# Patient Record
Sex: MALE | Race: WHITE | Employment: OTHER | RURAL
[De-identification: names, ages, dates, MRNs, and addresses within clinical notes are randomized per-mention and may not be internally consistent; named-entity substitution may affect disease eponyms.]

---

## 2018-07-17 ENCOUNTER — OFFICE VISIT (OUTPATIENT)
Dept: FAMILY MEDICINE CLINIC | Age: 73
End: 2018-07-17

## 2018-07-17 VITALS
OXYGEN SATURATION: 98 % | RESPIRATION RATE: 16 BRPM | BODY MASS INDEX: 30.48 KG/M2 | DIASTOLIC BLOOD PRESSURE: 78 MMHG | SYSTOLIC BLOOD PRESSURE: 168 MMHG | HEART RATE: 51 BPM | HEIGHT: 72 IN | TEMPERATURE: 96.4 F | WEIGHT: 225 LBS

## 2018-07-17 DIAGNOSIS — Z13.31 SCREENING FOR DEPRESSION: ICD-10-CM

## 2018-07-17 DIAGNOSIS — M54.50 ACUTE LOW BACK PAIN WITHOUT SCIATICA, UNSPECIFIED BACK PAIN LATERALITY: ICD-10-CM

## 2018-07-17 DIAGNOSIS — Z12.11 COLON CANCER SCREENING: ICD-10-CM

## 2018-07-17 DIAGNOSIS — E11.9 TYPE 2 DIABETES MELLITUS WITHOUT COMPLICATION, WITHOUT LONG-TERM CURRENT USE OF INSULIN (HCC): Primary | ICD-10-CM

## 2018-07-17 DIAGNOSIS — I10 ESSENTIAL HYPERTENSION: ICD-10-CM

## 2018-07-17 DIAGNOSIS — E66.9 OBESITY (BMI 30.0-34.9): ICD-10-CM

## 2018-07-17 DIAGNOSIS — I25.10 CORONARY ARTERY DISEASE INVOLVING NATIVE HEART WITHOUT ANGINA PECTORIS, UNSPECIFIED VESSEL OR LESION TYPE: ICD-10-CM

## 2018-07-17 DIAGNOSIS — Z11.59 NEED FOR HEPATITIS C SCREENING TEST: ICD-10-CM

## 2018-07-17 LAB
GLUCOSE POC: 248 MG/DL
HBA1C MFR BLD HPLC: 10 %

## 2018-07-17 RX ORDER — LOSARTAN POTASSIUM 100 MG/1
100 TABLET ORAL DAILY
Qty: 90 TAB | Refills: 0 | Status: SHIPPED | OUTPATIENT
Start: 2018-07-17 | End: 2018-08-30 | Stop reason: SDUPTHER

## 2018-07-17 RX ORDER — NITROGLYCERIN 0.4 MG/1
0.4 TABLET SUBLINGUAL
COMMUNITY
Start: 2012-02-24 | End: 2018-07-17 | Stop reason: SDUPTHER

## 2018-07-17 RX ORDER — CYCLOBENZAPRINE HCL 10 MG
10 TABLET ORAL
COMMUNITY
Start: 2017-07-19 | End: 2018-07-17

## 2018-07-17 RX ORDER — SIMVASTATIN 40 MG/1
40 TABLET, FILM COATED ORAL
COMMUNITY
Start: 2012-11-24 | End: 2018-07-17

## 2018-07-17 RX ORDER — AMLODIPINE BESYLATE 5 MG/1
5 TABLET ORAL DAILY
Qty: 30 TAB | Refills: 0 | Status: SHIPPED | OUTPATIENT
Start: 2018-07-17 | End: 2018-08-30 | Stop reason: SDUPTHER

## 2018-07-17 RX ORDER — METOPROLOL SUCCINATE 25 MG/1
1 TABLET, EXTENDED RELEASE ORAL
COMMUNITY
Start: 2012-07-30 | End: 2018-07-17 | Stop reason: SDUPTHER

## 2018-07-17 RX ORDER — INSULIN PUMP SYRINGE, 3 ML
EACH MISCELLANEOUS
Qty: 1 KIT | Refills: 0 | Status: SHIPPED | OUTPATIENT
Start: 2018-07-17

## 2018-07-17 RX ORDER — NITROGLYCERIN 0.4 MG/1
0.4 TABLET SUBLINGUAL
Qty: 1 BOTTLE | Refills: 3 | Status: SHIPPED | OUTPATIENT
Start: 2018-07-17

## 2018-07-17 RX ORDER — CLOPIDOGREL BISULFATE 75 MG/1
75 TABLET ORAL DAILY
Qty: 90 TAB | Refills: 3 | Status: SHIPPED | OUTPATIENT
Start: 2018-07-17 | End: 2019-05-23 | Stop reason: SDUPTHER

## 2018-07-17 RX ORDER — LANCETS
EACH MISCELLANEOUS
Qty: 100 EACH | Refills: 3 | Status: SHIPPED | OUTPATIENT
Start: 2018-07-17 | End: 2019-05-23 | Stop reason: SDUPTHER

## 2018-07-17 RX ORDER — LOSARTAN POTASSIUM 100 MG/1
100 TABLET ORAL DAILY
COMMUNITY
End: 2018-07-17 | Stop reason: SDUPTHER

## 2018-07-17 RX ORDER — CYCLOBENZAPRINE HCL 5 MG
5 TABLET ORAL
Qty: 30 TAB | Refills: 0 | Status: SHIPPED | OUTPATIENT
Start: 2018-07-17 | End: 2018-08-30 | Stop reason: SDUPTHER

## 2018-07-17 RX ORDER — IRBESARTAN 150 MG/1
1 TABLET ORAL
COMMUNITY
Start: 2011-05-09 | End: 2018-07-17

## 2018-07-17 RX ORDER — GLIPIZIDE 5 MG/1
1 TABLET ORAL
COMMUNITY
Start: 2011-05-09 | End: 2018-07-17

## 2018-07-17 RX ORDER — CLOPIDOGREL BISULFATE 75 MG/1
TABLET ORAL
COMMUNITY
End: 2018-07-17 | Stop reason: SDUPTHER

## 2018-07-17 RX ORDER — METFORMIN HYDROCHLORIDE 500 MG/1
1000 TABLET ORAL 2 TIMES DAILY WITH MEALS
Qty: 180 TAB | Refills: 0 | Status: SHIPPED | OUTPATIENT
Start: 2018-07-17 | End: 2018-08-23 | Stop reason: SDUPTHER

## 2018-07-17 RX ORDER — METFORMIN HYDROCHLORIDE 500 MG/1
1000 TABLET ORAL 2 TIMES DAILY WITH MEALS
COMMUNITY
Start: 2011-05-09 | End: 2018-07-17 | Stop reason: SDUPTHER

## 2018-07-17 RX ORDER — ASPIRIN 81 MG/1
81 TABLET ORAL
COMMUNITY
Start: 2011-05-09

## 2018-07-17 RX ORDER — METOPROLOL SUCCINATE 25 MG/1
25 TABLET, EXTENDED RELEASE ORAL DAILY
Qty: 90 TAB | Refills: 1 | Status: SHIPPED | OUTPATIENT
Start: 2018-07-17 | End: 2019-05-23 | Stop reason: SDUPTHER

## 2018-07-17 NOTE — MR AVS SNAPSHOT
[facility] 6 
[phone] Patient: Milena Christian MRN: [id number] TYX:[id number] Visit Information Date & Time Provider Department Dept. Phone Encounter #  
 7/17/2018  4:40 PM Yvonne Acosta MD [address] [id number] Follow-up Instructions Return in about 3 months (around 10/17/2018). Your Appointments 7/31/2018  2:20 PM  
Any with Yvonne Acosta MD  
[facility] ([facility]) Appt Note: 2 wk f/u $10 CP mbm  
 [phone]  
[phone]  
  
   
 [address] [address] Upcoming Health Maintenance Date Due Hepatitis C Screening 1945 DTaP/Tdap/Td series (1 - Tdap) 2/11/1968 FOBT Q 1 YEAR AGE 50-75 2/11/1997 ZOSTER VACCINE AGE 60> 12/11/2006 GLAUCOMA SCREENING Q2Y 2/11/2012 Pneumococcal 65+ Low/Medium Risk (1 of 2 - PCV13) 2/11/2012 Influenza Age 5 to Adult 8/1/2018 Allergies as of 7/17/2018  Review Complete On: 7/17/2018 By: Marie Umana LPN Severity Noted Reaction Type Reactions Other Plant, Animal, Environmental High 07/19/2017    Anaphylaxis Bee sting, swelling, throat swells, can't breathe Zocor [Simvastatin]  07/17/2018    Myalgia Current Immunizations  Never Reviewed No immunizations on file. Not reviewed this visit You Were Diagnosed With   
  
 Codes Comments Type 2 diabetes mellitus without complication, without long-term current use of insulin (HCC)    -  Primary ICD-10-CM: E11.9 ICD-9-CM: 250.00 Essential hypertension     ICD-10-CM: I10 
ICD-9-CM: 401.9 Screening for depression     ICD-10-CM: Z13.89 ICD-9-CM: V79.0 Acute low back pain without sciatica, unspecified back pain laterality     ICD-10-CM: M54.5 ICD-9-CM: 724.2 Colon cancer screening     ICD-10-CM: Z12.11 ICD-9-CM: V76.51 Need for hepatitis C screening test     ICD-10-CM: Z11.59 
ICD-9-CM: V73.89 Obesity (BMI 30.0-34.9)     ICD-10-CM: B08.3 ICD-9-CM: 278.00 Coronary artery disease involving native heart without angina pectoris, unspecified vessel or lesion type     ICD-10-CM: I25.10 ICD-9-CM: 414.01 Vitals BP Pulse Temp Resp Height(growth percentile) Weight(growth percentile) 168/78 (BP 1 Location: Left arm, BP Patient Position: Sitting) (!) 51 96.4 °F (35.8 °C) (Oral) 16 6' (1.829 m) 225 lb (102.1 kg) SpO2 BMI Smoking Status 98% 30.52 kg/m2 Former Smoker BMI and BSA Data Body Mass Index Body Surface Area 30.52 kg/m 2 2.28 m 2 Preferred Pharmacy Pharmacy Name Phone [facility] [phone] Your Updated Medication List  
  
   
This list is accurate as of 7/17/18  5:33 PM.  Always use your most recent med list. amLODIPine 5 mg tablet Commonly known as:  Christiane Colon Take 1 Tab by mouth daily. aspirin delayed-release 81 mg tablet 81 mg. Blood-Glucose Meter monitoring kit Check Glucose once a day in am fasting and occ 2 h after a meal for DM E11.9  
  
 clopidogrel 75 mg Tab Commonly known as:  PLAVIX Take 1 Tab by mouth daily. cyclobenzaprine 5 mg tablet Commonly known as:  FLEXERIL Take 1 Tab by mouth three (3) times daily as needed for Muscle Spasm(s). Lancets Misc Check Glucose once a day for DM E11.9  
  
 losartan 100 mg tablet Commonly known as:  COZAAR Take 1 Tab by mouth daily. metFORMIN 500 mg tablet Commonly known as:  GLUCOPHAGE Take 2 Tabs by mouth two (2) times daily (with meals). metoprolol succinate 25 mg XL tablet Commonly known as:  TOPROL-XL Take 1 Tab by mouth daily. nitroglycerin 0.4 mg SL tablet Commonly known as:  NITROSTAT 1 Tab by SubLINGual route every five (5) minutes as needed for Chest Pain. Indications: pt doesn't have any Prescriptions Sent to Pharmacy Refills  
 cyclobenzaprine (FLEXERIL) 5 mg tablet 0 Sig: Take 1 Tab by mouth three (3) times daily as needed for Muscle Spasm(s). Class: Normal  
 Pharmacy: [facility] DR JENNIFER CRISTOBALCONRADO [facility], [facility] Neil [place]oon Ph #: [phone] Route: Oral  
 metFORMIN (GLUCOPHAGE) 500 mg tablet 0 Sig: Take 2 Tabs by mouth two (2) times daily (with meals). Class: Normal  
 Pharmacy: [facility] DR JENNIFER ADAMS [facility], [facility] Neil Juanjomolly Ph #: [phone] Route: Oral  
 metoprolol succinate (TOPROL-XL) 25 mg XL tablet 1 Sig: Take 1 Tab by mouth daily. Class: Normal  
 Pharmacy: [facility] DR JENNIFER ADAMS [facility], [facility] Neil Ayah Ph #: [phone] Route: Oral  
 clopidogrel (PLAVIX) 75 mg tab 3 Sig: Take 1 Tab by mouth daily. Class: Normal  
 Pharmacy: [facility] DR JENNIFER ADAMS [facility], [facility] Neil Geigermolly Ph #: [phone] Route: Oral  
 losartan (COZAAR) 100 mg tablet 0 Sig: Take 1 Tab by mouth daily. Class: Normal  
 [facility] Neil Ayah Ph #: [phone] Route: Oral  
 nitroglycerin (NITROSTAT) 0.4 mg SL tablet 3 Si Tab by SubLINGual route every five (5) minutes as needed for Chest Pain. Indications: pt doesn't have any Class: Normal  
 Pharmacy: [facility] DR JENNIFER CRISTOBALCONRADO [facility], [facility] Neil molly Ph #: [phone] Route: SubLINGual  
 Blood-Glucose Meter monitoring kit 0 Sig: Check Glucose once a day in am fasting and occ 2 h after a meal for DM E11.9 Class: Normal  
 Pharmacy: [facility] [address]ENN[address] [place]oon Ph #: [phone] Lancets misc 3 Sig: Check Glucose once a day for DM E11.9 Class: Normal  
 Pharmacy: [facility] [address] [place] Ph #: [phone] amLODIPine (NORVASC) 5 mg tablet 0 Sig: Take 1 Tab by mouth daily. Class: Normal  
 Pharmacy: [address] [place] Ph #: [phone] Route: Oral  
  
We Performed the Following AMB POC GLUCOSE BLOOD, BY GLUCOSE MONITORING DEVICE [18840 CPT(R)] AMB POC HEMOGLOBIN A1C [01778 CPT(R)] CBC WITH AUTOMATED DIFF [12042 CPT(R)] COLLECTION VENOUS BLOOD,VENIPUNCTURE Z167346 CPT(R)] HEP C AB BY RIBA [91602 CPT(R)]  DIABETES FOOT EXAM [HM7 Custom] LIPID PANEL WITH LDL/HDL RATIO [48047 CPT(R)] METABOLIC PANEL, COMPREHENSIVE [48895 CPT(R)] MICROALBUMIN, UR, RAND W/ MICROALB/CREAT RATIO [id number] CPT(R)] TN HANDLG&/OR CONVEY OF SPEC FOR TR OFFICE TO LAB [46287 CPT(R)] Follow-up Instructions Return in about 3 months (around 10/17/2018). To-Do List   
 08/16/2018 Lab:  OCCULT BLOOD, IMMUNOASSAY (FIT) Introducing [facility]! Jessie Combs introduces [employer] patient portal. Now you can access parts of your medical record, email your doctor's office, and request medication refills online. 1. In your internet browser, go to [email or web address] 2. Click on the First Time User? Click Here link in the Sign In box. You will see the New Member Sign Up page. 3. Enter your [employer] Access Code exactly as it appears below. You will not need to use this code after youve completed the sign-up process. If you do not sign up before the expiration date, you must request a new code. · [employer] Access Code: [email or web address] Expires: 10/15/2018  5:31 PM 
 
4. Enter the last four digits of your Social Security Number (xxxx) and Date of Birth (mm/dd/yyyy) as indicated and click Submit. You will be taken to the next sign-up page. 5. Create a [employer] ID. This will be your [employer] login ID and cannot be changed, so think of one that is secure and easy to remember. 6. Create a [employer] password. You can change your password at any time. 7. Enter your Password Reset Question and Answer. This can be used at a later time if you forget your password. 8. Enter your e-mail address. You will receive e-mail notification when new information is available in [address] 9. Click Sign Up. You can now view and download portions of your medical record. 10. Click the Download Summary menu link to download a portable copy of your medical information. If you have questions, please visit the Frequently Asked Questions section of the [employer] website. Remember, [employer] is NOT to be used for urgent needs. For medical emergencies, dial 911. Now available from your iPhone and Android! Please provide this summary of care documentation to your next provider. If you have any questions after today's visit, please call [phone].

## 2018-07-17 NOTE — PROGRESS NOTES
HISTORY OF PRESENT ILLNESS  Sarah Barr is a 72 y.o. male. Chief Complaint   Patient presents with    Establish Care     est. PCP       HPI  DM  No BS checks  Taking Metformin 1000 bid  Ran out a few days ago  Last HbA1C checked 6 mo ago and borderline per pt    HTN  BP usually 140/65-70  On Metoprolol and Losartan    Has back pain started about a mo ago  From moving   Coming and going  No radiation  Using heat pad  Has disc problems and aggravated it  Taking Aleve 2 a day    Obesity   Has lost a lot of weight  Used to walk 5 miles a day, but less since back pain and moving    Prev was on Zocor and had SE    Review of Systems   Constitutional: Negative for weight loss. Respiratory: Negative for cough and shortness of breath. Cardiovascular: Negative for chest pain. Gastrointestinal: Negative. Genitourinary: Negative. Musculoskeletal: Positive for back pain. Negative for falls. Neurological: Positive for focal weakness (right leg). Negative for tingling and sensory change. Past Medical History:   Diagnosis Date    Diabetes ([address]     Hernia of abdominal wall 2012    Hypercholesterolemia     Hypertension     MI, old 2011       Past Surgical History:   Procedure Laterality Date    HX CORONARY STENT PLACEMENT  2011    HX HERNIA REPAIR  2012       Current Outpatient Prescriptions   Medication Sig Dispense Refill    aspirin delayed-release 81 mg tablet 81 mg.      cyclobenzaprine (FLEXERIL) 5 mg tablet Take 1 Tab by mouth three (3) times daily as needed for Muscle Spasm(s). 30 Tab 0    metFORMIN (GLUCOPHAGE) 500 mg tablet Take 2 Tabs by mouth two (2) times daily (with meals). 180 Tab 0    metoprolol succinate (TOPROL-XL) 25 mg XL tablet Take 1 Tab by mouth daily. 90 Tab 1    clopidogrel (PLAVIX) 75 mg tab Take 1 Tab by mouth daily. 90 Tab 3    losartan (COZAAR) 100 mg tablet Take 1 Tab by mouth daily.  90 Tab 0    nitroglycerin (NITROSTAT) 0.4 mg SL tablet 1 Tab by SubLINGual route every five (5) minutes as needed for Chest Pain. Indications: pt doesn't have any 1 Bottle 3    Blood-Glucose Meter monitoring kit Check Glucose once a day in am fasting and occ 2 h after a meal for DM E11.9 1 Kit 0    Lancets misc Check Glucose once a day for DM E11.9 100 Each 3    amLODIPine (NORVASC) 5 mg tablet Take 1 Tab by mouth daily. 30 Tab 0       Allergies   Allergen Reactions    Other Plant, Animal, Environmental Anaphylaxis     Bee sting, swelling, throat swells, can't breathe    Zocor [Simvastatin] Myalgia       Visit Vitals    /78 (BP 1 Location: Left arm, BP Patient Position: Sitting)    Pulse (!) 51    Temp 96.4 °F (35.8 °C) (Oral)    Resp 16    Ht 6' (1.829 m)    Wt 225 lb (102.1 kg)    SpO2 98%    BMI 30.52 kg/m2     Physical Exam   Constitutional: He is oriented to person, place, and time. He appears well-developed and well-nourished. No distress. HENT:   Head: Normocephalic and atraumatic. Right Ear: External ear normal.   Left Ear: External ear normal.   Mouth/Throat: Oropharynx is clear and moist. No oropharyngeal exudate. Eyes: Conjunctivae and EOM are normal. Pupils are equal, round, and reactive to light. Cardiovascular: Normal rate, regular rhythm, normal heart sounds and intact distal pulses. Pulmonary/Chest: Effort normal and breath sounds normal.   Abdominal: Soft. He exhibits no distension. There is no tenderness. Musculoskeletal: He exhibits no edema. Lymphadenopathy:     He has no cervical adenopathy. Neurological: He is alert and oriented to person, place, and time. Skin: Skin is warm and dry. Psychiatric: He has a normal mood and affect. Nursing note and vitals reviewed. Recent Results (from the past 12 hour(s))   AMB POC HEMOGLOBIN A1C    Collection Time: 07/17/18  5:23 PM   Result Value Ref Range    Hemoglobin A1c (POC) 10.0 %       ASSESSMENT and PLAN    ICD-10-CM ICD-9-CM    1.  Type 2 diabetes mellitus without complication, without long-term current use of insulin (HCC) E11.9 250.00 CBC WITH AUTOMATED DIFF      AMB POC HEMOGLOBIN A1C      HM DIABETES FOOT EXAM      MICROALBUMIN, UR, RAND W/ MICROALB/CREAT RATIO      MT HANDLG&/OR CONVEY OF SPEC FOR TR OFFICE TO LAB      COLLECTION VENOUS BLOOD,VENIPUNCTURE      metFORMIN (GLUCOPHAGE) 500 mg tablet      AMB POC GLUCOSE BLOOD, BY GLUCOSE MONITORING DEVICE      Blood-Glucose Meter monitoring kit      Lancets misc   2. Essential hypertension I10 401.9 LIPID PANEL WITH LDL/HDL RATIO      METABOLIC PANEL, COMPREHENSIVE      metoprolol succinate (TOPROL-XL) 25 mg XL tablet      losartan (COZAAR) 100 mg tablet      amLODIPine (NORVASC) 5 mg tablet   3. Screening for depression Z13.89 V79.0    4. Acute low back pain without sciatica, unspecified back pain laterality M54.5 724.2 cyclobenzaprine (FLEXERIL) 5 mg tablet   5. Colon cancer screening Z12.11 V76.51 OCCULT BLOOD, IMMUNOASSAY (FIT)      CANCELED: OCCULT BLOOD, IMMUNOASSAY (FIT)   6. Need for hepatitis C screening test Z11.59 V73.89 HEP C AB BY RIBA   7. Coronary artery disease involving native heart without angina pectoris, unspecified vessel or lesion type I25.10 414.01 clopidogrel (PLAVIX) 75 mg tab      nitroglycerin (NITROSTAT) 0.4 mg SL tablet   8. Obesity (BMI 30.0-34. 9) E66.9 278.00    Diet, exercise and weight loss discussed.   Recheck in 2-3 w with diary for further adjustment of DM meds  And recheck of BP

## 2018-07-17 NOTE — PROGRESS NOTES
Chief Complaint   Patient presents with   [address]     est. PCP     Health Maintenance Due   Topic Date Due    Hepatitis C Screening  1945    DTaP/Tdap/Td series (1 - Tdap) 02/11/1968    FOBT Q 1 YEAR AGE 50-75  02/11/1997    ZOSTER VACCINE AGE 60>  12/11/2006    GLAUCOMA SCREENING Q2Y  02/11/2012    Pneumococcal 65+ Low/Medium Risk (1 of 2 - PCV13) 02/11/2012     Visit Vitals    /78 (BP 1 Location: Left arm, BP Patient Position: Sitting)    Pulse (!) 51    Temp 96.4 °F (35.8 °C) (Oral)    Resp 16    Ht 6' (1.829 m)    Wt 225 lb (102.1 kg)    SpO2 98%    BMI 30.52 kg/m2     1. Have you been to the ER, urgent care clinic since your last visit? Hospitalized since your last visit? No    2. Have you seen or consulted any other health care providers outside of the [address] [place] [address] since your last visit? Include any pap smears or colon screening.  No    Jamir Galvan LPN

## 2018-07-18 PROBLEM — I48.91 ATRIAL FIBRILLATION (HCC): Status: ACTIVE | Noted: 2018-07-18

## 2018-07-20 LAB
ALBUMIN SERPL-MCNC: 4.3 G/DL (ref 3.5–4.8)
ALBUMIN/CREAT UR: 7.4 MG/G CREAT (ref 0–30)
ALBUMIN/GLOB SERPL: 1.7 {RATIO} (ref 1.2–2.2)
ALP SERPL-CCNC: 84 IU/L (ref 39–117)
ALT SERPL-CCNC: 19 IU/L (ref 0–44)
AST SERPL-CCNC: 17 IU/L (ref 0–40)
BASOPHILS # BLD AUTO: 0.1 X10E3/UL (ref 0–0.2)
BASOPHILS NFR BLD AUTO: 2 %
BILIRUB SERPL-MCNC: 0.8 MG/DL (ref 0–1.2)
BUN SERPL-MCNC: 21 MG/DL (ref 8–27)
BUN/CREAT SERPL: 13 (ref 10–24)
CALCIUM SERPL-MCNC: 10.1 MG/DL (ref 8.6–10.2)
CHLORIDE SERPL-SCNC: 100 MMOL/L (ref 96–106)
CHOLEST SERPL-MCNC: 241 MG/DL (ref 100–199)
CO2 SERPL-SCNC: 22 MMOL/L (ref 20–29)
COMMENT, 144067: NORMAL
CREAT SERPL-MCNC: 1.59 MG/DL (ref 0.76–1.27)
CREAT UR-MCNC: 117.5 MG/DL
EOSINOPHIL # BLD AUTO: 0.4 X10E3/UL (ref 0–0.4)
EOSINOPHIL NFR BLD AUTO: 7 %
ERYTHROCYTE [DISTWIDTH] IN BLOOD BY AUTOMATED COUNT: 14.5 % (ref 12.3–15.4)
GLOBULIN SER CALC-MCNC: 2.5 G/DL (ref 1.5–4.5)
GLUCOSE SERPL-MCNC: 244 MG/DL (ref 65–99)
HCT VFR BLD AUTO: 47.8 % (ref 37.5–51)
HCV AB S/CO SERPL IA: <0.1 S/CO RATIO (ref 0–0.9)
HDLC SERPL-MCNC: 38 MG/DL
HGB BLD-MCNC: 16.3 G/DL (ref 13–17.7)
IMM GRANULOCYTES # BLD: 0 X10E3/UL (ref 0–0.1)
IMM GRANULOCYTES NFR BLD: 0 %
INTERPRETATION, 910389: NORMAL
INTERPRETATION: NORMAL
LDLC SERPL CALC-MCNC: 133 MG/DL (ref 0–99)
LDLC/HDLC SERPL: 3.5 RATIO (ref 0–3.6)
LYMPHOCYTES # BLD AUTO: 2.9 X10E3/UL (ref 0.7–3.1)
LYMPHOCYTES NFR BLD AUTO: 45 %
MCH RBC QN AUTO: 29.8 PG (ref 26.6–33)
MCHC RBC AUTO-ENTMCNC: 34.1 G/DL (ref 31.5–35.7)
MCV RBC AUTO: 87 FL (ref 79–97)
MICROALBUMIN UR-MCNC: 8.7 UG/ML
MONOCYTES # BLD AUTO: 0.8 X10E3/UL (ref 0.1–0.9)
MONOCYTES NFR BLD AUTO: 13 %
NEUTROPHILS # BLD AUTO: 2.1 X10E3/UL (ref 1.4–7)
NEUTROPHILS NFR BLD AUTO: 33 %
PDF IMAGE, 910387: NORMAL
PLATELET # BLD AUTO: 205 X10E3/UL (ref 150–379)
POTASSIUM SERPL-SCNC: 4.8 MMOL/L (ref 3.5–5.2)
PROT SERPL-MCNC: 6.8 G/DL (ref 6–8.5)
RBC # BLD AUTO: 5.47 X10E6/UL (ref 4.14–5.8)
SODIUM SERPL-SCNC: 140 MMOL/L (ref 134–144)
TRIGL SERPL-MCNC: 351 MG/DL (ref 0–149)
VLDLC SERPL CALC-MCNC: 70 MG/DL (ref 5–40)
WBC # BLD AUTO: 6.4 X10E3/UL (ref 3.4–10.8)

## 2018-07-20 NOTE — PROGRESS NOTES
Call pt, the CBC is normal  The urine test is normal  The Chol is high, especially the Triglycerides are over 350, RTC in 2 w to discuss treatment  The kidney tests are a little higher  The liver tests and electrolytes are normal  The Hep C is negative

## 2018-07-31 ENCOUNTER — OFFICE VISIT (OUTPATIENT)
Dept: FAMILY MEDICINE CLINIC | Age: 73
End: 2018-07-31

## 2018-07-31 VITALS
WEIGHT: 218.4 LBS | HEART RATE: 71 BPM | RESPIRATION RATE: 14 BRPM | OXYGEN SATURATION: 98 % | TEMPERATURE: 96.7 F | SYSTOLIC BLOOD PRESSURE: 137 MMHG | BODY MASS INDEX: 29.58 KG/M2 | HEIGHT: 72 IN | DIASTOLIC BLOOD PRESSURE: 69 MMHG

## 2018-07-31 DIAGNOSIS — E66.9 OBESITY (BMI 30.0-34.9): ICD-10-CM

## 2018-07-31 DIAGNOSIS — E78.2 MIXED HYPERLIPIDEMIA: ICD-10-CM

## 2018-07-31 DIAGNOSIS — L57.0 AK (ACTINIC KERATOSIS): ICD-10-CM

## 2018-07-31 DIAGNOSIS — E11.21 TYPE 2 DIABETES WITH NEPHROPATHY (HCC): Primary | ICD-10-CM

## 2018-07-31 DIAGNOSIS — I10 ESSENTIAL HYPERTENSION: ICD-10-CM

## 2018-07-31 LAB — GLUCOSE POC: 171 MG/DL

## 2018-07-31 NOTE — PROGRESS NOTES
HISTORY OF PRESENT ILLNESS Tyree Davis is a 72 y.o. male. Chief Complaint Patient presents with  Cholesterol Problem 2 wk f/u HPI 
DM No BS checks Has not gotten the meter Taking Metformin 500 mg bid only not 1000 bid HTN 
BP usually 138/70 On 3 BP meds Hyperlipidemia Not on any meds Had SE with Lipitor Obesity Has been watching diet 1500 Ernesto a day Has lost 7 lbs since last visit Was 280 at some point Going up and down stairs now No other formal exercises Back feeling better Still avoiding heavy lifting Had 2 places on face frozen previously One on left face still present and raw feeling And another skin lesion at nose ROS Past Medical History:  
Diagnosis Date  Diabetes ([address]  Hernia of abdominal wall 2012  Hypercholesterolemia  Hypertension  MI, old 2011 Past Surgical History:  
Procedure Laterality Date  HX CORONARY STENT PLACEMENT  2011  HX HERNIA REPAIR  2012 Current Outpatient Prescriptions Medication Sig Dispense Refill  aspirin delayed-release 81 mg tablet 81 mg.    
 cyclobenzaprine (FLEXERIL) 5 mg tablet Take 1 Tab by mouth three (3) times daily as needed for Muscle Spasm(s). 30 Tab 0  
 metFORMIN (GLUCOPHAGE) 500 mg tablet Take 2 Tabs by mouth two (2) times daily (with meals). 180 Tab 0  
 metoprolol succinate (TOPROL-XL) 25 mg XL tablet Take 1 Tab by mouth daily. 90 Tab 1  clopidogrel (PLAVIX) 75 mg tab Take 1 Tab by mouth daily. 90 Tab 3  
 losartan (COZAAR) 100 mg tablet Take 1 Tab by mouth daily. 90 Tab 0  
 nitroglycerin (NITROSTAT) 0.4 mg SL tablet 1 Tab by SubLINGual route every five (5) minutes as needed for Chest Pain. Indications: pt doesn't have any 1 Bottle 3  Blood-Glucose Meter monitoring kit Check Glucose once a day in am fasting and occ 2 h after a meal for DM E11.9 1 Kit 0  
 Lancets misc Check Glucose once a day for DM E11.9 100 Each 3  
 amLODIPine (NORVASC) 5 mg tablet Take 1 Tab by mouth daily. 30 Tab 0 Allergies Allergen Reactions  Other Plant, Animal, Environmental Anaphylaxis Bee sting, swelling, throat swells, can't breathe  Atorvastatin Calcium Myalgia  Crestor [Rosuvastatin] Myalgia  Zocor [Simvastatin] Myalgia Visit Vitals  /69 (BP 1 Location: Right arm, BP Patient Position: Sitting)  Pulse 71  Temp 96.7 °F (35.9 °C) (Oral)  Resp 14  
 Ht 6' (1.829 m)  Wt 218 lb 6.4 oz (99.1 kg)  SpO2 98%  BMI 29.62 kg/m2 Physical Exam  
Constitutional: He is oriented to person, place, and time. He appears well-developed and well-nourished. No distress. HENT:  
Head: Normocephalic and atraumatic. Eyes: Conjunctivae and EOM are normal.  
Pulmonary/Chest: Effort normal.  
Neurological: He is alert and oriented to person, place, and time. Skin:  
Left preauricular area with 8 mm red rough skin lesion sim to AK, at nose 2 mm raised skin lesion Psychiatric: He has a normal mood and affect. Nursing note and vitals reviewed. Recent Results (from the past 12 hour(s)) AMB POC GLUCOSE BLOOD, BY GLUCOSE MONITORING DEVICE Collection Time: 07/31/18  3:06 PM  
Result Value Ref Range Glucose  mg/dL Liquid Nitrogen treatment of 2 skin lesions x 3 ea, pt tolerating it well ASSESSMENT and PLAN 
  ICD-10-CM ICD-9-CM 1. Type 2 diabetes with nephropathy (HCC) E11.21 250.40 AMB POC GLUCOSE BLOOD, BY GLUCOSE MONITORING DEVICE Increase Metformin to 2 tablets at night, cont one in am and monitor BS  
  583.81   
2. Essential hypertension I10 401.9 3. Mixed hyperlipidemia E78.2 272.2 Trial of Niacin  mg a day And recheck in 3 mo 4. Obesity (BMI 30.0-34. 9) E66.9 278.00 Cont diet and exercise and weight loss 5. AK (actinic keratosis) L57.0 702.0 Destruction of 2 skin lesions with liquid Nitrogen Recheck in 1 mo

## 2018-07-31 NOTE — PROGRESS NOTES
Chief Complaint Patient presents with  Cholesterol Problem 2 wk f/u Health Maintenance Due Topic Date Due  
 EYE EXAM RETINAL OR DILATED Q1  02/11/1955  
 DTaP/Tdap/Td series (1 - Tdap) 02/11/1968  FOBT Q 1 YEAR AGE 50-75  02/11/1997  ZOSTER VACCINE AGE 60>  12/11/2006  GLAUCOMA SCREENING Q2Y  02/11/2012  Pneumococcal 65+ Low/Medium Risk (1 of 2 - PCV13) 02/11/2012 Visit Vitals  /73 (BP 1 Location: Left arm, BP Patient Position: Sitting)  Pulse 72  Temp 96.7 °F (35.9 °C) (Oral)  Resp 14  
 Ht 6' (1.829 m)  Wt 218 lb 6.4 oz (99.1 kg)  SpO2 98%  BMI 29.62 kg/m2 1. Have you been to the ER, urgent care clinic since your last visit? Hospitalized since your last visit? No 
 
2. Have you seen or consulted any other health care providers outside of the [address] [place] [address] since your last visit? Include any pap smears or colon screening.  No 
 
Kaylyn Kellogg LPN

## 2018-07-31 NOTE — MR AVS SNAPSHOT
[place] [address] 
[phone] Patient: Steffany Harris MRN: [id number]:[id number] Visit Information Date & Time Provider Department Dept. Phone Encounter #  
 7/31/2018  2:20 PM Jessica Franco  [facility] [id number] Upcoming Health Maintenance Date Due  
 EYE EXAM RETINAL OR DILATED Q1 2/11/1957 DTaP/Tdap/Td series (1 - Tdap) 2/11/1968 FOBT Q 1 YEAR AGE 50-75 2/11/1997 ZOSTER VACCINE AGE 60> 12/11/2006 GLAUCOMA SCREENING Q2Y 2/11/2012 Pneumococcal 65+ Low/Medium Risk (1 of 2 - PCV13) 2/11/2012 Influenza Age 5 to Adult 8/1/2018 HEMOGLOBIN A1C Q6M 1/17/2019 FOOT EXAM Q1 7/17/2019 MICROALBUMIN Q1 7/17/2019 LIPID PANEL Q1 7/17/2019 Allergies as of 7/31/2018  Review Complete On: 7/31/2018 By: Arelis Cuadra LPN Severity Noted Reaction Type Reactions Other Plant, Animal, Environmental High 07/19/2017    Anaphylaxis Bee sting, swelling, throat swells, can't breathe Atorvastatin Calcium  01/01/1900    Myalgia Crestor [Rosuvastatin]  07/18/2018    Myalgia Zocor [Simvastatin]  07/17/2018    Myalgia Current Immunizations  Never Reviewed No immunizations on file. Not reviewed this visit You Were Diagnosed With   
  
 Codes Comments Type 2 diabetes with nephropathy (HCC)    -  Primary ICD-10-CM: E11.21 
ICD-9-CM: 250.40, 583.81 Essential hypertension     ICD-10-CM: I10 
ICD-9-CM: 401.9 Mixed hyperlipidemia     ICD-10-CM: E78.2 ICD-9-CM: 272.2 Obesity (BMI 30.0-34.9)     ICD-10-CM: J65.3 ICD-9-CM: 278.00 AK (actinic keratosis)     ICD-10-CM: L57.0 ICD-9-CM: 702.0 Vitals BP Pulse Temp Resp Height(growth percentile) Weight(growth percentile)  
 137/69 (BP 1 Location: Right arm, BP Patient Position: Sitting) 71 96.7 °F (35.9 °C) (Oral) 14 6' (1.829 m) 218 lb 6.4 oz (99.1 kg) SpO2 BMI Smoking Status 98% 29.62 kg/m2 Former Smoker Vitals History BMI and BSA Data Body Mass Index Body Surface Area  
 29.62 kg/m 2 2.24 m 2 Preferred Pharmacy Pharmacy Name Phone [facility] Lauren Bee [phone] Your Updated Medication List  
  
   
This list is accurate as of 7/31/18  3:31 PM.  Always use your most recent med list. amLODIPine 5 mg tablet Commonly known as:  Lemus Cater Take 1 Tab by mouth daily. aspirin delayed-release 81 mg tablet 81 mg. Blood-Glucose Meter monitoring kit Check Glucose once a day in am fasting and occ 2 h after a meal for DM E11.9  
  
 clopidogrel 75 mg Tab Commonly known as:  PLAVIX Take 1 Tab by mouth daily. cyclobenzaprine 5 mg tablet Commonly known as:  FLEXERIL Take 1 Tab by mouth three (3) times daily as needed for Muscle Spasm(s). Lancets Misc Check Glucose once a day for DM E11.9  
  
 losartan 100 mg tablet Commonly known as:  COZAAR Take 1 Tab by mouth daily. metFORMIN 500 mg tablet Commonly known as:  GLUCOPHAGE Take 2 Tabs by mouth two (2) times daily (with meals). metoprolol succinate 25 mg XL tablet Commonly known as:  TOPROL-XL Take 1 Tab by mouth daily. nitroglycerin 0.4 mg SL tablet Commonly known as:  NITROSTAT  
1 Tab by SubLINGual route every five (5) minutes as needed for Chest Pain. Indications: pt doesn't have any We Performed the Following AMB POC GLUCOSE BLOOD, BY GLUCOSE MONITORING DEVICE [77776 CPT(R)] Introducing [facility]! [facility] introduces [employer] patient portal. Now you can access parts of your medical record, email your doctor's office, and request medication refills online. 1. In your internet browser, go to [email or web address] [email or web address] 2. Click on the First Time User? Click Here link in the Sign In box.  You will see the New Member Sign Up page. 3. Enter your [employer] Access Code exactly as it appears below. You will not need to use this code after youve completed the sign-up process. If you do not sign up before the expiration date, you must request a new code. · [employer] Access Code: [email or web address] Expires: 10/15/2018  5:31 PM 
 
4. Enter the last four digits of your Social Security Number (xxxx) and Date of Birth (mm/dd/yyyy) as indicated and click Submit. You will be taken to the next sign-up page. 5. Create a [employer] ID. This will be your [employer] login ID and cannot be changed, so think of one that is secure and easy to remember. 6. Create a [employer] password. You can change your password at any time. 7. Enter your Password Reset Question and Answer. This can be used at a later time if you forget your password. 8. Enter your e-mail address. You will receive e-mail notification when new information is available in [address]. 9. Click Sign Up. You can now view and download portions of your medical record. 10. Click the Download Summary menu link to download a portable copy of your medical information. If you have questions, please visit the Frequently Asked Questions section of the [employer] website. Remember, [employer] is NOT to be used for urgent needs. For medical emergencies, dial 911. Now available from your iPhone and Android! Please provide this summary of care documentation to your next provider. If you have any questions after today's visit, please call [phone].

## 2018-08-02 LAB — HEMOCCULT STL QL IA: NEGATIVE

## 2018-08-23 DIAGNOSIS — E11.9 TYPE 2 DIABETES MELLITUS WITHOUT COMPLICATION, WITHOUT LONG-TERM CURRENT USE OF INSULIN (HCC): ICD-10-CM

## 2018-08-23 RX ORDER — METFORMIN HYDROCHLORIDE 500 MG/1
1000 TABLET ORAL 2 TIMES DAILY WITH MEALS
Qty: 360 TAB | Refills: 0 | Status: SHIPPED | OUTPATIENT
Start: 2018-08-23 | End: 2019-02-06 | Stop reason: SDUPTHER

## 2018-08-23 NOTE — TELEPHONE ENCOUNTER
Pharmacy sends fax stating \"pt would like a 90 day supply\"   Requested Prescriptions     Pending Prescriptions Disp Refills    metFORMIN (GLUCOPHAGE) 500 mg tablet 180 Tab 0     Sig: Take 2 Tabs by mouth two (2) times daily (with meals).

## 2018-08-30 ENCOUNTER — OFFICE VISIT (OUTPATIENT)
Dept: FAMILY MEDICINE CLINIC | Age: 73
End: 2018-08-30

## 2018-08-30 VITALS
OXYGEN SATURATION: 96 % | RESPIRATION RATE: 18 BRPM | DIASTOLIC BLOOD PRESSURE: 68 MMHG | SYSTOLIC BLOOD PRESSURE: 125 MMHG | HEIGHT: 72 IN | WEIGHT: 215 LBS | BODY MASS INDEX: 29.12 KG/M2 | HEART RATE: 67 BPM | TEMPERATURE: 97.1 F

## 2018-08-30 DIAGNOSIS — I10 ESSENTIAL HYPERTENSION: ICD-10-CM

## 2018-08-30 DIAGNOSIS — M54.50 ACUTE LOW BACK PAIN WITHOUT SCIATICA, UNSPECIFIED BACK PAIN LATERALITY: ICD-10-CM

## 2018-08-30 DIAGNOSIS — E11.21 TYPE 2 DIABETES WITH NEPHROPATHY (HCC): Primary | ICD-10-CM

## 2018-08-30 LAB — GLUCOSE POC: 140 MG/DL

## 2018-08-30 RX ORDER — CYCLOBENZAPRINE HCL 5 MG
5 TABLET ORAL
Qty: 90 TAB | Refills: 1 | Status: SHIPPED | OUTPATIENT
Start: 2018-08-30 | End: 2020-08-06 | Stop reason: SDUPTHER

## 2018-08-30 RX ORDER — AMLODIPINE BESYLATE 5 MG/1
5 TABLET ORAL DAILY
Qty: 90 TAB | Refills: 1 | Status: SHIPPED | OUTPATIENT
Start: 2018-08-30 | End: 2019-05-23 | Stop reason: SDUPTHER

## 2018-08-30 RX ORDER — LOSARTAN POTASSIUM 100 MG/1
100 TABLET ORAL DAILY
Qty: 90 TAB | Refills: 1 | Status: SHIPPED | OUTPATIENT
Start: 2018-08-30 | End: 2019-05-23 | Stop reason: SDUPTHER

## 2018-08-30 NOTE — PROGRESS NOTES
HISTORY OF PRESENT ILLNESS  Sina Hirsch is a 72 y.o. male. Chief Complaint   Patient presents with    Follow-up     glucose re-check, needs refills amlodipine and flexeril (leaving soon for 6 mos)       HPI  HTN  Was on Amlodipine 5 for 30 d  No SE with it  No BP checks  Ran out 2 d ago    DM  No BS checks  On Metformin  Taking Metformin 1 in am and 2 at night  Gets upset stomach when taking 2 in am  Last HbA1C 10.0 7/17/18  Had been out of meds for a while  Has not picked up Glucometer    Taking only Flexeril for back pain at night now  No other pain meds      Review of Systems   Eyes: Negative for blurred vision. Respiratory: Negative for cough and shortness of breath. Cardiovascular: Negative for chest pain and leg swelling. Genitourinary: Negative for frequency. Neurological: Negative for dizziness and headaches. Endo/Heme/Allergies: Negative for polydipsia. Past Medical History:   Diagnosis Date    Diabetes ([address]     Hernia of abdominal wall 2012    Hypercholesterolemia     Hypertension     MI, old 2011       Past Surgical History:   Procedure Laterality Date    HX CORONARY STENT PLACEMENT  2011    HX HERNIA REPAIR  2012       Current Outpatient Prescriptions   Medication Sig Dispense Refill    losartan (COZAAR) 100 mg tablet Take 1 Tab by mouth daily. 90 Tab 1    amLODIPine (NORVASC) 5 mg tablet Take 1 Tab by mouth daily. 90 Tab 1    cyclobenzaprine (FLEXERIL) 5 mg tablet Take 1 Tab by mouth nightly. 90 Tab 1    metFORMIN (GLUCOPHAGE) 500 mg tablet Take 2 Tabs by mouth two (2) times daily (with meals). 360 Tab 0    aspirin delayed-release 81 mg tablet 81 mg.      metoprolol succinate (TOPROL-XL) 25 mg XL tablet Take 1 Tab by mouth daily. 90 Tab 1    clopidogrel (PLAVIX) 75 mg tab Take 1 Tab by mouth daily. 90 Tab 3    nitroglycerin (NITROSTAT) 0.4 mg SL tablet 1 Tab by SubLINGual route every five (5) minutes as needed for Chest Pain.  Indications: pt doesn't have any 1 Bottle 3    Blood-Glucose Meter monitoring kit Check Glucose once a day in am fasting and occ 2 h after a meal for DM E11.9 1 Kit 0    Lancets misc Check Glucose once a day for DM E11.9 100 Each 3       Allergies   Allergen Reactions    Other Plant, Animal, Environmental Anaphylaxis     Bee sting, swelling, throat swells, can't breathe    Atorvastatin Calcium Myalgia    Crestor [Rosuvastatin] Myalgia    Zocor [Simvastatin] Myalgia       Visit Vitals    /68    Pulse 67    Temp 97.1 °F (36.2 °C) (Temporal)    Resp 18    Ht 6' (1.829 m)    Wt 215 lb (97.5 kg)    SpO2 96%    BMI 29.16 kg/m2     Physical Exam   Constitutional: He is oriented to person, place, and time. He appears well-developed and well-nourished. No distress. HENT:   Head: Normocephalic and atraumatic. Eyes: Conjunctivae and EOM are normal.   Cardiovascular: Normal rate and regular rhythm. Pulmonary/Chest: Effort normal and breath sounds normal.   Musculoskeletal: He exhibits no edema. Neurological: He is alert and oriented to person, place, and time. Skin: Skin is warm and dry. Psychiatric: He has a normal mood and affect. Nursing note and vitals reviewed. Recent Results (from the past 12 hour(s))   AMB POC GLUCOSE BLOOD, BY GLUCOSE MONITORING DEVICE    Collection Time: 08/30/18  2:26 PM   Result Value Ref Range    Glucose  mg/dL         ASSESSMENT and PLAN    ICD-10-CM ICD-9-CM    1. Type 2 diabetes with nephropathy (HCC) E11.21 250.40 AMB POC GLUCOSE BLOOD, BY GLUCOSE MONITORING DEVICE     583.81 COLLECTION CAPILLARY BLOOD SPECIMEN   2. Essential hypertension I10 401.9 losartan (COZAAR) 100 mg tablet      amLODIPine (NORVASC) 5 mg tablet   3.  Acute low back pain without sciatica, unspecified back pain laterality M54.5 724.2 cyclobenzaprine (FLEXERIL) 5 mg tablet

## 2019-02-04 DIAGNOSIS — E11.9 TYPE 2 DIABETES MELLITUS WITHOUT COMPLICATION, WITHOUT LONG-TERM CURRENT USE OF INSULIN (HCC): ICD-10-CM

## 2019-02-04 RX ORDER — METFORMIN HYDROCHLORIDE 500 MG/1
1000 TABLET ORAL 2 TIMES DAILY WITH MEALS
Qty: 360 TAB | Refills: 0 | OUTPATIENT
Start: 2019-02-04

## 2019-02-04 NOTE — TELEPHONE ENCOUNTER
Pharmacy called to state patient told them he has been out of his metformin for a week now. They are giving him an emergency supply to cover him until they get a response from Dr. More Prado regarding refill.    Their phone number is [phone]

## 2019-02-04 NOTE — TELEPHONE ENCOUNTER
Patient is in [place] for 6 months. Please send refill. Requested Prescriptions     Pending Prescriptions Disp Refills    metFORMIN (GLUCOPHAGE) 500 mg tablet 360 Tab 0     Sig: Take 2 Tabs by mouth two (2) times daily (with meals).

## 2019-02-05 NOTE — TELEPHONE ENCOUNTER
I have called and left a message for this patient to return my call. An apt is required prior to refilling his DM medications.

## 2019-02-05 NOTE — TELEPHONE ENCOUNTER
The patient returns my call. He reports that he is in [place] until the end of April and has been without his medications for a week. He would like to have medications called to his pharmacy in [place] and he will follow up when he gets back.

## 2021-07-01 ENCOUNTER — OFFICE VISIT (OUTPATIENT)
Dept: FAMILY MEDICINE CLINIC | Age: 76
End: 2021-07-01
Payer: MEDICARE

## 2021-07-01 VITALS
DIASTOLIC BLOOD PRESSURE: 67 MMHG | SYSTOLIC BLOOD PRESSURE: 128 MMHG | OXYGEN SATURATION: 97 % | WEIGHT: 211 LBS | HEART RATE: 83 BPM | RESPIRATION RATE: 16 BRPM | TEMPERATURE: 99 F | HEIGHT: 72 IN | BODY MASS INDEX: 28.58 KG/M2

## 2021-07-01 DIAGNOSIS — Z87.891 HISTORY OF TOBACCO USE: ICD-10-CM

## 2021-07-01 DIAGNOSIS — M54.41 CHRONIC RIGHT-SIDED LOW BACK PAIN WITH RIGHT-SIDED SCIATICA: ICD-10-CM

## 2021-07-01 DIAGNOSIS — I25.10 CORONARY ARTERY DISEASE INVOLVING NATIVE HEART WITHOUT ANGINA PECTORIS, UNSPECIFIED VESSEL OR LESION TYPE: ICD-10-CM

## 2021-07-01 DIAGNOSIS — Z00.00 MEDICARE ANNUAL WELLNESS VISIT, SUBSEQUENT: Primary | ICD-10-CM

## 2021-07-01 DIAGNOSIS — E11.21 TYPE 2 DIABETES WITH NEPHROPATHY (HCC): ICD-10-CM

## 2021-07-01 DIAGNOSIS — Z12.11 COLON CANCER SCREENING: ICD-10-CM

## 2021-07-01 DIAGNOSIS — I48.91 ATRIAL FIBRILLATION, UNSPECIFIED TYPE (HCC): ICD-10-CM

## 2021-07-01 DIAGNOSIS — I10 ESSENTIAL HYPERTENSION: ICD-10-CM

## 2021-07-01 DIAGNOSIS — G89.29 CHRONIC RIGHT-SIDED LOW BACK PAIN WITH RIGHT-SIDED SCIATICA: ICD-10-CM

## 2021-07-01 DIAGNOSIS — E78.2 MIXED HYPERLIPIDEMIA: ICD-10-CM

## 2021-07-01 PROCEDURE — 99214 OFFICE O/P EST MOD 30 MIN: CPT | Performed by: FAMILY MEDICINE

## 2021-07-01 PROCEDURE — 1101F PT FALLS ASSESS-DOCD LE1/YR: CPT | Performed by: FAMILY MEDICINE

## 2021-07-01 PROCEDURE — G8536 NO DOC ELDER MAL SCRN: HCPCS | Performed by: FAMILY MEDICINE

## 2021-07-01 PROCEDURE — 36415 COLL VENOUS BLD VENIPUNCTURE: CPT | Performed by: FAMILY MEDICINE

## 2021-07-01 PROCEDURE — G8752 SYS BP LESS 140: HCPCS | Performed by: FAMILY MEDICINE

## 2021-07-01 PROCEDURE — G8510 SCR DEP NEG, NO PLAN REQD: HCPCS | Performed by: FAMILY MEDICINE

## 2021-07-01 PROCEDURE — G8419 CALC BMI OUT NRM PARAM NOF/U: HCPCS | Performed by: FAMILY MEDICINE

## 2021-07-01 PROCEDURE — G0439 PPPS, SUBSEQ VISIT: HCPCS | Performed by: FAMILY MEDICINE

## 2021-07-01 PROCEDURE — 3046F HEMOGLOBIN A1C LEVEL >9.0%: CPT | Performed by: FAMILY MEDICINE

## 2021-07-01 PROCEDURE — 2022F DILAT RTA XM EVC RTNOPTHY: CPT | Performed by: FAMILY MEDICINE

## 2021-07-01 PROCEDURE — G8427 DOCREV CUR MEDS BY ELIG CLIN: HCPCS | Performed by: FAMILY MEDICINE

## 2021-07-01 PROCEDURE — G8754 DIAS BP LESS 90: HCPCS | Performed by: FAMILY MEDICINE

## 2021-07-01 PROCEDURE — 3017F COLORECTAL CA SCREEN DOC REV: CPT | Performed by: FAMILY MEDICINE

## 2021-07-01 RX ORDER — METFORMIN HYDROCHLORIDE 500 MG/1
1000 TABLET ORAL 2 TIMES DAILY WITH MEALS
Qty: 360 TABLET | Refills: 1 | Status: SHIPPED | OUTPATIENT
Start: 2021-07-01 | End: 2022-09-26 | Stop reason: SDUPTHER

## 2021-07-01 RX ORDER — LOSARTAN POTASSIUM 100 MG/1
100 TABLET ORAL DAILY
Qty: 90 TABLET | Refills: 1 | Status: SHIPPED | OUTPATIENT
Start: 2021-07-01 | End: 2022-09-26 | Stop reason: SDUPTHER

## 2021-07-01 RX ORDER — CYCLOBENZAPRINE HCL 5 MG
5 TABLET ORAL
Qty: 90 TABLET | Refills: 1 | Status: SHIPPED | OUTPATIENT
Start: 2021-07-01 | End: 2022-09-26 | Stop reason: SDUPTHER

## 2021-07-01 RX ORDER — CLOPIDOGREL BISULFATE 75 MG/1
75 TABLET ORAL DAILY
Qty: 90 TABLET | Refills: 1 | Status: SHIPPED | OUTPATIENT
Start: 2021-07-01 | End: 2022-09-26 | Stop reason: SDUPTHER

## 2021-07-01 RX ORDER — METOPROLOL SUCCINATE 25 MG/1
25 TABLET, EXTENDED RELEASE ORAL DAILY
Qty: 90 TABLET | Refills: 1 | Status: SHIPPED | OUTPATIENT
Start: 2021-07-01 | End: 2022-09-26 | Stop reason: SDUPTHER

## 2021-07-01 RX ORDER — LOVASTATIN 20 MG/1
20 TABLET ORAL
Qty: 90 TABLET | Refills: 1 | Status: SHIPPED | OUTPATIENT
Start: 2021-07-01 | End: 2022-09-26 | Stop reason: SDUPTHER

## 2021-07-01 RX ORDER — AMLODIPINE BESYLATE 5 MG/1
5 TABLET ORAL DAILY
Qty: 90 TABLET | Refills: 1 | Status: SHIPPED | OUTPATIENT
Start: 2021-07-01 | End: 2022-09-26 | Stop reason: SDUPTHER

## 2021-07-01 NOTE — PROGRESS NOTES
1. Have you been to the ER, urgent care clinic since your last visit? Hospitalized since your last visit? Yes Where: [facility] for a fall    2. Have you seen or consulted any other health care providers outside of the [address] [place] [address] since your last visit? Include any pap smears or colon screening. No     This is the Subsequent Medicare Annual Wellness Exam, performed 12 months or more after the Initial AWV or the last Subsequent AWV    I have reviewed the patient's medical history in detail and updated the computerized patient record. Assessment/Plan   Education and counseling provided:  Are appropriate based on today's review and evaluation    1. Medicare annual wellness visit, subsequent       Depression Risk Factor Screening     3 most recent PHQ Screens 7/1/2021   Little interest or pleasure in doing things Not at all   Feeling down, depressed, irritable, or hopeless Not at all   Total Score PHQ 2 0       Alcohol Risk Screen    Do you average more than 1 drink per night or more than 7 drinks a week: No    In the past three months have you have had more than 4 drinks containing alcohol on one occasion: No        Functional Ability and Level of Safety    Hearing: Hearing is good. Activities of Daily Living: The home contains: no safety equipment. Patient does total self care      Ambulation: with no difficulty     Fall Risk:  Fall Risk Assessment, last 12 mths 7/1/2021   Able to walk? Yes   Fall in past 12 months? 1   Do you feel unsteady? 0   Are you worried about falling 0   Is the gait abnormal? 0   Number of falls in past 12 months 1   Fall with injury?  1      Abuse Screen:  Patient is not abused       Cognitive Screening    Has your family/caregiver stated any concerns about your memory: no     Cognitive Screening: None done today    Health Maintenance Due     Health Maintenance Due   Topic Date Due    DTaP/Tdap/Td series (1 - Tdap) Never done    Shingrix Vaccine Age 50> (1 of 2) Never done    AAA Screening 73-67 YO Male Smoking Patients  Never done    Foot Exam Q1  09/26/2020    Colorectal Cancer Screening Combo  11/19/2020       Patient Care Team   Patient Care Team:  Alison Mcclelland MD as PCP - General (Family Medicine)  Alison Mcclelland MD as PCP - [facility]    History     Patient Active Problem List   Diagnosis Code    Essential hypertension I10    Obesity (BMI 30.0-34. 9) E66.9    Atrial fibrillation (HCC) I48.91    Type 2 diabetes with nephropathy (HCC) E11.21     Past Medical History:   Diagnosis Date    Diabetes ([address]     Hernia of abdominal wall 2012    Hypercholesterolemia     Hypertension     MI, old 2011      Past Surgical History:   Procedure Laterality Date    HX CORONARY STENT PLACEMENT  2011    HX HERNIA REPAIR  2012     Current Outpatient Medications   Medication Sig Dispense Refill    lovastatin (MEVACOR) 20 mg tablet Take 1 Tab by mouth nightly. 30 Tab 3    losartan (COZAAR) 100 mg tablet Take 1 Tab by mouth daily. 90 Tab 1    metFORMIN (GLUCOPHAGE) 500 mg tablet Take 2 Tabs by mouth two (2) times daily (with meals). 360 Tab 1    metoprolol succinate (TOPROL-XL) 25 mg XL tablet Take 1 Tab by mouth daily. 90 Tab 1    clopidogreL (PLAVIX) 75 mg tab Take 1 Tab by mouth daily. 90 Tab 1    diclofenac EC (VOLTAREN) 75 mg EC tablet Take 1 Tab by mouth two (2) times a day. As needed 60 Tab 1    glucose blood VI test strips (Accu-Chek Ana Plus test strp) strip Check Glucose once a day for DM E11.9 100 Strip 3    lancets misc Check Glucose once a day for DM E11.9 100 Each 3    cyclobenzaprine (FLEXERIL) 5 mg tablet Take 1 Tab by mouth nightly. As needed for back spasm 90 Tab 1    amLODIPine (NORVASC) 5 mg tablet Take 1 Tab by mouth daily. 90 Tab 1    aspirin delayed-release 81 mg tablet 81 mg.      nitroglycerin (NITROSTAT) 0.4 mg SL tablet 1 Tab by SubLINGual route every five (5) minutes as needed for Chest Pain.  Indications: pt doesn't have any 1 Bottle 3    Blood-Glucose Meter monitoring kit Check Glucose once a day in am fasting and occ 2 h after a meal for DM E11.9 1 Kit 0     Allergies   Allergen Reactions    Other Plant, Animal, Environmental Anaphylaxis     Bee sting, swelling, throat swells, can't breathe    Atorvastatin Calcium Myalgia    Crestor [Rosuvastatin] Myalgia    Zocor [Simvastatin] Myalgia       Family History   Problem Relation Age of Onset    No Known Problems Mother     Heart Disease Father     Cancer Brother      Social History     Tobacco Use    Smoking status: Former Smoker     Packs/day: 3.00     Years: 20.00     Pack years: 60.00     Quit date: 1985     Years since quittin.9    Smokeless tobacco: Never Used   Substance Use Topics    Alcohol use: No         Functional Ability:   Does the patient exhibit a steady gait? yes   How long did it take the patient to get up and walk from a sitting position? 2 seconds   Is the patient self reliant?  (ie can do own laundry, meals, household chores)  yes     Does the patient handle his/her own medications? yes     Does the patient handle his/her own money? yes     Is the patients home safe (ie good lighting, handrails on stairs and bath, etc.)? yes     Did you notice or did patient express any hearing difficulties? no     Did you notice or did patient express any vision difficulties?   no     Were distance and reading eye charts used? no       Advance Care Planning:   Patient was offered the opportunity to discuss advance care planning:  yes     Does patient have an Advance Directive:  no   If no, did you provide information on Caring Connections?   yes     ADL Assessment 2021   Feeding yourself No Help Needed   Getting from bed to chair No Help Needed   Getting dressed No Help Needed   Bathing or showering No Help Needed   Walk across the room (includes cane/walker) No Help Needed   Using the telphone No Help Needed   Taking your medications No Help Needed   Preparing meals No Help Needed   Managing money (expenses/bills) No Help Needed   Moderately strenuous housework (laundry) No Help Needed   Shopping for personal items (toiletries/medicines) No Help Needed   Shopping for groceries No Help Needed   Driving No Help Needed   Climbing a flight of stairs No Help Needed   Getting to places beyond walking distances No Help Needed       Abuse Screening Questionnaire 7/1/2021   Do you ever feel afraid of your partner? N   Are you in a relationship with someone who physically or mentally threatens you? N   Is it safe for you to go home?  Merlene Alejandro RN

## 2021-07-01 NOTE — ACP (ADVANCE CARE PLANNING)
Non-Provider Advance Care Planning (ACP) Note    Date of ACP Conversation: 7/1/2021  Persons included in Conversation: patient  Length of ACP Conversation in minutes: <16 minutes (Non-Billable)    Conversation requested by:   Provider    Authorized Decision Maker (if patient is incapable of making informed decisions):    This person is:  Next of Kin by law (only applies in absence of a Healthcare Power of  or Legal Guardian)        General ACP for ALL Patients with Decision Making Capacity:    Advance Directive Conversation with Patients who have not yet planned:  Importance of advance care planning, including choosing a healthcare agent to communicate patient's healthcare decisions if patient lost the ability to make decisions, such as after a sudden illness or accident    Review of Existing Advance Directive: (Select questions covered)  N/A    Interventions Provided:  Provided ACP educational materials:  Conversation Starter Kit  Advance Directive Form

## 2021-07-01 NOTE — PATIENT INSTRUCTIONS
Medicare Wellness Visit, Male    The best way to live healthy is to have a lifestyle where you eat a well-balanced diet, exercise regularly, limit alcohol use, and quit all forms of tobacco/nicotine, if applicable. Regular preventive services are another way to keep healthy. Preventive services (vaccines, screening tests, monitoring & exams) can help personalize your care plan, which helps you manage your own care. Screening tests can find health problems at the earliest stages, when they are easiest to treat. [facility] follows the current, evidence-based guidelines published by the [facility] ([facility]) when recommending preventive services for our patients. Because we follow these guidelines, sometimes recommendations change over time as research supports it. (For example, a prostate screening blood test is no longer routinely recommended for men with no symptoms). Of course, you and your doctor may decide to screen more often for some diseases, based on your risk and co-morbidities (chronic disease you are already diagnosed with). Preventive services for you include:  - Medicare offers their members a free annual wellness visit, which is time for you and your primary care provider to discuss and plan for your preventive service needs. Take advantage of this benefit every year!  -All adults over age 72 should receive the recommended pneumonia vaccines. Current USPSTF guidelines recommend a series of two vaccines for the best pneumonia protection.   -All adults should have a flu vaccine yearly and tetanus vaccine every 10 years.  -All adults age 48 and older should receive the shingles vaccines (series of two vaccines).        -All adults age 38-68 who are overweight should have a diabetes screening test once every three years.   -Other screening tests & preventive services for persons with diabetes include: an eye exam to screen for diabetic retinopathy, a kidney function test, a foot exam, and stricter control over your cholesterol.   -Cardiovascular screening for adults with routine risk involves an electrocardiogram (ECG) at intervals determined by the provider.   -Colorectal cancer screening should be done for adults age 54-65 with no increased risk factors for colorectal cancer. There are a number of acceptable methods of screening for this type of cancer. Each test has its own benefits and drawbacks. Discuss with your provider what is most appropriate for you during your annual wellness visit. The different tests include: colonoscopy (considered the best screening method), a fecal occult blood test, a fecal DNA test, and sigmoidoscopy.  -All adults born between [facility] should be screened once for Hepatitis C.  -An Abdominal Aortic Aneurysm (AAA) Screening is recommended for men age 73-68 who has ever smoked in their lifetime.      Here is a list of your current Health Maintenance items (your personalized list of preventive services) with a due date:  Health Maintenance Due   Topic Date Due    DTaP/Tdap/Td  (1 - Tdap) Never done    Shingles Vaccine (1 of 2) Never done    AAA Screening  Never done    Diabetic Foot Care  09/26/2020    Colorectal Screening  11/19/2020

## 2021-07-01 NOTE — PROGRESS NOTES
Tangela Pleitez is a 78 y.o. male who presents to the office today with the following:  Chief Complaint   Patient presents with    Medication Refill    Annual Wellness Visit            HPI   Here for check up  HM reviewed    Back is giving him a fit  On Diclofenac and Flexeril   Needs refills  Sees specialist    HTN  No SE with meds    DM  BS little high 135-162 in am  In pm no BS checks    Hyperlipidemia  No food for 6 h  On Lovastatin and no SE    CAD  On Plavix     No bleeding    Hx of Tob use      ROS  See HPI. Past Medical History:   Diagnosis Date    Diabetes ([address]     Hernia of abdominal wall 2012    Hypercholesterolemia     Hypertension     MI, old 2011       Past Surgical History:   Procedure Laterality Date    HX CORONARY STENT PLACEMENT  2011    HX HERNIA REPAIR  2012       Allergies   Allergen Reactions    Other Plant, Animal, Environmental Anaphylaxis     Bee sting, swelling, throat swells, can't breathe    Atorvastatin Calcium Myalgia    Crestor [Rosuvastatin] Myalgia    Zocor [Simvastatin] Myalgia       Current Outpatient Medications   Medication Sig    lovastatin (MEVACOR) 20 mg tablet Take 1 Tablet by mouth nightly.  losartan (COZAAR) 100 mg tablet Take 1 Tablet by mouth daily.  metFORMIN (GLUCOPHAGE) 500 mg tablet Take 2 Tablets by mouth two (2) times daily (with meals).  metoprolol succinate (TOPROL-XL) 25 mg XL tablet Take 1 Tablet by mouth daily.  clopidogreL (PLAVIX) 75 mg tab Take 1 Tablet by mouth daily.  amLODIPine (NORVASC) 5 mg tablet Take 1 Tablet by mouth daily.  cyclobenzaprine (FLEXERIL) 5 mg tablet Take 1 Tablet by mouth nightly. As needed for back spasm    diclofenac EC (VOLTAREN) 75 mg EC tablet Take 1 Tab by mouth two (2) times a day.  As needed    glucose blood VI test strips (Accu-Chek Ana Plus test strp) strip Check Glucose once a day for DM E11.9    lancets misc Check Glucose once a day for DM E11.9    aspirin delayed-release 81 mg tablet 81 mg.    nitroglycerin (NITROSTAT) 0.4 mg SL tablet 1 Tab by SubLINGual route every five (5) minutes as needed for Chest Pain. Indications: pt doesn't have any    Blood-Glucose Meter monitoring kit Check Glucose once a day in am fasting and occ 2 h after a meal for DM E11.9     No current facility-administered medications for this visit. Social History     Socioeconomic History    Marital status:      Spouse name: Not on file    Number of children: Not on file    Years of education: Not on file    Highest education level: Not on file   Tobacco Use    Smoking status: Former Smoker     Packs/day: 3.00     Years: 20.00     Pack years: 60.00     Quit date: 1985     Years since quittin.9    Smokeless tobacco: Never Used   Substance and Sexual Activity    Alcohol use: No    Drug use: No    Sexual activity: Yes     Partners: Female     Social Determinants of Health     Financial Resource Strain:     Difficulty of Paying Living Expenses:    Food Insecurity:     Worried About Running Out of Food in the Last Year:     [address] in the Last Year:    Transportation Needs:     Lack of Transportation (Medical):      Lack of Transportation (Non-Medical):    Physical Activity:     Days of Exercise per Week:     Minutes of Exercise per Session:    Stress:     Feeling of Stress :    Social Connections:     Frequency of Communication with Friends and Family:     Frequency of Social Gatherings with Friends and Family:     Attends [employer] Services:     Active Member of Clubs or Organizations:     Attends Club or Organization Meetings:     Marital Status:        Family History   Problem Relation Age of Onset    No Known Problems Mother     Heart Disease Father     Cancer Brother          Physical Exam:  Visit Vitals  /67 (BP 1 Location: Left upper arm, BP Patient Position: Sitting, BP Cuff Size: Large adult)   Pulse 83   Temp 99 °F (37.2 °C) (Oral)   Resp 16   Ht 6' (1.829 m)   Wt 211 lb (95.7 kg)   SpO2 97%   BMI 28.62 kg/m²     Physical Exam  Vitals and nursing note reviewed. HENT:      Head: Normocephalic and atraumatic. Right Ear: Tympanic membrane, ear canal and external ear normal.      Left Ear: Tympanic membrane, ear canal and external ear normal.   Eyes:      Extraocular Movements: Extraocular movements intact. Conjunctiva/sclera: Conjunctivae normal.   Cardiovascular:      Rate and Rhythm: Normal rate and regular rhythm. Pulses: Normal pulses. Heart sounds: Normal heart sounds. Pulmonary:      Effort: Pulmonary effort is normal.      Breath sounds: Normal breath sounds. Musculoskeletal:      Right lower leg: No edema. Left lower leg: No edema. Lymphadenopathy:      Cervical: No cervical adenopathy. Neurological:      Mental Status: He is alert and oriented to person, place, and time. Psychiatric:         Mood and Affect: Mood normal.         Behavior: Behavior normal.         Assessment/Plan:    ICD-10-CM ICD-9-CM    1. Medicare annual wellness visit, subsequent  Z00.00 V70.0    2. Type 2 diabetes with nephropathy (HCC)  E11.21 250.40 metFORMIN (GLUCOPHAGE) 500 mg tablet     583.81  DIABETES FOOT EXAM      HEMOGLOBIN A1C WITH EAG   3. Atrial fibrillation, unspecified type ([facility] [address]  I48.91 427.31    4. Mixed hyperlipidemia  E78.2 272.2 lovastatin (MEVACOR) 20 mg tablet      METABOLIC PANEL, COMPREHENSIVE      LIPID PANEL   5. Essential hypertension  I10 401.9 losartan (COZAAR) 100 mg tablet      metoprolol succinate (TOPROL-XL) 25 mg XL tablet      amLODIPine (NORVASC) 5 mg tablet      METABOLIC PANEL, COMPREHENSIVE   6. Coronary artery disease involving native heart without angina pectoris, unspecified vessel or lesion type  I25.10 414.01 clopidogreL (PLAVIX) 75 mg tab      US EXAM SCREENING AAA   7.  Chronic right-sided low back pain with right-sided sciatica  M54.41 724.2 cyclobenzaprine (FLEXERIL) 5 mg tablet    G89.29 724.3 338.29    8. Colon cancer screening  Z12.11 V76.51 OCCULT BLOOD IMMUNOASSAY,DIAGNOSTIC      OCCULT BLOOD IMMUNOASSAY,DIAGNOSTIC   9.  History of tobacco use  Z87.891 V15.82 US EXAM SCREENING AAA           Fritz Toscano MD

## 2021-07-02 LAB
ALBUMIN SERPL-MCNC: 4.2 G/DL (ref 3.5–5)
ALBUMIN/GLOB SERPL: 1.4 {RATIO} (ref 1.1–2.2)
ALP SERPL-CCNC: 89 U/L (ref 45–117)
ALT SERPL-CCNC: 15 U/L (ref 12–78)
ANION GAP SERPL CALC-SCNC: 9 MMOL/L (ref 5–15)
AST SERPL-CCNC: 11 U/L (ref 15–37)
BILIRUB SERPL-MCNC: 0.9 MG/DL (ref 0.2–1)
BUN SERPL-MCNC: 30 MG/DL (ref 6–20)
BUN/CREAT SERPL: 15 (ref 12–20)
CALCIUM SERPL-MCNC: 10.3 MG/DL (ref 8.5–10.1)
CHLORIDE SERPL-SCNC: 106 MMOL/L (ref 97–108)
CHOLEST SERPL-MCNC: 241 MG/DL
CO2 SERPL-SCNC: 23 MMOL/L (ref 21–32)
CREAT SERPL-MCNC: 2.01 MG/DL (ref 0.7–1.3)
EST. AVERAGE GLUCOSE BLD GHB EST-MCNC: 214 MG/DL
GLOBULIN SER CALC-MCNC: 2.9 G/DL (ref 2–4)
GLUCOSE SERPL-MCNC: 136 MG/DL (ref 65–100)
HBA1C MFR BLD: 9.1 % (ref 4–5.6)
HDLC SERPL-MCNC: 41 MG/DL
HDLC SERPL: 5.9 {RATIO} (ref 0–5)
LDLC SERPL CALC-MCNC: 156.4 MG/DL (ref 0–100)
POTASSIUM SERPL-SCNC: 5.6 MMOL/L (ref 3.5–5.1)
PROT SERPL-MCNC: 7.1 G/DL (ref 6.4–8.2)
SODIUM SERPL-SCNC: 138 MMOL/L (ref 136–145)
TRIGL SERPL-MCNC: 218 MG/DL (ref ?–150)
VLDLC SERPL CALC-MCNC: 43.6 MG/DL

## 2021-07-04 NOTE — PROGRESS NOTES
Call pt, the HbA1C is 9.1, which is way too high  The Chol is high as well  The Potassium is high  The kidney tests are elevated  The liver tests are normal  RTC to discuss treatment !

## 2021-07-08 ENCOUNTER — TELEPHONE (OUTPATIENT)
Dept: FAMILY MEDICINE CLINIC | Age: 76
End: 2021-07-08

## 2021-07-09 NOTE — PROGRESS NOTES
Phone number on file is no longer in service. Mailed lab results and recommendations to address on file.

## 2021-08-05 LAB — HEMOCCULT STL QL IA: NEGATIVE

## 2021-09-14 ENCOUNTER — OFFICE VISIT (OUTPATIENT)
Dept: FAMILY MEDICINE CLINIC | Age: 76
End: 2021-09-14
Payer: MEDICARE

## 2021-09-14 VITALS
RESPIRATION RATE: 16 BRPM | OXYGEN SATURATION: 98 % | SYSTOLIC BLOOD PRESSURE: 120 MMHG | HEART RATE: 86 BPM | TEMPERATURE: 97.7 F | DIASTOLIC BLOOD PRESSURE: 78 MMHG

## 2021-09-14 DIAGNOSIS — R07.9 CHEST PAIN WITH HIGH RISK OF ACUTE CORONARY SYNDROME: Primary | ICD-10-CM

## 2021-09-14 LAB — HEMOGLOBIN A1C: 8.2 %

## 2021-09-14 PROCEDURE — G8752 SYS BP LESS 140: HCPCS | Performed by: FAMILY MEDICINE

## 2021-09-14 PROCEDURE — G8427 DOCREV CUR MEDS BY ELIG CLIN: HCPCS | Performed by: FAMILY MEDICINE

## 2021-09-14 PROCEDURE — G8536 NO DOC ELDER MAL SCRN: HCPCS | Performed by: FAMILY MEDICINE

## 2021-09-14 PROCEDURE — 3017F COLORECTAL CA SCREEN DOC REV: CPT | Performed by: FAMILY MEDICINE

## 2021-09-14 PROCEDURE — 93010 ELECTROCARDIOGRAM REPORT: CPT | Performed by: FAMILY MEDICINE

## 2021-09-14 PROCEDURE — G8432 DEP SCR NOT DOC, RNG: HCPCS | Performed by: FAMILY MEDICINE

## 2021-09-14 PROCEDURE — 99214 OFFICE O/P EST MOD 30 MIN: CPT | Performed by: FAMILY MEDICINE

## 2021-09-14 PROCEDURE — G8754 DIAS BP LESS 90: HCPCS | Performed by: FAMILY MEDICINE

## 2021-09-14 PROCEDURE — G8419 CALC BMI OUT NRM PARAM NOF/U: HCPCS | Performed by: FAMILY MEDICINE

## 2021-09-14 PROCEDURE — 1101F PT FALLS ASSESS-DOCD LE1/YR: CPT | Performed by: FAMILY MEDICINE

## 2021-09-14 NOTE — PROGRESS NOTES
Pt HX of heart attack and 2 stents in 2011. Patient given Aspirin 325 MG at 1:30 pm.    Patient given Nitro at 1:40 D/T pain increase from 6/10 to 8/10. /80 before nitro. /78 5 minutes after Nitro. 9/14/2021      Chief Complaint   Patient presents with    Chest Pain     Woke up with discomfort in his left arm, left upper chest, and pressure in his throat         History of Present Illness:        Naida Bishop is a 78 y.o. male woke with CP radiating to L arm and neck this am, had recurrence of sxs just before coming to office. Similar to his MI treated with stents in 2011, relieved with NTG. Has not seen cardiology in several years. No SOB, N/V. Allergies   Allergen Reactions    Other Plant, Animal, Environmental Anaphylaxis     Bee sting, swelling, throat swells, can't breathe    Atorvastatin Calcium Myalgia    Crestor [Rosuvastatin] Myalgia    Zocor [Simvastatin] Myalgia       Current Outpatient Medications   Medication Sig    lovastatin (MEVACOR) 20 mg tablet Take 1 Tablet by mouth nightly.  losartan (COZAAR) 100 mg tablet Take 1 Tablet by mouth daily.  metFORMIN (GLUCOPHAGE) 500 mg tablet Take 2 Tablets by mouth two (2) times daily (with meals).  metoprolol succinate (TOPROL-XL) 25 mg XL tablet Take 1 Tablet by mouth daily.  clopidogreL (PLAVIX) 75 mg tab Take 1 Tablet by mouth daily.  amLODIPine (NORVASC) 5 mg tablet Take 1 Tablet by mouth daily.  cyclobenzaprine (FLEXERIL) 5 mg tablet Take 1 Tablet by mouth nightly. As needed for back spasm    diclofenac EC (VOLTAREN) 75 mg EC tablet Take 1 Tab by mouth two (2) times a day. As needed    glucose blood VI test strips (Accu-Chek Ana Plus test strp) strip Check Glucose once a day for DM E11.9    lancets misc Check Glucose once a day for DM E11.9    aspirin delayed-release 81 mg tablet 81 mg.    nitroglycerin (NITROSTAT) 0.4 mg SL tablet 1 Tab by SubLINGual route every five (5) minutes as needed for Chest Pain. Indications: pt doesn't have any    Blood-Glucose Meter monitoring kit Check Glucose once a day in am fasting and occ 2 h after a meal for DM E11.9     No current facility-administered medications for this visit. Physical Examination:    Visit Vitals  /78 (BP 1 Location: Right arm, BP Patient Position: Sitting, BP Cuff Size: Large adult)   Pulse 86   Temp 97.7 °F (36.5 °C) (Temporal)   Resp 16   SpO2 98%      General:  Alert, cooperative, no distress. HEENT:  Normocephalic, without obvious abnormality, atraumatic. Conjunctivae/corneas clear. Pupils equal, round, reactive to light. Extraocular movements intact. TMs and external canals normal bilaterally. Nasal mucosa and oropharynx clear. Lungs: Clear to auscultation bilaterally. Chest wall:  No tenderness or deformity. Heart:  Regular rate and rhythm, S1, S2 normal, no murmur, click, rub, or gallop. Abdomen:   Soft, non-tender. Bowel sounds normal. No masses. No organomegaly. Extremities: Extremities normal, atraumatic, no cyanosis or edema. Pulses: 2+ and symmetric all extremities. Skin: Skin color, texture, turgor normal. No rashes or lesions. Lymph nodes: Cervical, supraclavicular, and axillary nodes normal.   Neurologic: CNII-XII intact. Normal strength, sensation, and reflexes throughout.     ecg- NSST change with depression in inferior leads. No comparison available. ASSESSMENT AND PLAN    1. Chest pain with high risk of acute coronary syndrome  Given full strength ASA. Discussed with Dr. Jada Robert.  For EMS transport directly to ED.  - AMB POC EKG ROUTINE W/ 12 LEADS, INTER & REP            Orders Placed This Encounter    AMB POC EKG ROUTINE W/ 12 LEADS, INTER & REP     Order Specific Question:   Reason for Exam:     Answer:   chest pain           Niya Jacinto MD

## 2022-03-18 PROBLEM — E66.811 OBESITY (BMI 30.0-34.9): Status: ACTIVE | Noted: 2018-07-17

## 2022-03-18 PROBLEM — E66.9 OBESITY (BMI 30.0-34.9): Status: ACTIVE | Noted: 2018-07-17

## 2022-03-18 PROBLEM — I10 ESSENTIAL HYPERTENSION: Status: ACTIVE | Noted: 2018-07-17

## 2022-03-18 PROBLEM — I48.91 ATRIAL FIBRILLATION (HCC): Status: ACTIVE | Noted: 2018-07-18

## 2022-03-19 PROBLEM — E11.21 TYPE 2 DIABETES WITH NEPHROPATHY (HCC): Status: ACTIVE | Noted: 2018-07-31

## 2022-07-20 NOTE — PROGRESS NOTES
Notify pt a1c still a bit elevated but has improved from last year. Should try going to two of this 500 mg Metformin BID, f/up with PCP with BS log in 2 weeks, continue to work on LMs.

## 2022-07-21 ENCOUNTER — TELEPHONE (OUTPATIENT)
Dept: FAMILY MEDICINE CLINIC | Age: 77
End: 2022-07-21

## 2022-07-21 NOTE — TELEPHONE ENCOUNTER
----- Message from Shania Tubbs PA-C sent at 7/20/2022  4:50 PM EDT -----  Notify pt a1c still a bit elevated but has improved from last year. Should try going to two of this 500 mg Metformin BID, f/up with PCP with BS log in 2 weeks, continue to work on LMs.

## 2022-07-26 NOTE — PROGRESS NOTES
This was a result from 9/14/21 done in the hospital, it was abstracted by the [facility] team does not need to be addressed

## 2022-09-26 ENCOUNTER — OFFICE VISIT (OUTPATIENT)
Dept: FAMILY MEDICINE CLINIC | Age: 77
End: 2022-09-26
Payer: MEDICARE

## 2022-09-26 VITALS
DIASTOLIC BLOOD PRESSURE: 82 MMHG | WEIGHT: 200 LBS | BODY MASS INDEX: 28 KG/M2 | HEART RATE: 87 BPM | RESPIRATION RATE: 12 BRPM | SYSTOLIC BLOOD PRESSURE: 152 MMHG | HEIGHT: 71 IN | TEMPERATURE: 97.7 F | OXYGEN SATURATION: 98 %

## 2022-09-26 DIAGNOSIS — I25.10 CORONARY ARTERY DISEASE INVOLVING NATIVE HEART WITHOUT ANGINA PECTORIS, UNSPECIFIED VESSEL OR LESION TYPE: ICD-10-CM

## 2022-09-26 DIAGNOSIS — M54.41 CHRONIC RIGHT-SIDED LOW BACK PAIN WITH RIGHT-SIDED SCIATICA: ICD-10-CM

## 2022-09-26 DIAGNOSIS — Z12.11 COLON CANCER SCREENING: ICD-10-CM

## 2022-09-26 DIAGNOSIS — E78.2 MIXED HYPERLIPIDEMIA: ICD-10-CM

## 2022-09-26 DIAGNOSIS — E11.21 TYPE 2 DIABETES WITH NEPHROPATHY (HCC): ICD-10-CM

## 2022-09-26 DIAGNOSIS — G89.29 CHRONIC RIGHT-SIDED LOW BACK PAIN WITH RIGHT-SIDED SCIATICA: ICD-10-CM

## 2022-09-26 DIAGNOSIS — Z00.00 MEDICARE ANNUAL WELLNESS VISIT, SUBSEQUENT: Primary | ICD-10-CM

## 2022-09-26 DIAGNOSIS — I10 ESSENTIAL HYPERTENSION: ICD-10-CM

## 2022-09-26 PROCEDURE — 99214 OFFICE O/P EST MOD 30 MIN: CPT | Performed by: FAMILY MEDICINE

## 2022-09-26 PROCEDURE — G0439 PPPS, SUBSEQ VISIT: HCPCS | Performed by: FAMILY MEDICINE

## 2022-09-26 RX ORDER — LOVASTATIN 20 MG/1
20 TABLET ORAL
Qty: 90 TABLET | Refills: 1 | Status: SHIPPED | OUTPATIENT
Start: 2022-09-26 | End: 2022-09-28 | Stop reason: DRUGHIGH

## 2022-09-26 RX ORDER — AMLODIPINE BESYLATE 5 MG/1
5 TABLET ORAL DAILY
Qty: 90 TABLET | Refills: 1 | Status: SHIPPED | OUTPATIENT
Start: 2022-09-26 | End: 2022-09-26 | Stop reason: DRUGHIGH

## 2022-09-26 RX ORDER — METFORMIN HYDROCHLORIDE 500 MG/1
1000 TABLET ORAL 2 TIMES DAILY WITH MEALS
Qty: 360 TABLET | Refills: 1 | Status: SHIPPED | OUTPATIENT
Start: 2022-09-26 | End: 2022-09-28

## 2022-09-26 RX ORDER — CYCLOBENZAPRINE HCL 5 MG
5 TABLET ORAL
Qty: 90 TABLET | Refills: 1 | Status: SHIPPED | OUTPATIENT
Start: 2022-09-26

## 2022-09-26 RX ORDER — CLOPIDOGREL BISULFATE 75 MG/1
75 TABLET ORAL DAILY
Qty: 90 TABLET | Refills: 1 | Status: SHIPPED | OUTPATIENT
Start: 2022-09-26

## 2022-09-26 RX ORDER — METOPROLOL SUCCINATE 25 MG/1
25 TABLET, EXTENDED RELEASE ORAL DAILY
Qty: 90 TABLET | Refills: 1 | Status: SHIPPED | OUTPATIENT
Start: 2022-09-26 | End: 2022-10-20 | Stop reason: DRUGHIGH

## 2022-09-26 RX ORDER — ISOSORBIDE MONONITRATE 30 MG/1
30 TABLET, EXTENDED RELEASE ORAL DAILY
COMMUNITY

## 2022-09-26 RX ORDER — AMLODIPINE BESYLATE 10 MG/1
10 TABLET ORAL DAILY
Qty: 90 TABLET | Refills: 0 | Status: SHIPPED | OUTPATIENT
Start: 2022-09-26

## 2022-09-26 RX ORDER — LOSARTAN POTASSIUM 100 MG/1
100 TABLET ORAL DAILY
Qty: 90 TABLET | Refills: 1 | Status: SHIPPED | OUTPATIENT
Start: 2022-09-26

## 2022-09-26 NOTE — PATIENT INSTRUCTIONS
Medicare Wellness Visit, Male    The best way to live healthy is to have a lifestyle where you eat a well-balanced diet, exercise regularly, limit alcohol use, and quit all forms of tobacco/nicotine, if applicable. Regular preventive services are another way to keep healthy. Preventive services (vaccines, screening tests, monitoring & exams) can help personalize your care plan, which helps you manage your own care. Screening tests can find health problems at the earliest stages, when they are easiest to treat. [facility] follows the current, evidence-based guidelines published by the [facility] ([facility] [[email or web address]]STF) when recommending preventive services for our patients. Because we follow these guidelines, sometimes recommendations change over time as research supports it. (For example, a prostate screening blood test is no longer routinely recommended for men with no symptoms). Of course, you and your doctor may decide to screen more often for some diseases, based on your risk and co-morbidities (chronic disease you are already diagnosed with). Preventive services for you include:  - Medicare offers their members a free annual wellness visit, which is time for you and your primary care provider to discuss and plan for your preventive service needs. Take advantage of this benefit every year!  -All adults over age 72 should receive the recommended pneumonia vaccines. Current USPSTF guidelines recommend a series of two vaccines for the best pneumonia protection.   -All adults should have a flu vaccine yearly and tetanus vaccine every 10 years.  -All adults age 48 and older should receive the shingles vaccines (series of two vaccines).        -All adults age 38-68 who are overweight should have a diabetes screening test once every three years.   -Other screening tests & preventive services for persons with diabetes include: an eye exam to screen for diabetic retinopathy, a kidney function test, a foot exam, and stricter control over your cholesterol.   -Cardiovascular screening for adults with routine risk involves an electrocardiogram (ECG) at intervals determined by the provider.   -Colorectal cancer screening should be done for adults age 54-65 with no increased risk factors for colorectal cancer. There are a number of acceptable methods of screening for this type of cancer. Each test has its own benefits and drawbacks. Discuss with your provider what is most appropriate for you during your annual wellness visit. The different tests include: colonoscopy (considered the best screening method), a fecal occult blood test, a fecal DNA test, and sigmoidoscopy.  -All adults born between [facility] should be screened once for Hepatitis C.  -An Abdominal Aortic Aneurysm (AAA) Screening is recommended for men age 73-68 who has ever smoked in their lifetime.      Here is a list of your current Health Maintenance items (your personalized list of preventive services) with a due date:  Health Maintenance Due   Topic Date Due    Eye Exam  Never done    DTaP/Tdap/Td  (1 - Tdap) Never done    Shingles Vaccine (1 of 2) Never done    Albumin Urine Test  08/06/2021    COVID-19 Vaccine (3 - Booster for Moderna series) 10/05/2021    Depresssion Screening  07/01/2022    Diabetic Foot Care  07/01/2022    Yearly Flu Vaccine (1) 08/01/2022    Colorectal Screening  08/03/2022    Hemoglobin A1C    09/14/2022    Cholesterol Test   09/16/2022

## 2022-09-26 NOTE — PROGRESS NOTES
1. \"Have you been to the ER, urgent care clinic since your last visit? Hospitalized since your last visit? \" No    2. \"Have you seen or consulted any other health care providers outside of the [address] [place] [address] since your last visit? \" No     3. For patients aged 39-70: Has the patient had a colonoscopy / FIT/ Cologuard? NA - based on age      If the patient is female:    4. For patients aged 41-77: Has the patient had a mammogram within the past 2 years? NA - based on age or sex      11. For patients aged 21-65: Has the patient had a pap smear? NA - based on age or sex  This is the Subsequent Medicare Annual Wellness Exam, performed 12 months or more after the Initial AWV or the last Subsequent AWV    I have reviewed the patient's medical history in detail and updated the computerized patient record. Assessment/Plan   Education and counseling provided:  Are appropriate based on today's review and evaluation    1. Medicare annual wellness visit, subsequent     Functional Ability:   Does the patient exhibit a steady gait? yes   How long did it take the patient to get up and walk from a sitting position? 5   Is the patient self reliant?  (ie can do own laundry, meals, household chores)  yes     Does the patient handle his/her own medications? yes     Does the patient handle his/her own money? yes     Is the patients home safe (ie good lighting, handrails on stairs and bath, etc.)? yes     Did you notice or did patient express any hearing difficulties? no     Did you notice or did patient express any vision difficulties?   no     Were distance and reading eye charts used? no       Advance Care Planning:   Patient was offered the opportunity to discuss advance care planning:  yes     Does patient have an Advance Directive:  no   If no, did you provide information on Caring Connections?   yes     ADL Assessment 7/1/2021   Feeding yourself No Help Needed   Getting from bed to chair No Help Needed Getting dressed No Help Needed   Bathing or showering No Help Needed   Walk across the room (includes cane/walker) No Help Needed   Using the telphone No Help Needed   Taking your medications No Help Needed   Preparing meals No Help Needed   Managing money (expenses/bills) No Help Needed   Moderately strenuous housework (laundry) No Help Needed   Shopping for personal items (toiletries/medicines) No Help Needed   Shopping for groceries No Help Needed   Driving No Help Needed   Climbing a flight of stairs No Help Needed   Getting to places beyond walking distances No Help Needed       Abuse Screening Questionnaire 7/1/2021   Do you ever feel afraid of your partner? N   Are you in a relationship with someone who physically or mentally threatens you? N   Is it safe for you to go home? Y       Depression Risk Factor Screening     3 most recent PHQ Screens 7/1/2021   Little interest or pleasure in doing things Not at all   Feeling down, depressed, irritable, or hopeless Not at all   Total Score PHQ 2 0       Alcohol & Drug Abuse Risk Screen    Do you average more than 1 drink per night or more than 7 drinks a week: No    In the past three months have you have had more than 4 drinks containing alcohol on one occasion: No          Functional Ability and Level of Safety    Hearing: Hearing is good. Activities of Daily Living: The home contains: no safety equipment. Patient does total self care      Ambulation: with no difficulty     Fall Risk:  Fall Risk Assessment, last 12 mths 7/1/2021   Able to walk? Yes   Fall in past 12 months? 1   Do you feel unsteady? 0   Are you worried about falling 0   Is the gait abnormal? 0   Number of falls in past 12 months 1   Fall with injury?  1      Abuse Screen:  Patient is not abused       Cognitive Screening    Has your family/caregiver stated any concerns about your memory: no     Cognitive Screening: na    Health Maintenance Due     Health Maintenance Due   Topic Date Due Eye Exam Retinal or Dilated  Never done    DTaP/Tdap/Td series (1 - Tdap) Never done    Shingrix Vaccine Age 50> (1 of 2) Never done    MICROALBUMIN Q1  08/06/2021    COVID-19 Vaccine (3 - Booster for Moderna series) 10/05/2021    Depression Screen  07/01/2022    Foot Exam Q1  07/01/2022    Flu Vaccine (1) 08/01/2022    Colorectal Cancer Screening Combo  08/03/2022    A1C test (Diabetic or Prediabetic)  09/14/2022    Lipid Screen  09/16/2022       Patient Care Team   Patient Care Team:  Mealnia Shelton MD as PCP - General (Family Medicine)  Melania Shelton MD as PCP - [facility] Provider    History     Patient Active Problem List   Diagnosis Code    Essential hypertension I10    Obesity (BMI 30.0-34. 9) E66.9    Atrial fibrillation (HCC) I48.91    Type 2 diabetes with nephropathy (HCC) E11.21    CAD (coronary artery disease), native coronary artery I25.10     Past Medical History:   Diagnosis Date    CAD (coronary artery disease), native coronary artery     MI with RCA stent 2011. NSTEMI with L circumflex stent 2021    Diabetes ([facility] [address]     Hernia of abdominal wall 2012    Hypercholesterolemia     Hypertension     MI, old 2011      Past Surgical History:   Procedure Laterality Date    HX CORONARY STENT PLACEMENT  2011    HX HERNIA REPAIR  2012    HX PTCA  09/2021    SANTO L Circumflex artery Dr. Edie Ruffin     Current Outpatient Medications   Medication Sig Dispense Refill    isosorbide mononitrate ER (IMDUR) 30 mg tablet Take 30 mg by mouth daily. lovastatin (MEVACOR) 20 mg tablet Take 1 Tablet by mouth nightly. 90 Tablet 1    losartan (COZAAR) 100 mg tablet Take 1 Tablet by mouth daily. 90 Tablet 1    metFORMIN (GLUCOPHAGE) 500 mg tablet Take 2 Tablets by mouth two (2) times daily (with meals). 360 Tablet 1    metoprolol succinate (TOPROL-XL) 25 mg XL tablet Take 1 Tablet by mouth daily. 90 Tablet 1    amLODIPine (NORVASC) 5 mg tablet Take 1 Tablet by mouth daily.  90 Tablet 1    cyclobenzaprine (FLEXERIL) 5 mg tablet Take 1 Tablet by mouth nightly. As needed for back spasm 90 Tablet 1    diclofenac EC (VOLTAREN) 75 mg EC tablet Take 1 Tab by mouth two (2) times a day. As needed 60 Tab 1    glucose blood VI test strips (Accu-Chek Ana Plus test strp) strip Check Glucose once a day for DM E11.9 100 Strip 3    lancets misc Check Glucose once a day for DM E11.9 100 Each 3    aspirin delayed-release 81 mg tablet 81 mg. Blood-Glucose Meter monitoring kit Check Glucose once a day in am fasting and occ 2 h after a meal for DM E11.9 1 Kit 0    clopidogreL (PLAVIX) 75 mg tab Take 1 Tablet by mouth daily. (Patient not taking: Reported on 2022) 90 Tablet 1    nitroglycerin (NITROSTAT) 0.4 mg SL tablet 1 Tab by SubLINGual route every five (5) minutes as needed for Chest Pain.  Indications: pt doesn't have any 1 Bottle 3     Allergies   Allergen Reactions    Other Plant, Animal, Environmental Anaphylaxis     Bee sting, swelling, throat swells, can't breathe    Atorvastatin Calcium Myalgia    Crestor [Rosuvastatin] Myalgia    Zocor [Simvastatin] Myalgia       Family History   Problem Relation Age of Onset    No Known Problems Mother     Heart Disease Father     Cancer Brother      Social History     Tobacco Use    Smoking status: Former     Packs/day: 3.00     Years: 20.00     Pack years: 60.00     Types: Cigarettes     Quit date: 1985     Years since quittin.2    Smokeless tobacco: Never   Substance Use Topics    Alcohol use: No         Yuliana Corona LPN

## 2022-09-26 NOTE — PROGRESS NOTES
Alesha Courtney is a 78 y.o. male who presents to the office today with the following:  Chief Complaint   Patient presents with    Diabetes     Follow up    Annual Wellness Visit            HPI  DM  No BS checks  Still taking Metformin    HTN  BP high  Out of Metoprolol  Has not been taking Losartan   Now on Amlodipine 10 mg, was increased a year ago by Cardiologist    Hyperlipidemia  Did not tolerate Lipitor, caused diarrhea  Back on Lovastatin  Fasting for BW    CAD  Not taking Metoprolol  But med was refilled  Has to go to [facility]  Has not seen Cardiologist for over a year  Had apt but missed it and did not reschedule  Has not taken Plavix since out  Walking 3 miles a week    Needs refill of Flexeril     reviewed    Review of Systems   Respiratory:  Negative for cough and shortness of breath. Cardiovascular:  Negative for chest pain and leg swelling. Neurological:  Negative for dizziness and headaches. See HPI. Past Medical History:   Diagnosis Date    CAD (coronary artery disease), native coronary artery     MI with RCA stent 2011. NSTEMI with L circumflex stent 2021    Diabetes ([address]     Hernia of abdominal wall 2012    Hypercholesterolemia     Hypertension     MI, old 2011       Past Surgical History:   Procedure Laterality Date    HX CORONARY STENT PLACEMENT  2011 [address] HERNIA REPAIR  2012    HX PTCA  09/2021    SANTO L Circumflex artery Dr. Estelita Brown   Allergen Reactions    Other Plant, Animal, Environmental Anaphylaxis     Bee sting, swelling, throat swells, can't breathe    Atorvastatin Calcium Myalgia    Crestor [Rosuvastatin] Myalgia    Zocor [Simvastatin] Myalgia       Current Outpatient Medications   Medication Sig    isosorbide mononitrate ER (IMDUR) 30 mg tablet Take 30 mg by mouth daily. clopidogreL (PLAVIX) 75 mg tab Take 1 Tablet by mouth daily. metoprolol succinate (TOPROL-XL) 25 mg XL tablet Take 1 Tablet by mouth daily.     lovastatin (MEVACOR) 20 mg tablet Take 1 Tablet by mouth nightly. losartan (COZAAR) 100 mg tablet Take 1 Tablet by mouth daily. metFORMIN (GLUCOPHAGE) 500 mg tablet Take 2 Tablets by mouth two (2) times daily (with meals). cyclobenzaprine (FLEXERIL) 5 mg tablet Take 1 Tablet by mouth nightly. As needed for back spasm    amLODIPine (NORVASC) 10 mg tablet Take 1 Tablet by mouth daily. diclofenac EC (VOLTAREN) 75 mg EC tablet Take 1 Tab by mouth two (2) times a day. As needed    glucose blood VI test strips (Accu-Chek Ana Plus test strp) strip Check Glucose once a day for DM E11.9    lancets misc Check Glucose once a day for DM E11.9    aspirin delayed-release 81 mg tablet 81 mg. Blood-Glucose Meter monitoring kit Check Glucose once a day in am fasting and occ 2 h after a meal for DM E11.9    nitroglycerin (NITROSTAT) 0.4 mg SL tablet 1 Tab by SubLINGual route every five (5) minutes as needed for Chest Pain. Indications: pt doesn't have any     No current facility-administered medications for this visit. Social History     Socioeconomic History    Marital status:    Tobacco Use    Smoking status: Former     Packs/day: 3.00     Years: 20.00     Pack years: 60.00     Types: Cigarettes     Quit date: 1985     Years since quittin.2    Smokeless tobacco: Never   Substance and Sexual Activity    Alcohol use: No    Drug use: No    Sexual activity: Yes     Partners: Female       Family History   Problem Relation Age of Onset    No Known Problems Mother     Heart Disease Father     Cancer Brother          Physical Exam:  Visit Vitals  BP (!) 152/82   Pulse 87   Temp 97.7 °F (36.5 °C)   Resp 12   Ht 5' 11\" (1.803 m)   Wt 200 lb (90.7 kg)   SpO2 98%   BMI 27.89 kg/m²     Physical Exam  Vitals and nursing note reviewed. HENT:      Head: Normocephalic and atraumatic. Eyes:      Extraocular Movements: Extraocular movements intact.       Conjunctiva/sclera: Conjunctivae normal.   Cardiovascular:      Rate and Rhythm: Normal rate and regular rhythm. Heart sounds: Normal heart sounds. Pulmonary:      Effort: Pulmonary effort is normal.      Breath sounds: Normal breath sounds. Musculoskeletal:      Right lower leg: No edema. Left lower leg: No edema. Neurological:      Mental Status: He is alert and oriented to person, place, and time. Psychiatric:         Mood and Affect: Mood normal.         Behavior: Behavior normal.       Assessment/Plan:    ICD-10-CM ICD-9-CM    1. Medicare annual wellness visit, subsequent  Z00.00 V70.0       2. Type 2 diabetes with nephropathy (HCC)  E11.21 250.40 HEMOGLOBIN A1C WITH EAG     583.81  DIABETES FOOT EXAM      MICROALBUMIN, UR, RAND W/ MICROALB/CREAT RATIO      metFORMIN (GLUCOPHAGE) 500 mg tablet      HEMOGLOBIN A1C WITH EAG      MICROALBUMIN, UR, RAND W/ MICROALB/CREAT RATIO      3. Mixed hyperlipidemia  G64.7 652.0 METABOLIC PANEL, COMPREHENSIVE      LIPID PANEL      lovastatin (MEVACOR) 20 mg tablet      LIPID PANEL      METABOLIC PANEL, COMPREHENSIVE      4. Essential hypertension  I10 401.9 CBC WITH AUTOMATED DIFF      metoprolol succinate (TOPROL-XL) 25 mg XL tablet      losartan (COZAAR) 100 mg tablet      amLODIPine (NORVASC) 10 mg tablet      CBC WITH AUTOMATED DIFF      DISCONTINUED: amLODIPine (NORVASC) 5 mg tablet      5. Coronary artery disease involving native heart without angina pectoris, unspecified vessel or lesion type  I25.10 414.01 clopidogreL (PLAVIX) 75 mg tab      6. Chronic right-sided low back pain with right-sided sciatica  M54.41 724.2 cyclobenzaprine (FLEXERIL) 5 mg tablet    G89.29 724.3      338.29       7. Colon cancer screening  Z12.11 V76.51 OCCULT BLOOD IMMUNOASSAY,DIAGNOSTIC      OCCULT BLOOD IMMUNOASSAY,DIAGNOSTIC        Recheck BP in 1 mo  Advised to make F/U apt with Cardiologist    Follow-up and Dispositions    Return in about 4 weeks (around 10/24/2022).          Luci Bo MD

## 2022-09-27 LAB
ALBUMIN SERPL-MCNC: 4 G/DL (ref 3.5–5)
ALBUMIN/GLOB SERPL: 1.2 {RATIO} (ref 1.1–2.2)
ALP SERPL-CCNC: 107 U/L (ref 45–117)
ALT SERPL-CCNC: 17 U/L (ref 12–78)
ANION GAP SERPL CALC-SCNC: 5 MMOL/L (ref 5–15)
AST SERPL-CCNC: 11 U/L (ref 15–37)
BASOPHILS # BLD: 0.1 K/UL (ref 0–0.1)
BASOPHILS NFR BLD: 2 % (ref 0–1)
BILIRUB SERPL-MCNC: 1.3 MG/DL (ref 0.2–1)
BUN SERPL-MCNC: 22 MG/DL (ref 6–20)
BUN/CREAT SERPL: 10 (ref 12–20)
CALCIUM SERPL-MCNC: 10.7 MG/DL (ref 8.5–10.1)
CHLORIDE SERPL-SCNC: 105 MMOL/L (ref 97–108)
CHOLEST SERPL-MCNC: 244 MG/DL
CO2 SERPL-SCNC: 27 MMOL/L (ref 21–32)
CREAT SERPL-MCNC: 2.11 MG/DL (ref 0.7–1.3)
CREAT UR-MCNC: 238 MG/DL
DIFFERENTIAL METHOD BLD: ABNORMAL
EOSINOPHIL # BLD: 0.3 K/UL (ref 0–0.4)
EOSINOPHIL NFR BLD: 4 % (ref 0–7)
ERYTHROCYTE [DISTWIDTH] IN BLOOD BY AUTOMATED COUNT: 13 % (ref 11.5–14.5)
EST. AVERAGE GLUCOSE BLD GHB EST-MCNC: 237 MG/DL
GLOBULIN SER CALC-MCNC: 3.4 G/DL (ref 2–4)
GLUCOSE SERPL-MCNC: 308 MG/DL (ref 65–100)
HBA1C MFR BLD: 9.9 % (ref 4–5.6)
HCT VFR BLD AUTO: 51.5 % (ref 36.6–50.3)
HDLC SERPL-MCNC: 44 MG/DL
HDLC SERPL: 5.5 {RATIO} (ref 0–5)
HGB BLD-MCNC: 16.7 G/DL (ref 12.1–17)
IMM GRANULOCYTES # BLD AUTO: 0 K/UL (ref 0–0.04)
IMM GRANULOCYTES NFR BLD AUTO: 0 % (ref 0–0.5)
LDLC SERPL CALC-MCNC: 148.4 MG/DL (ref 0–100)
LYMPHOCYTES # BLD: 1.6 K/UL (ref 0.8–3.5)
LYMPHOCYTES NFR BLD: 25 % (ref 12–49)
MCH RBC QN AUTO: 31 PG (ref 26–34)
MCHC RBC AUTO-ENTMCNC: 32.4 G/DL (ref 30–36.5)
MCV RBC AUTO: 95.5 FL (ref 80–99)
MICROALBUMIN UR-MCNC: 29.3 MG/DL
MICROALBUMIN/CREAT UR-RTO: 123 MG/G (ref 0–30)
MONOCYTES # BLD: 0.5 K/UL (ref 0–1)
MONOCYTES NFR BLD: 8 % (ref 5–13)
NEUTS SEG # BLD: 4 K/UL (ref 1.8–8)
NEUTS SEG NFR BLD: 61 % (ref 32–75)
NRBC # BLD: 0 K/UL (ref 0–0.01)
NRBC BLD-RTO: 0 PER 100 WBC
PLATELET # BLD AUTO: 273 K/UL (ref 150–400)
PMV BLD AUTO: 10.5 FL (ref 8.9–12.9)
POTASSIUM SERPL-SCNC: 4.4 MMOL/L (ref 3.5–5.1)
PROT SERPL-MCNC: 7.4 G/DL (ref 6.4–8.2)
RBC # BLD AUTO: 5.39 M/UL (ref 4.1–5.7)
SODIUM SERPL-SCNC: 137 MMOL/L (ref 136–145)
TRIGL SERPL-MCNC: 258 MG/DL (ref ?–150)
VLDLC SERPL CALC-MCNC: 51.6 MG/DL
WBC # BLD AUTO: 6.5 K/UL (ref 4.1–11.1)

## 2022-09-28 DIAGNOSIS — N19 RENAL FAILURE, UNSPECIFIED CHRONICITY: Primary | ICD-10-CM

## 2022-09-28 RX ORDER — LOVASTATIN 40 MG/1
40 TABLET ORAL
Qty: 90 TABLET | Refills: 0 | Status: SHIPPED | OUTPATIENT
Start: 2022-09-28

## 2022-09-28 NOTE — PROGRESS NOTES
Call pt, the urine is showing some protein leakage, cont the Losartan to help protect your kidneys  The Chol is high, increase the Lovastatin to 40 mg, I will put in another order and recheck in 3 mo  The Kidney tests are high, he needs to stop the Diclofenac and use Tylenol for pain and I will refer him to a Nephrologist  Also stop the Metformin and make apt to discuss treating the DM with other medications and poss Insulin Since the HbA1C is 9.9, much too high with the Glucose at 300  The CBC is ok  The liver tests are normal

## 2022-10-20 ENCOUNTER — NURSE TRIAGE (OUTPATIENT)
Dept: OTHER | Facility: CLINIC | Age: 77
End: 2022-10-20

## 2022-10-20 ENCOUNTER — OFFICE VISIT (OUTPATIENT)
Dept: FAMILY MEDICINE CLINIC | Age: 77
End: 2022-10-20
Payer: MEDICARE

## 2022-10-20 VITALS
DIASTOLIC BLOOD PRESSURE: 79 MMHG | HEIGHT: 72 IN | HEART RATE: 76 BPM | TEMPERATURE: 98 F | OXYGEN SATURATION: 96 % | WEIGHT: 195 LBS | SYSTOLIC BLOOD PRESSURE: 136 MMHG | RESPIRATION RATE: 12 BRPM | BODY MASS INDEX: 26.41 KG/M2

## 2022-10-20 DIAGNOSIS — N18.32 STAGE 3B CHRONIC KIDNEY DISEASE (HCC): ICD-10-CM

## 2022-10-20 DIAGNOSIS — I10 ESSENTIAL HYPERTENSION: ICD-10-CM

## 2022-10-20 DIAGNOSIS — E11.21 TYPE 2 DIABETES WITH NEPHROPATHY (HCC): Primary | ICD-10-CM

## 2022-10-20 DIAGNOSIS — B37.2 MONILIASIS, CUTANEOUS: ICD-10-CM

## 2022-10-20 PROBLEM — I48.91 ATRIAL FIBRILLATION (HCC): Status: RESOLVED | Noted: 2018-07-18 | Resolved: 2022-10-20

## 2022-10-20 PROBLEM — N18.30 CHRONIC RENAL DISEASE, STAGE III (HCC): Status: ACTIVE | Noted: 2022-10-20

## 2022-10-20 LAB — GLUCOSE POC: 423 MG/DL

## 2022-10-20 PROCEDURE — 96372 THER/PROPH/DIAG INJ SC/IM: CPT | Performed by: FAMILY MEDICINE

## 2022-10-20 PROCEDURE — 99214 OFFICE O/P EST MOD 30 MIN: CPT | Performed by: FAMILY MEDICINE

## 2022-10-20 PROCEDURE — 82962 GLUCOSE BLOOD TEST: CPT | Performed by: FAMILY MEDICINE

## 2022-10-20 RX ORDER — METOPROLOL SUCCINATE 50 MG/1
50 TABLET, EXTENDED RELEASE ORAL DAILY
Qty: 30 TABLET | Refills: 0 | Status: SHIPPED | OUTPATIENT
Start: 2022-10-20

## 2022-10-20 RX ORDER — GLIPIZIDE 2.5 MG/1
2.5 TABLET, EXTENDED RELEASE ORAL DAILY
Qty: 30 TABLET | Refills: 0 | Status: SHIPPED | OUTPATIENT
Start: 2022-10-20

## 2022-10-20 RX ORDER — CLOTRIMAZOLE AND BETAMETHASONE DIPROPIONATE 10; .64 MG/G; MG/G
CREAM TOPICAL 2 TIMES DAILY
Qty: 15 G | Refills: 1 | Status: SHIPPED | OUTPATIENT
Start: 2022-10-20

## 2022-10-20 NOTE — TELEPHONE ENCOUNTER
Location of patient: [address] pereyra from [place] at [facility] with "[employer]. Subjective: Caller states \"My blood sugar is at 350. I was taken off metformin. Kidney doctor said not to take Metformin anymore and was going to follow up with my PCP, saw my kidney doctor 2 weeks ago. \"     Current Symptoms: ; been in the 300s for the past 3 weeks. Increased thirst, blisters, headache, emesis x2 days ago. Onset: 3 weeks ago; intermittent    Associated Symptoms: reduced activity    Pain Severity: 7/10; aching; intermittent    Temperature: Denies      What has been tried: not sure what to try. LMP: NA Pregnant: NA    Recommended disposition: See in Office Today    Care advice provided, patient verbalizes understanding; denies any other questions or concerns; instructed to call back for any new or worsening symptoms. Patient/Caller agrees with recommended disposition; writer provided warm transfer to [place] at [facility] for appointment scheduling    Attention Provider: Thank you for allowing me to participate in the care of your patient. The patient was connected to triage in response to information provided to the [facility]. Please do not respond through this encounter as the response is not directed to a shared pool.       Reason for Disposition   Symptoms of high blood sugar (e.g., frequent urination, weak, weight loss) and not able to test blood glucose    Protocols used: Diabetes - High Blood Sugar-ADULT-[place]

## 2022-10-20 NOTE — PROGRESS NOTES
Karlo Boateng is a 78 y.o. male who presents to the office today with the following:  Chief Complaint   Patient presents with    Diabetes     BS this am 351. D/C'd metformin recently,       HPI  DM   this am  Felt bad last 3 w  Off Metformin d/t renal failure  Pt here to discuss starting another medication  Last HbA1C 9.9 a mo ago      Seen Dr Fernanda Tucker Nephrologist recently  Metoprolol was increased to 50 mg and BP improved  Will have F/U and US soon    Blister on bottom  Started 2 w ago  Burning   Washing it with warm water and neosporin      Review of Systems   Eyes:  Negative for blurred vision. Cardiovascular:  Negative for chest pain. Gastrointestinal:  Positive for nausea. Genitourinary:  Positive for frequency. Skin:  Positive for rash. Endo/Heme/Allergies:  Negative for polydipsia. See HPI. Past Medical History:   Diagnosis Date    CAD (coronary artery disease), native coronary artery     MI with RCA stent 2011. NSTEMI with L circumflex stent 2021    Diabetes ([address]     Hernia of abdominal wall 2012    Hypercholesterolemia     Hypertension     MI, old 2011       Past Surgical History:   Procedure Laterality Date    HX CORONARY STENT PLACEMENT  2011 [address] HERNIA REPAIR  2012    HX PTCA  09/2021    SANTO L Circumflex artery Dr. Simon Robledo   Allergen Reactions    Other Plant, Animal, Environmental Anaphylaxis     Bee sting, swelling, throat swells, can't breathe    Atorvastatin Calcium Myalgia    Crestor [Rosuvastatin] Myalgia    Zocor [Simvastatin] Myalgia       Current Outpatient Medications   Medication Sig    empagliflozin (Jardiance) 25 mg tablet Take 1 Tablet by mouth daily. metoprolol succinate (TOPROL-XL) 50 mg XL tablet Take 1 Tablet by mouth daily. clotrimazole-betamethasone (LOTRISONE) topical cream Apply  to affected area two (2) times a day. glipiZIDE SR (GLUCOTROL XL) 2.5 mg CR tablet Take 1 Tablet by mouth daily.     lovastatin (MEVACOR) 40 mg tablet Take 1 Tablet by mouth nightly. losartan (COZAAR) 100 mg tablet Take 1 Tablet by mouth daily. amLODIPine (NORVASC) 10 mg tablet Take 1 Tablet by mouth daily. glucose blood VI test strips (Accu-Chek Ana Plus test strp) strip Check Glucose once a day for DM E11.9    lancets misc Check Glucose once a day for DM E11.9    aspirin delayed-release 81 mg tablet 81 mg. Blood-Glucose Meter monitoring kit Check Glucose once a day in am fasting and occ 2 h after a meal for DM E11.9    isosorbide mononitrate ER (IMDUR) 30 mg tablet Take 30 mg by mouth daily. clopidogreL (PLAVIX) 75 mg tab Take 1 Tablet by mouth daily. cyclobenzaprine (FLEXERIL) 5 mg tablet Take 1 Tablet by mouth nightly. As needed for back spasm    nitroglycerin (NITROSTAT) 0.4 mg SL tablet 1 Tab by SubLINGual route every five (5) minutes as needed for Chest Pain. Indications: pt doesn't have any     No current facility-administered medications for this visit. Social History     Socioeconomic History    Marital status:    Tobacco Use    Smoking status: Former     Packs/day: 3.00     Years: 20.00     Pack years: 60.00     Types: Cigarettes     Quit date: 1985     Years since quittin.2    Smokeless tobacco: Never   Substance and Sexual Activity    Alcohol use: No    Drug use: No    Sexual activity: Yes     Partners: Female       Family History   Problem Relation Age of Onset    No Known Problems Mother     Heart Disease Father     Cancer Brother          Physical Exam:  Visit Vitals  /79 (BP 1 Location: Right upper arm, BP Patient Position: Sitting, BP Cuff Size: Large adult)   Pulse 76   Temp 98 °F (36.7 °C)   Resp 12   Ht 6' (1.829 m)   Wt 195 lb (88.5 kg)   SpO2 96%   BMI 26.45 kg/m²     Physical Exam  Vitals and nursing note reviewed. HENT:      Head: Normocephalic and atraumatic. Eyes:      Extraocular Movements: Extraocular movements intact.       Conjunctiva/sclera: Conjunctivae normal.   Cardiovascular: Rate and Rhythm: Normal rate and regular rhythm. Heart sounds: Normal heart sounds. Pulmonary:      Effort: Pulmonary effort is normal.      Breath sounds: Normal breath sounds. Musculoskeletal:      Right lower leg: No edema. Left lower leg: No edema. Skin:     General: Skin is warm. Findings: Rash present. Comments: In between buttocks red rash with 2 open wounds   Neurological:      Mental Status: He is oriented to person, place, and time. Recent Results (from the past 12 hour(s))   AMB POC GLUCOSE BLOOD, BY GLUCOSE MONITORING DEVICE    Collection Time: 10/20/22  6:23 PM   Result Value Ref Range    Glucose  MG/DL       Assessment/Plan:    ICD-10-CM ICD-9-CM    1. Type 2 diabetes with nephropathy (HCC)  E11.21 250.40 empagliflozin (Jardiance) 25 mg tablet     583.81 AMB POC GLUCOSE BLOOD, BY GLUCOSE MONITORING DEVICE      insulin regular (NOVOLIN R, HUMULIN R) injection      2. Essential hypertension  I10 401.9 metoprolol succinate (TOPROL-XL) 50 mg XL tablet      3. Moniliasis, cutaneous  B37.2 112.3 clotrimazole-betamethasone (LOTRISONE) topical cream      4. Stage 3b chronic kidney disease ([facility] [address]  N18.32 585. 3         10 U of Novolin R given in the office, monitor for low Glucose  Start other meds in am, Jardiance and Glucotrol   F/U in 1 w with diary, sooner if still high or low Glucose      Lindsey Cash MD

## 2022-11-16 DIAGNOSIS — E11.21 TYPE 2 DIABETES WITH NEPHROPATHY (HCC): ICD-10-CM

## 2022-11-16 RX ORDER — EMPAGLIFLOZIN 25 MG/1
TABLET, FILM COATED ORAL
Qty: 15 TABLET | Refills: 0 | Status: SHIPPED | OUTPATIENT
Start: 2022-11-16 | End: 2022-11-29 | Stop reason: SDUPTHER

## 2022-11-16 RX ORDER — GLIPIZIDE 2.5 MG/1
TABLET, EXTENDED RELEASE ORAL
Qty: 15 TABLET | Refills: 0 | Status: SHIPPED | OUTPATIENT
Start: 2022-11-16 | End: 2022-11-29 | Stop reason: SDUPTHER

## 2022-11-29 ENCOUNTER — OFFICE VISIT (OUTPATIENT)
Dept: FAMILY MEDICINE CLINIC | Age: 77
End: 2022-11-29
Payer: MEDICARE

## 2022-11-29 VITALS
OXYGEN SATURATION: 98 % | DIASTOLIC BLOOD PRESSURE: 80 MMHG | BODY MASS INDEX: 26.01 KG/M2 | SYSTOLIC BLOOD PRESSURE: 131 MMHG | RESPIRATION RATE: 18 BRPM | HEART RATE: 64 BPM | WEIGHT: 192 LBS | TEMPERATURE: 97 F | HEIGHT: 72 IN

## 2022-11-29 DIAGNOSIS — E11.21 TYPE 2 DIABETES WITH NEPHROPATHY (HCC): ICD-10-CM

## 2022-11-29 DIAGNOSIS — N18.32 STAGE 3B CHRONIC KIDNEY DISEASE (HCC): ICD-10-CM

## 2022-11-29 DIAGNOSIS — I10 ESSENTIAL HYPERTENSION: Primary | ICD-10-CM

## 2022-11-29 DIAGNOSIS — B37.2 MONILIASIS, CUTANEOUS: ICD-10-CM

## 2022-11-29 PROCEDURE — 1123F ACP DISCUSS/DSCN MKR DOCD: CPT | Performed by: FAMILY MEDICINE

## 2022-11-29 PROCEDURE — 99214 OFFICE O/P EST MOD 30 MIN: CPT | Performed by: FAMILY MEDICINE

## 2022-11-29 PROCEDURE — G8427 DOCREV CUR MEDS BY ELIG CLIN: HCPCS | Performed by: FAMILY MEDICINE

## 2022-11-29 PROCEDURE — 3078F DIAST BP <80 MM HG: CPT | Performed by: FAMILY MEDICINE

## 2022-11-29 PROCEDURE — 1101F PT FALLS ASSESS-DOCD LE1/YR: CPT | Performed by: FAMILY MEDICINE

## 2022-11-29 PROCEDURE — G8536 NO DOC ELDER MAL SCRN: HCPCS | Performed by: FAMILY MEDICINE

## 2022-11-29 PROCEDURE — G8510 SCR DEP NEG, NO PLAN REQD: HCPCS | Performed by: FAMILY MEDICINE

## 2022-11-29 PROCEDURE — 2022F DILAT RTA XM EVC RTNOPTHY: CPT | Performed by: FAMILY MEDICINE

## 2022-11-29 PROCEDURE — 3074F SYST BP LT 130 MM HG: CPT | Performed by: FAMILY MEDICINE

## 2022-11-29 PROCEDURE — 3046F HEMOGLOBIN A1C LEVEL >9.0%: CPT | Performed by: FAMILY MEDICINE

## 2022-11-29 PROCEDURE — G8754 DIAS BP LESS 90: HCPCS | Performed by: FAMILY MEDICINE

## 2022-11-29 PROCEDURE — G8417 CALC BMI ABV UP PARAM F/U: HCPCS | Performed by: FAMILY MEDICINE

## 2022-11-29 PROCEDURE — 3017F COLORECTAL CA SCREEN DOC REV: CPT | Performed by: FAMILY MEDICINE

## 2022-11-29 PROCEDURE — G8752 SYS BP LESS 140: HCPCS | Performed by: FAMILY MEDICINE

## 2022-11-29 RX ORDER — LOVASTATIN 40 MG/1
40 TABLET ORAL
Qty: 90 TABLET | Refills: 0 | Status: SHIPPED | OUTPATIENT
Start: 2022-11-29

## 2022-11-29 RX ORDER — LISINOPRIL 2.5 MG/1
TABLET ORAL DAILY
COMMUNITY
End: 2022-11-29 | Stop reason: SDUPTHER

## 2022-11-29 RX ORDER — CLOTRIMAZOLE AND BETAMETHASONE DIPROPIONATE 10; .64 MG/G; MG/G
CREAM TOPICAL 2 TIMES DAILY
Qty: 15 G | Refills: 1 | Status: SHIPPED | OUTPATIENT
Start: 2022-11-29

## 2022-11-29 RX ORDER — LISINOPRIL 2.5 MG/1
2.5 TABLET ORAL DAILY
Qty: 90 TABLET | Refills: 0 | Status: SHIPPED | OUTPATIENT
Start: 2022-11-29

## 2022-11-29 RX ORDER — METOPROLOL SUCCINATE 50 MG/1
50 TABLET, EXTENDED RELEASE ORAL DAILY
Qty: 90 TABLET | Refills: 0 | Status: SHIPPED | OUTPATIENT
Start: 2022-11-29

## 2022-11-29 RX ORDER — GLIPIZIDE 2.5 MG/1
2.5 TABLET, EXTENDED RELEASE ORAL DAILY
Qty: 90 TABLET | Refills: 0 | Status: SHIPPED | OUTPATIENT
Start: 2022-11-29

## 2022-11-29 RX ORDER — AMLODIPINE BESYLATE 10 MG/1
10 TABLET ORAL DAILY
Qty: 90 TABLET | Refills: 0 | Status: SHIPPED | OUTPATIENT
Start: 2022-11-29

## 2022-11-29 NOTE — PROGRESS NOTES
Angel Light is a 78 y.o. 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 Past Medical History:   Diagnosis Date    CAD (coronary artery disease), native coronary artery     MI with RCA stent 2011. NSTEMI with L circumflex stent 2021    Diabetes ([address]     Hernia of abdominal wall 2012    Hypercholesterolemia     Hypertension     MI, old 2011       Past Surgical History:   Procedure Laterality Date    HX CORONARY STENT PLACEMENT  2011 [address] HERNIA REPAIR  2012    HX PTCA  09/2021    SANTO L Circumflex artery Dr. Cameron Sees   Allergen Reactions    Other Plant, Animal, Environmental Anaphylaxis     Bee sting, swelling, throat swells, can't breathe    Atorvastatin Calcium Myalgia    Crestor [Rosuvastatin] Myalgia    Zocor [Simvastatin] Myalgia       Current Outpatient Medications   Medication Sig    metoprolol succinate (TOPROL-XL) 50 mg XL tablet Take 1 Tablet by mouth daily. amLODIPine (NORVASC) 10 mg tablet Take 1 Tablet by mouth daily. lisinopriL (PRINIVIL, ZESTRIL) 2.5 mg tablet Take 1 Tablet by mouth daily. lovastatin (MEVACOR) 40 mg tablet Take 1 Tablet by mouth nightly. empagliflozin (Jardiance) 25 mg tablet Take 1 Tablet by mouth daily.     glipiZIDE SR (GLUCOTROL XL) 2.5 mg CR tablet Take 1 Tablet by mouth daily. clotrimazole-betamethasone (LOTRISONE) topical cream Apply  to affected area two (2) times a day. clopidogreL (PLAVIX) 75 mg tab Take 1 Tablet by mouth daily. glucose blood VI test strips (Accu-Chek Ana Plus test strp) strip Check Glucose once a day for DM E11.9    lancets misc Check Glucose once a day for DM E11.9    aspirin delayed-release 81 mg tablet 81 mg.    nitroglycerin (NITROSTAT) 0.4 mg SL tablet 1 Tab by SubLINGual route every five (5) minutes as needed for Chest Pain. Indications: pt doesn't have any    Blood-Glucose Meter monitoring kit Check Glucose once a day in am fasting and occ 2 h after a meal for DM E11.9     No current facility-administered medications for this visit. Social History     Socioeconomic History    Marital status:    Tobacco Use    Smoking status: Former     Packs/day: 3.00     Years: 20.00     Pack years: 60.00     Types: Cigarettes     Quit date: 1985     Years since quittin.3    Smokeless tobacco: Never   Substance and Sexual Activity    Alcohol use: No    Drug use: No    Sexual activity: Yes     Partners: Female       Family History   Problem Relation Age of Onset    No Known Problems Mother     Heart Disease Father     Cancer Brother          Physical Exam:  Visit Vitals  /80 (BP 1 Location: Left upper arm, BP Patient Position: Sitting, BP Cuff Size: Adult)   Pulse 64   Temp 97 °F (36.1 °C) (Temporal)   Resp 18   Ht 6' (1.829 m)   Wt 192 lb (87.1 kg)   SpO2 98%   BMI 26.04 kg/m²     Physical Exam  Vitals and nursing note reviewed. HENT:      Head: Normocephalic and atraumatic. Eyes:      Extraocular Movements: Extraocular movements intact. Conjunctiva/sclera: Conjunctivae normal.   Cardiovascular:      Rate and Rhythm: Normal rate and regular rhythm. Heart sounds: Normal heart sounds. Pulmonary:      Breath sounds: Normal breath sounds. Abdominal:      General: There is no distension. Palpations: Abdomen is soft. Tenderness: There is no abdominal tenderness. There is no right CVA tenderness, left CVA tenderness or guarding. Musculoskeletal:      Right lower leg: No edema. Left lower leg: No edema. Skin:     General: Skin is warm and dry. Neurological:      Mental Status: He is alert and oriented to person, place, and time. Psychiatric:         Mood and Affect: Mood normal.         Behavior: Behavior normal.       Assessment/Plan:    ICD-10-CM ICD-9-CM    1. Essential hypertension  I10 401.9 metoprolol succinate (TOPROL-XL) 50 mg XL tablet      amLODIPine (NORVASC) 10 mg tablet      lisinopriL (PRINIVIL, ZESTRIL) 2.5 mg tablet      2. Stage 3b chronic kidney disease (HCC)  N18.32 585.3       3. Type 2 diabetes with nephropathy (HCC)  E11.21 250.40 empagliflozin (Jardiance) 25 mg tablet     583.81 glipiZIDE SR (GLUCOTROL XL) 2.5 mg CR tablet      4.  Moniliasis, cutaneous  B37.2 112.3 clotrimazole-betamethasone (LOTRISONE) topical cream        Cont to watch diet and monitor Glucose and recheck in 1 mo for HbA1C      Jian Mejia MD

## 2022-11-29 NOTE — PROGRESS NOTES
1. \"Have you been to the ER, urgent care clinic since your last visit? Hospitalized since your last visit? \" No    2. \"Have you seen or consulted any other health care providers outside of the [address] [place] [address] since your last visit? \" No     3. For patients aged 39-70: Has the patient had a colonoscopy / FIT/ Cologuard? No      If the patient is female:    4. For patients aged 41-77: Has the patient had a mammogram within the past 2 years? NA - based on age or sex      11. For patients aged 21-65: Has the patient had a pap smear?  NA - based on age or sex

## 2022-12-29 ENCOUNTER — OFFICE VISIT (OUTPATIENT)
Dept: FAMILY MEDICINE CLINIC | Age: 77
End: 2022-12-29
Payer: MEDICARE

## 2022-12-29 VITALS
RESPIRATION RATE: 16 BRPM | DIASTOLIC BLOOD PRESSURE: 88 MMHG | HEIGHT: 72 IN | OXYGEN SATURATION: 97 % | WEIGHT: 188.8 LBS | TEMPERATURE: 97.7 F | BODY MASS INDEX: 25.57 KG/M2 | HEART RATE: 72 BPM | SYSTOLIC BLOOD PRESSURE: 142 MMHG

## 2022-12-29 DIAGNOSIS — L89.301 PRESSURE INJURY OF BUTTOCK, STAGE 1, UNSPECIFIED LATERALITY: ICD-10-CM

## 2022-12-29 DIAGNOSIS — N18.32 STAGE 3B CHRONIC KIDNEY DISEASE (HCC): ICD-10-CM

## 2022-12-29 DIAGNOSIS — E11.21 TYPE 2 DIABETES WITH NEPHROPATHY (HCC): Primary | ICD-10-CM

## 2022-12-29 DIAGNOSIS — I10 ESSENTIAL HYPERTENSION: ICD-10-CM

## 2022-12-29 DIAGNOSIS — E78.2 MIXED HYPERLIPIDEMIA: ICD-10-CM

## 2022-12-29 LAB — HBA1C MFR BLD HPLC: 7.7 %

## 2022-12-29 PROCEDURE — 3017F COLORECTAL CA SCREEN DOC REV: CPT | Performed by: FAMILY MEDICINE

## 2022-12-29 PROCEDURE — 1101F PT FALLS ASSESS-DOCD LE1/YR: CPT | Performed by: FAMILY MEDICINE

## 2022-12-29 PROCEDURE — 3074F SYST BP LT 130 MM HG: CPT | Performed by: FAMILY MEDICINE

## 2022-12-29 PROCEDURE — 1123F ACP DISCUSS/DSCN MKR DOCD: CPT | Performed by: FAMILY MEDICINE

## 2022-12-29 PROCEDURE — 2022F DILAT RTA XM EVC RTNOPTHY: CPT | Performed by: FAMILY MEDICINE

## 2022-12-29 PROCEDURE — 3051F HG A1C>EQUAL 7.0%<8.0%: CPT | Performed by: FAMILY MEDICINE

## 2022-12-29 PROCEDURE — 3078F DIAST BP <80 MM HG: CPT | Performed by: FAMILY MEDICINE

## 2022-12-29 PROCEDURE — G8536 NO DOC ELDER MAL SCRN: HCPCS | Performed by: FAMILY MEDICINE

## 2022-12-29 PROCEDURE — 83036 HEMOGLOBIN GLYCOSYLATED A1C: CPT | Performed by: FAMILY MEDICINE

## 2022-12-29 PROCEDURE — G8510 SCR DEP NEG, NO PLAN REQD: HCPCS | Performed by: FAMILY MEDICINE

## 2022-12-29 PROCEDURE — G8427 DOCREV CUR MEDS BY ELIG CLIN: HCPCS | Performed by: FAMILY MEDICINE

## 2022-12-29 PROCEDURE — G8417 CALC BMI ABV UP PARAM F/U: HCPCS | Performed by: FAMILY MEDICINE

## 2022-12-29 PROCEDURE — 99214 OFFICE O/P EST MOD 30 MIN: CPT | Performed by: FAMILY MEDICINE

## 2022-12-29 RX ORDER — GLIPIZIDE 2.5 MG/1
5 TABLET, EXTENDED RELEASE ORAL DAILY
Qty: 180 TABLET | Refills: 0 | Status: SHIPPED | OUTPATIENT
Start: 2022-12-29

## 2022-12-29 NOTE — PROGRESS NOTES
Javon Tejada is a 78 y.o. male who presents to the office today with the following:  Chief Complaint   Patient presents with    Follow-up     1 month follow up       HPI  DM  Lab Results   Component Value Date/Time    Hemoglobin A1c 9.9 (H) 09/26/2022 09:17 AM    Hemoglobin A1c 8.2 09/14/2021 12:00 AM    Hemoglobin A1c 9.1 (H) 07/02/2021 05:07 PM    Hemoglobin A1c 7.7 (H) 08/06/2020 07:41 AM   BS checks at home 112-135 in am      HTN  Ran out of BP meds a week ago, Amlodipine    Hyperlipidemia  Last Chol high  Lovastatin increased to 40 mg  Due for recheck    Renal failure  Seeing Nephrologist, Dr Lawyer Benedict  Will have MRI for a mass    Lesion on bottom not healing  Using another cream  in addition and helping  Similar to Zn oxide cream      Review of Systems   HENT:  Negative for congestion. Respiratory:  Negative for cough and shortness of breath. Cardiovascular:  Negative for chest pain. Neurological:  Negative for dizziness and headaches. See HPI. Past Medical History:   Diagnosis Date    CAD (coronary artery disease), native coronary artery     MI with RCA stent 2011. NSTEMI with L circumflex stent 2021    Diabetes ([facility] [address]     Hernia of abdominal wall 2012    Hypercholesterolemia     Hypertension     MI, old 2011       Past Surgical History:   Procedure Laterality Date    HX CORONARY STENT PLACEMENT  2011 [address] HERNIA REPAIR  2012    HX PTCA  09/2021    SANTO L Circumflex artery Dr. Baptiste   Allergen Reactions    Other Plant, Animal, Environmental Anaphylaxis     Bee sting, swelling, throat swells, can't breathe    Atorvastatin Calcium Myalgia    Crestor [Rosuvastatin] Myalgia    Zocor [Simvastatin] Myalgia       Current Outpatient Medications   Medication Sig    glipiZIDE SR (GLUCOTROL XL) 2.5 mg CR tablet Take 2 Tablets by mouth daily. metoprolol succinate (TOPROL-XL) 50 mg XL tablet Take 1 Tablet by mouth daily. amLODIPine (NORVASC) 10 mg tablet Take 1 Tablet by mouth daily. lisinopriL (PRINIVIL, ZESTRIL) 2.5 mg tablet Take 1 Tablet by mouth daily. lovastatin (MEVACOR) 40 mg tablet Take 1 Tablet by mouth nightly. empagliflozin (Jardiance) 25 mg tablet Take 1 Tablet by mouth daily. clotrimazole-betamethasone (LOTRISONE) topical cream Apply  to affected area two (2) times a day. clopidogreL (PLAVIX) 75 mg tab Take 1 Tablet by mouth daily. glucose blood VI test strips (Accu-Chek Ana Plus test strp) strip Check Glucose once a day for DM E11.9    lancets misc Check Glucose once a day for DM E11.9    aspirin delayed-release 81 mg tablet 81 mg.    nitroglycerin (NITROSTAT) 0.4 mg SL tablet 1 Tab by SubLINGual route every five (5) minutes as needed for Chest Pain. Indications: pt doesn't have any    Blood-Glucose Meter monitoring kit Check Glucose once a day in am fasting and occ 2 h after a meal for DM E11.9     No current facility-administered medications for this visit. Social History     Socioeconomic History    Marital status:    Tobacco Use    Smoking status: Former     Packs/day: 3.00     Years: 20.00     Pack years: 60.00     Types: Cigarettes     Quit date: 1985     Years since quittin.4    Smokeless tobacco: Never   Substance and Sexual Activity    Alcohol use: No    Drug use: No    Sexual activity: Yes     Partners: Female       Family History   Problem Relation Age of Onset    No Known Problems Mother     Heart Disease Father     Cancer Brother          Physical Exam:  Visit Vitals  BP (!) 142/88   Pulse 72   Temp 97.7 °F (36.5 °C) (Temporal)   Resp 16   Ht 6' (1.829 m)   Wt 188 lb 12.8 oz (85.6 kg)   SpO2 97%   BMI 25.61 kg/m²     Physical Exam  Vitals and nursing note reviewed. HENT:      Head: Normocephalic and atraumatic. Eyes:      Extraocular Movements: Extraocular movements intact. Conjunctiva/sclera: Conjunctivae normal.   Cardiovascular:      Rate and Rhythm: Normal rate and regular rhythm.       Heart sounds: Normal heart sounds. Pulmonary:      Effort: Pulmonary effort is normal.      Breath sounds: Normal breath sounds. Musculoskeletal:      Right lower leg: No edema. Left lower leg: No edema. Skin:     General: Skin is warm and dry. Comments: Buttocks with decubitus bilat, superficial, stage 1   Neurological:      Mental Status: He is alert and oriented to person, place, and time. Psychiatric:         Mood and Affect: Mood normal.         Behavior: Behavior normal.       Recent Results (from the past 12 hour(s))   AMB POC HEMOGLOBIN A1C    Collection Time: 12/29/22  2:21 PM   Result Value Ref Range    Hemoglobin A1c (POC) 7.7 %       Assessment/Plan:    ICD-10-CM ICD-9-CM    1. Type 2 diabetes with nephropathy (HCC)  E11.21 250.40 AMB POC HEMOGLOBIN A1C     583.81 glipiZIDE SR (GLUCOTROL XL) 2.5 mg CR tablet      2. Essential hypertension  S28 781.7 METABOLIC PANEL, COMPREHENSIVE      METABOLIC PANEL, COMPREHENSIVE      3. Mixed hyperlipidemia  E78.2 272.2 LIPID PANEL      METABOLIC PANEL, COMPREHENSIVE      METABOLIC PANEL, COMPREHENSIVE      LIPID PANEL      4. Pressure injury of buttock, stage 1, unspecified laterality  L89.301 707.05      707.21       5. Stage 3b chronic kidney disease (HCC)  N18.32 585.3         Glipizide increased to 5 mg  Cont to monitor Glucose  Cont current creams on buttocks  F/U with Nephrologist    Recheck in 3 mo    Follow-up and Dispositions    Return in about 3 months (around 3/29/2023).          Morgan Avalos MD

## 2022-12-29 NOTE — PROGRESS NOTES
1. \"Have you been to the ER, urgent care clinic since your last visit? Hospitalized since your last visit? \" No    2. \"Have you seen or consulted any other health care providers outside of the [address] [place] [address] since your last visit? \" No     3. For patients aged 39-70: Has the patient had a colonoscopy / FIT/ Cologuard? No      If the patient is female:    4. For patients aged 41-77: Has the patient had a mammogram within the past 2 years? NA - based on age or sex      11. For patients aged 21-65: Has the patient had a pap smear?  NA - based on age or sex

## 2022-12-30 LAB
ALBUMIN SERPL-MCNC: 3.8 G/DL (ref 3.5–5)
ALBUMIN/GLOB SERPL: 1 {RATIO} (ref 1.1–2.2)
ALP SERPL-CCNC: 101 U/L (ref 45–117)
ALT SERPL-CCNC: 20 U/L (ref 12–78)
ANION GAP SERPL CALC-SCNC: 2 MMOL/L (ref 5–15)
AST SERPL-CCNC: ABNORMAL U/L (ref 15–37)
BILIRUB SERPL-MCNC: 1.9 MG/DL (ref 0.2–1)
BUN SERPL-MCNC: 29 MG/DL (ref 6–20)
BUN/CREAT SERPL: 14 (ref 12–20)
CALCIUM SERPL-MCNC: 10 MG/DL (ref 8.5–10.1)
CHLORIDE SERPL-SCNC: 109 MMOL/L (ref 97–108)
CHOLEST SERPL-MCNC: 175 MG/DL
CO2 SERPL-SCNC: 26 MMOL/L (ref 21–32)
CREAT SERPL-MCNC: 2.02 MG/DL (ref 0.7–1.3)
GLOBULIN SER CALC-MCNC: 3.9 G/DL (ref 2–4)
GLUCOSE SERPL-MCNC: 214 MG/DL (ref 65–100)
HDLC SERPL-MCNC: 34 MG/DL
HDLC SERPL: 5.1 {RATIO} (ref 0–5)
LDLC SERPL CALC-MCNC: 103.6 MG/DL (ref 0–100)
POTASSIUM SERPL-SCNC: ABNORMAL MMOL/L (ref 3.5–5.1)
PROT SERPL-MCNC: 7.7 G/DL (ref 6.4–8.2)
SODIUM SERPL-SCNC: 137 MMOL/L (ref 136–145)
TRIGL SERPL-MCNC: 187 MG/DL (ref ?–150)
VLDLC SERPL CALC-MCNC: 37.4 MG/DL

## 2022-12-30 NOTE — PROGRESS NOTES
Call pt, the electrolytes are ok, except the Potassium and one liver test could not be done, we will redo those in 3 mo  The kidney tests are still up, but stable, F/U with the Nephrologist  The Chol is much better, cont current dose  Recheck in 3 mo

## 2022-12-30 NOTE — PROGRESS NOTES
I have called and talked to this patient. I have verified the patient's name and . I have discussed the lab results and recommendations from Dr Sean Curiel. Patient verbalizes understanding at this time.

## 2023-03-27 ENCOUNTER — OFFICE VISIT (OUTPATIENT)
Dept: FAMILY MEDICINE CLINIC | Age: 78
End: 2023-03-27
Payer: MEDICARE

## 2023-03-27 VITALS
RESPIRATION RATE: 12 BRPM | DIASTOLIC BLOOD PRESSURE: 79 MMHG | BODY MASS INDEX: 26.55 KG/M2 | WEIGHT: 196 LBS | HEIGHT: 72 IN | OXYGEN SATURATION: 97 % | SYSTOLIC BLOOD PRESSURE: 159 MMHG | TEMPERATURE: 97.8 F

## 2023-03-27 DIAGNOSIS — I25.10 CORONARY ARTERY DISEASE INVOLVING NATIVE HEART WITHOUT ANGINA PECTORIS, UNSPECIFIED VESSEL OR LESION TYPE: ICD-10-CM

## 2023-03-27 DIAGNOSIS — E11.21 TYPE 2 DIABETES WITH NEPHROPATHY (HCC): ICD-10-CM

## 2023-03-27 DIAGNOSIS — N18.32 STAGE 3B CHRONIC KIDNEY DISEASE (HCC): ICD-10-CM

## 2023-03-27 DIAGNOSIS — I10 ESSENTIAL HYPERTENSION: ICD-10-CM

## 2023-03-27 PROCEDURE — 99214 OFFICE O/P EST MOD 30 MIN: CPT | Performed by: FAMILY MEDICINE

## 2023-03-27 PROCEDURE — G8417 CALC BMI ABV UP PARAM F/U: HCPCS | Performed by: FAMILY MEDICINE

## 2023-03-27 PROCEDURE — G8536 NO DOC ELDER MAL SCRN: HCPCS | Performed by: FAMILY MEDICINE

## 2023-03-27 PROCEDURE — 3078F DIAST BP <80 MM HG: CPT | Performed by: FAMILY MEDICINE

## 2023-03-27 PROCEDURE — 1101F PT FALLS ASSESS-DOCD LE1/YR: CPT | Performed by: FAMILY MEDICINE

## 2023-03-27 PROCEDURE — 1123F ACP DISCUSS/DSCN MKR DOCD: CPT | Performed by: FAMILY MEDICINE

## 2023-03-27 PROCEDURE — G8427 DOCREV CUR MEDS BY ELIG CLIN: HCPCS | Performed by: FAMILY MEDICINE

## 2023-03-27 PROCEDURE — G8510 SCR DEP NEG, NO PLAN REQD: HCPCS | Performed by: FAMILY MEDICINE

## 2023-03-27 PROCEDURE — 3077F SYST BP >= 140 MM HG: CPT | Performed by: FAMILY MEDICINE

## 2023-03-27 RX ORDER — LOVASTATIN 40 MG/1
40 TABLET ORAL
Qty: 90 TABLET | Refills: 1 | Status: SHIPPED | OUTPATIENT
Start: 2023-03-27

## 2023-03-27 RX ORDER — AMLODIPINE BESYLATE 10 MG/1
10 TABLET ORAL DAILY
Qty: 90 TABLET | Refills: 1 | Status: SHIPPED | OUTPATIENT
Start: 2023-03-27

## 2023-03-27 RX ORDER — LISINOPRIL 2.5 MG/1
2.5 TABLET ORAL DAILY
Qty: 30 TABLET | Refills: 0 | Status: SHIPPED | OUTPATIENT
Start: 2023-03-27

## 2023-03-27 RX ORDER — BLOOD SUGAR DIAGNOSTIC
STRIP MISCELLANEOUS
Qty: 100 STRIP | Refills: 3 | Status: SHIPPED | OUTPATIENT
Start: 2023-03-27

## 2023-03-27 RX ORDER — CLOPIDOGREL BISULFATE 75 MG/1
75 TABLET ORAL DAILY
Qty: 90 TABLET | Refills: 1 | Status: SHIPPED | OUTPATIENT
Start: 2023-03-27

## 2023-03-27 RX ORDER — METOPROLOL SUCCINATE 50 MG/1
50 TABLET, EXTENDED RELEASE ORAL DAILY
Qty: 90 TABLET | Refills: 1 | Status: SHIPPED | OUTPATIENT
Start: 2023-03-27

## 2023-03-27 NOTE — PROGRESS NOTES
Vesta Sargent is a 70 y.o. male who presents to the office today with the following:  Chief Complaint   Patient presents with    Diabetes     Follow up. Out of all meds x 1 week       HPI  DM  Lab Results   Component Value Date/Time    Hemoglobin A1c 9.9 (H) 09/26/2022 09:17 AM    Hemoglobin A1c 8.2 09/14/2021 12:00 AM    Hemoglobin A1c 9.1 (H) 07/02/2021 05:07 PM    Hemoglobin A1c 7.7 (H) 08/06/2020 07:41 AM     Last HbA1C 7.7 12/29  On Jardience and Glipizide  Glucose     Has been given Lisinopril 2.5 mg for BP by Dr Batsheva Camarena but ran out of the meds for a week  BP high today  Had run out of other BP meds too about a week ago        Review of Systems   HENT:  Negative for congestion. Eyes:  Negative for blurred vision. Respiratory:  Negative for cough and shortness of breath. Cardiovascular:  Negative for chest pain and leg swelling. Gastrointestinal:  Negative for abdominal pain. Genitourinary:  Negative for frequency. Neurological:  Negative for dizziness and headaches. Endo/Heme/Allergies:  Negative for polydipsia. See HPI. Past Medical History:   Diagnosis Date    CAD (coronary artery disease), native coronary artery     MI with RCA stent 2011. NSTEMI with L circumflex stent 2021    Diabetes ([address]     Hernia of abdominal wall 2012    Hypercholesterolemia     Hypertension     MI, old 2011       Past Surgical History:   Procedure Laterality Date    HX CORONARY STENT PLACEMENT  2011 [address] HERNIA REPAIR  2012    HX PTCA  09/2021    SANTO L Circumflex artery Dr. Rhodes Score   Allergen Reactions    Other Plant, Animal, Environmental Anaphylaxis     Bee sting, swelling, throat swells, can't breathe    Atorvastatin Calcium Myalgia    Crestor [Rosuvastatin] Myalgia    Zocor [Simvastatin] Myalgia       Current Outpatient Medications   Medication Sig    metoprolol succinate (TOPROL-XL) 50 mg XL tablet Take 1 Tablet by mouth daily.     amLODIPine (NORVASC) 10 mg tablet Take 1 Tablet by mouth daily. lisinopriL (PRINIVIL, ZESTRIL) 2.5 mg tablet Take 1 Tablet by mouth daily. lovastatin (MEVACOR) 40 mg tablet Take 1 Tablet by mouth nightly. empagliflozin (Jardiance) 25 mg tablet Take 1 Tablet by mouth daily. clopidogreL (PLAVIX) 75 mg tab Take 1 Tablet by mouth daily. glucose blood VI test strips (Accu-Chek Ana Plus test strp) strip Check Glucose once a day for DM E11.9    glipiZIDE SR (GLUCOTROL XL) 2.5 mg CR tablet Take 2 Tablets by mouth daily. lancets misc Check Glucose once a day for DM E11.9    aspirin delayed-release 81 mg tablet 81 mg. Blood-Glucose Meter monitoring kit Check Glucose once a day in am fasting and occ 2 h after a meal for DM E11.9    nitroglycerin (NITROSTAT) 0.4 mg SL tablet 1 Tab by SubLINGual route every five (5) minutes as needed for Chest Pain. Indications: pt doesn't have any     No current facility-administered medications for this visit.        Social History     Socioeconomic History    Marital status:    Tobacco Use    Smoking status: Former     Packs/day: 3.00     Years: 20.00     Pack years: 60.00     Types: Cigarettes     Quit date: 1985     Years since quittin.7    Smokeless tobacco: Never   Substance and Sexual Activity    Alcohol use: No    Drug use: No    Sexual activity: Yes     Partners: Female     Social Determinants of Health     Financial Resource Strain: Medium Risk    Difficulty of Paying Living Expenses: Somewhat hard   Food Insecurity: Food Insecurity Present    Worried About Running Out of Food in the Last Year: Sometimes true    Ran Out of Food in the Last Year: Sometimes true   Transportation Needs: No Transportation Needs    Lack of Transportation (Medical): No    Lack of Transportation (Non-Medical): No   Housing Stability: Unknown    Unable to Pay for Housing in the Last Year: No    Unstable Housing in the Last Year: No       Family History   Problem Relation Age of Onset    No Known Problems Mother     Heart Disease Father     Cancer Brother          Physical Exam:  Visit Vitals  BP (!) 159/79 (BP 1 Location: Right upper arm, BP Patient Position: Sitting, BP Cuff Size: Adult)   Temp 97.8 °F (36.6 °C)   Resp 12   Ht 6' (1.829 m)   Wt 196 lb (88.9 kg)   SpO2 97%   BMI 26.58 kg/m²     Physical Exam  Vitals and nursing note reviewed. HENT:      Head: Normocephalic and atraumatic. Right Ear: Tympanic membrane, ear canal and external ear normal.      Left Ear: Tympanic membrane, ear canal and external ear normal.   Eyes:      Extraocular Movements: Extraocular movements intact. Conjunctiva/sclera: Conjunctivae normal.   Cardiovascular:      Rate and Rhythm: Normal rate and regular rhythm. Heart sounds: Normal heart sounds. Pulmonary:      Effort: Pulmonary effort is normal.      Breath sounds: Normal breath sounds. Musculoskeletal:      Right lower leg: No edema. Left lower leg: No edema. Lymphadenopathy:      Cervical: No cervical adenopathy. Skin:     General: Skin is warm and dry. Neurological:      Mental Status: He is alert and oriented to person, place, and time. Assessment/Plan:    ICD-10-CM ICD-9-CM    1. Essential hypertension  I10 401.9 metoprolol succinate (TOPROL-XL) 50 mg XL tablet      amLODIPine (NORVASC) 10 mg tablet      lisinopriL (PRINIVIL, ZESTRIL) 2.5 mg tablet      METABOLIC PANEL, BASIC      2. Type 2 diabetes with nephropathy (HCC)  E11.21 250.40 empagliflozin (Jardiance) 25 mg tablet     583.81 glucose blood VI test strips (Accu-Chek Ana Plus test strp) strip      HEMOGLOBIN A1C WITH EAG      3. Coronary artery disease involving native heart without angina pectoris, unspecified vessel or lesion type  I25.10 414.01 clopidogreL (PLAVIX) 75 mg tab      4.  Stage 3b chronic kidney disease (HCC)  G23.32 656.2 METABOLIC PANEL, BASIC        Recheck BW in 10 d with nurse and   F/U with me in 1 mo for BP recheck since ran out of meds and Lisinopril recently was started    Follow-up and Dispositions    Return in about 4 weeks (around 4/24/2023).          Pato Chan MD

## 2023-03-27 NOTE — PROGRESS NOTES
YES Answers must have Comments  1. \"Have you been to the ER, urgent care clinic since your last visit? Hospitalized since your last visit? \"    [] YES   [x] NO       2. Have you seen or consulted any other health care providers outside of [address] [place] [address] since your last visit?     [] YES   [x] NO       3. For patients aged 39-70: Have you had a colorectal cancer screening such as a colonoscopy/FIT/Cologuard? Nurse/CMA to request records if not in chart   [] YES   [] NO   [x] NA, based on age    If the patient is female:      4. For female patients aged 41-77: Prudy Huddle you had a mammogram in the last two years?  Nurse/CMA to request records if not in chart   [] YES   [] NO   [] NA, based on age    11. For female patients aged 21-65: Prudy Huddle you had a pap smear?   Nurse/CMA to request records if not in chart   [] YES   [] NO  [] NA, based on age

## 2023-10-05 ENCOUNTER — OFFICE VISIT (OUTPATIENT)
Dept: FAMILY MEDICINE CLINIC | Age: 78
End: 2023-10-05
Payer: MEDICARE

## 2023-10-05 VITALS
BODY MASS INDEX: 25.06 KG/M2 | HEART RATE: 69 BPM | HEIGHT: 72 IN | SYSTOLIC BLOOD PRESSURE: 130 MMHG | RESPIRATION RATE: 10 BRPM | OXYGEN SATURATION: 98 % | WEIGHT: 185 LBS | TEMPERATURE: 98.2 F | DIASTOLIC BLOOD PRESSURE: 79 MMHG

## 2023-10-05 DIAGNOSIS — N40.1 BENIGN PROSTATIC HYPERPLASIA WITH URINARY FREQUENCY: ICD-10-CM

## 2023-10-05 DIAGNOSIS — E78.2 MIXED HYPERLIPIDEMIA: ICD-10-CM

## 2023-10-05 DIAGNOSIS — Z11.59 NEED FOR HEPATITIS C SCREENING TEST: ICD-10-CM

## 2023-10-05 DIAGNOSIS — I10 ESSENTIAL (PRIMARY) HYPERTENSION: ICD-10-CM

## 2023-10-05 DIAGNOSIS — Z00.00 MEDICARE ANNUAL WELLNESS VISIT, SUBSEQUENT: Primary | ICD-10-CM

## 2023-10-05 DIAGNOSIS — I25.10 ATHEROSCLEROSIS OF NATIVE CORONARY ARTERY OF NATIVE HEART WITHOUT ANGINA PECTORIS: ICD-10-CM

## 2023-10-05 DIAGNOSIS — R35.0 BENIGN PROSTATIC HYPERPLASIA WITH URINARY FREQUENCY: ICD-10-CM

## 2023-10-05 DIAGNOSIS — R30.0 DYSURIA: ICD-10-CM

## 2023-10-05 DIAGNOSIS — E11.21 TYPE 2 DIABETES MELLITUS WITH DIABETIC NEPHROPATHY, WITHOUT LONG-TERM CURRENT USE OF INSULIN (HCC): ICD-10-CM

## 2023-10-05 LAB
BILIRUBIN, URINE, POC: NEGATIVE
BILIRUBIN, URINE, POC: NEGATIVE
BLOOD URINE, POC: NORMAL
BLOOD URINE, POC: NORMAL
GLUCOSE URINE, POC: NEGATIVE
GLUCOSE URINE, POC: NORMAL
GLUCOSE, POC: 319 MG/DL
KETONES, URINE, POC: NEGATIVE
KETONES, URINE, POC: NEGATIVE
LEUKOCYTE ESTERASE, URINE, POC: NEGATIVE
LEUKOCYTE ESTERASE, URINE, POC: NEGATIVE
NITRITE, URINE, POC: NEGATIVE
NITRITE, URINE, POC: NEGATIVE
PH, URINE, POC: 6 (ref 4.6–8)
PH, URINE, POC: 6 (ref 4.6–8)
PROTEIN,URINE, POC: NEGATIVE
PROTEIN,URINE, POC: NEGATIVE
SPECIFIC GRAVITY, URINE, POC: 1.01 (ref 1–1.03)
SPECIFIC GRAVITY, URINE, POC: 1.01 (ref 1–1.03)
URINALYSIS CLARITY, POC: CLEAR
URINALYSIS CLARITY, POC: CLEAR
URINALYSIS COLOR, POC: YELLOW
URINALYSIS COLOR, POC: YELLOW
UROBILINOGEN, POC: NORMAL
UROBILINOGEN, POC: NORMAL

## 2023-10-05 PROCEDURE — G8484 FLU IMMUNIZE NO ADMIN: HCPCS | Performed by: FAMILY MEDICINE

## 2023-10-05 PROCEDURE — 81001 URINALYSIS AUTO W/SCOPE: CPT | Performed by: FAMILY MEDICINE

## 2023-10-05 PROCEDURE — 3075F SYST BP GE 130 - 139MM HG: CPT | Performed by: FAMILY MEDICINE

## 2023-10-05 PROCEDURE — 82962 GLUCOSE BLOOD TEST: CPT | Performed by: FAMILY MEDICINE

## 2023-10-05 PROCEDURE — G8419 CALC BMI OUT NRM PARAM NOF/U: HCPCS | Performed by: FAMILY MEDICINE

## 2023-10-05 PROCEDURE — 1036F TOBACCO NON-USER: CPT | Performed by: FAMILY MEDICINE

## 2023-10-05 PROCEDURE — G0439 PPPS, SUBSEQ VISIT: HCPCS | Performed by: FAMILY MEDICINE

## 2023-10-05 PROCEDURE — G8427 DOCREV CUR MEDS BY ELIG CLIN: HCPCS | Performed by: FAMILY MEDICINE

## 2023-10-05 PROCEDURE — 99214 OFFICE O/P EST MOD 30 MIN: CPT | Performed by: FAMILY MEDICINE

## 2023-10-05 PROCEDURE — 1123F ACP DISCUSS/DSCN MKR DOCD: CPT | Performed by: FAMILY MEDICINE

## 2023-10-05 PROCEDURE — 81003 URINALYSIS AUTO W/O SCOPE: CPT | Performed by: FAMILY MEDICINE

## 2023-10-05 PROCEDURE — 3078F DIAST BP <80 MM HG: CPT | Performed by: FAMILY MEDICINE

## 2023-10-05 RX ORDER — GLIPIZIDE 2.5 MG/1
2.5 TABLET, EXTENDED RELEASE ORAL DAILY
Qty: 30 TABLET | Refills: 0 | Status: SHIPPED | OUTPATIENT
Start: 2023-10-05 | End: 2023-10-06 | Stop reason: ALTCHOICE

## 2023-10-05 RX ORDER — LISINOPRIL 2.5 MG/1
2.5 TABLET ORAL DAILY
Qty: 90 TABLET | Refills: 1 | Status: SHIPPED | OUTPATIENT
Start: 2023-10-05

## 2023-10-05 RX ORDER — CLOPIDOGREL BISULFATE 75 MG/1
75 TABLET ORAL DAILY
Qty: 90 TABLET | Refills: 1 | Status: SHIPPED | OUTPATIENT
Start: 2023-10-05

## 2023-10-05 RX ORDER — BLOOD-GLUCOSE METER
1 KIT MISCELLANEOUS DAILY
Qty: 1 KIT | Refills: 0 | Status: SHIPPED | OUTPATIENT
Start: 2023-10-05

## 2023-10-05 RX ORDER — METOPROLOL SUCCINATE 50 MG/1
50 TABLET, EXTENDED RELEASE ORAL DAILY
Qty: 90 TABLET | Refills: 1 | Status: SHIPPED | OUTPATIENT
Start: 2023-10-05

## 2023-10-05 RX ORDER — AMLODIPINE BESYLATE 10 MG/1
10 TABLET ORAL DAILY
Qty: 90 TABLET | Refills: 1 | Status: SHIPPED | OUTPATIENT
Start: 2023-10-05

## 2023-10-05 RX ORDER — LOVASTATIN 40 MG/1
40 TABLET ORAL NIGHTLY
Qty: 90 TABLET | Refills: 1 | Status: SHIPPED | OUTPATIENT
Start: 2023-10-05 | End: 2023-10-06

## 2023-10-05 ASSESSMENT — ENCOUNTER SYMPTOMS
NAUSEA: 0
ABDOMINAL PAIN: 0
COUGH: 0
BACK PAIN: 0
VOMITING: 0

## 2023-10-05 ASSESSMENT — LIFESTYLE VARIABLES
HOW MANY STANDARD DRINKS CONTAINING ALCOHOL DO YOU HAVE ON A TYPICAL DAY: PATIENT DOES NOT DRINK
HOW OFTEN DO YOU HAVE A DRINK CONTAINING ALCOHOL: NEVER

## 2023-10-06 ENCOUNTER — TELEPHONE (OUTPATIENT)
Dept: FAMILY MEDICINE CLINIC | Age: 78
End: 2023-10-06

## 2023-10-06 DIAGNOSIS — E11.21 TYPE 2 DIABETES MELLITUS WITH DIABETIC NEPHROPATHY, WITHOUT LONG-TERM CURRENT USE OF INSULIN (HCC): ICD-10-CM

## 2023-10-06 LAB
ALBUMIN SERPL-MCNC: 3.6 G/DL (ref 3.5–5)
ALBUMIN/GLOB SERPL: 1.1 (ref 1.1–2.2)
ALP SERPL-CCNC: 111 U/L (ref 45–117)
ALT SERPL-CCNC: 19 U/L (ref 12–78)
ANION GAP SERPL CALC-SCNC: 7 MMOL/L (ref 5–15)
AST SERPL-CCNC: 6 U/L (ref 15–37)
BACTERIA SPEC CULT: NORMAL
BASOPHILS # BLD: 0.1 K/UL (ref 0–0.1)
BASOPHILS NFR BLD: 1 % (ref 0–1)
BILIRUB SERPL-MCNC: 1.1 MG/DL (ref 0.2–1)
BUN SERPL-MCNC: 18 MG/DL (ref 6–20)
BUN/CREAT SERPL: 10 (ref 12–20)
CALCIUM SERPL-MCNC: 10.2 MG/DL (ref 8.5–10.1)
CHLORIDE SERPL-SCNC: 107 MMOL/L (ref 97–108)
CHOLEST SERPL-MCNC: 245 MG/DL
CO2 SERPL-SCNC: 23 MMOL/L (ref 21–32)
CREAT SERPL-MCNC: 1.72 MG/DL (ref 0.7–1.3)
DIFFERENTIAL METHOD BLD: ABNORMAL
EOSINOPHIL # BLD: 0.4 K/UL (ref 0–0.4)
EOSINOPHIL NFR BLD: 4 % (ref 0–7)
ERYTHROCYTE [DISTWIDTH] IN BLOOD BY AUTOMATED COUNT: 12.9 % (ref 11.5–14.5)
EST. AVERAGE GLUCOSE BLD GHB EST-MCNC: 280 MG/DL
GLOBULIN SER CALC-MCNC: 3.4 G/DL (ref 2–4)
GLUCOSE SERPL-MCNC: 344 MG/DL (ref 65–100)
HBA1C MFR BLD: 11.4 % (ref 4–5.6)
HCT VFR BLD AUTO: 51.4 % (ref 36.6–50.3)
HCV AB SER IA-ACNC: <0.02 INDEX
HCV AB SERPL QL IA: NONREACTIVE
HDLC SERPL-MCNC: 36 MG/DL
HDLC SERPL: 6.8 (ref 0–5)
HGB BLD-MCNC: 16.6 G/DL (ref 12.1–17)
IMM GRANULOCYTES # BLD AUTO: 0 K/UL (ref 0–0.04)
IMM GRANULOCYTES NFR BLD AUTO: 0 % (ref 0–0.5)
LDLC SERPL CALC-MCNC: 134.4 MG/DL (ref 0–100)
LYMPHOCYTES # BLD: 2.1 K/UL (ref 0.8–3.5)
LYMPHOCYTES NFR BLD: 25 % (ref 12–49)
MCH RBC QN AUTO: 29.5 PG (ref 26–34)
MCHC RBC AUTO-ENTMCNC: 32.3 G/DL (ref 30–36.5)
MCV RBC AUTO: 91.3 FL (ref 80–99)
MONOCYTES # BLD: 0.7 K/UL (ref 0–1)
MONOCYTES NFR BLD: 8 % (ref 5–13)
NEUTS SEG # BLD: 5.1 K/UL (ref 1.8–8)
NEUTS SEG NFR BLD: 62 % (ref 32–75)
NRBC # BLD: 0 K/UL (ref 0–0.01)
NRBC BLD-RTO: 0 PER 100 WBC
PLATELET # BLD AUTO: 245 K/UL (ref 150–400)
PMV BLD AUTO: 10.5 FL (ref 8.9–12.9)
POTASSIUM SERPL-SCNC: 4.7 MMOL/L (ref 3.5–5.1)
PROT SERPL-MCNC: 7 G/DL (ref 6.4–8.2)
RBC # BLD AUTO: 5.63 M/UL (ref 4.1–5.7)
SERVICE CMNT-IMP: NORMAL
SODIUM SERPL-SCNC: 137 MMOL/L (ref 136–145)
TRIGL SERPL-MCNC: 373 MG/DL
VLDLC SERPL CALC-MCNC: 74.6 MG/DL
WBC # BLD AUTO: 8.4 K/UL (ref 4.1–11.1)

## 2023-10-06 RX ORDER — GLIPIZIDE 5 MG/1
5 TABLET ORAL
Qty: 60 TABLET | Refills: 0 | Status: SHIPPED | OUTPATIENT
Start: 2023-10-06

## 2023-10-06 RX ORDER — LOVASTATIN 40 MG/1
TABLET ORAL
Qty: 180 TABLET | Refills: 0 | Status: SHIPPED | OUTPATIENT
Start: 2023-10-06

## 2023-10-06 NOTE — TELEPHONE ENCOUNTER
Preferred meters and test strips include:     [employer] (e.g., Accu-Check Christina Plus, Accu-Check Guide, Accu-Chek Guide Me)     or "[employer] (e.g. TrueMetrix, TrueTrack).    Would you consider one of the preferred/covered meters?

## 2023-10-07 LAB
PSA FREE MFR SERPL: 41.8 %
PSA FREE SERPL-MCNC: 0.46 NG/ML
PSA SERPL-MCNC: 1.1 NG/ML (ref 0–4)

## 2023-10-10 RX ORDER — BLOOD-GLUCOSE METER
EACH MISCELLANEOUS
COMMUNITY
End: 2023-10-10

## 2023-10-10 RX ORDER — BLOOD-GLUCOSE METER
KIT MISCELLANEOUS
COMMUNITY
Start: 2018-07-17

## 2023-10-10 RX ORDER — LANCETS 30 GAUGE
EACH MISCELLANEOUS
Qty: 100 EACH | Refills: 5 | Status: CANCELLED | OUTPATIENT
Start: 2023-10-10

## 2023-10-10 RX ORDER — BLOOD SUGAR DIAGNOSTIC
STRIP MISCELLANEOUS
COMMUNITY
Start: 2020-08-06

## 2023-10-10 RX ORDER — BLOOD-GLUCOSE METER
1 KIT MISCELLANEOUS DAILY
Qty: 1 KIT | Refills: 0 | Status: CANCELLED | OUTPATIENT
Start: 2023-10-10

## 2024-01-15 ENCOUNTER — APPOINTMENT (OUTPATIENT)
Facility: HOSPITAL | Age: 79
End: 2024-01-15
Payer: MEDICARE

## 2024-01-15 ENCOUNTER — HOSPITAL ENCOUNTER (EMERGENCY)
Facility: HOSPITAL | Age: 79
Discharge: ANOTHER HOSPITAL | End: 2024-01-16
Attending: EMERGENCY MEDICINE
Payer: MEDICARE

## 2024-01-15 DIAGNOSIS — I21.4 NSTEMI (NON-ST ELEVATED MYOCARDIAL INFARCTION) (HCC): Primary | ICD-10-CM

## 2024-01-15 LAB
ALBUMIN SERPL-MCNC: 4.6 G/DL (ref 3.5–5)
ALBUMIN/GLOB SERPL: 1.2 (ref 1.1–2.2)
ALP SERPL-CCNC: 120 U/L (ref 45–117)
ALT SERPL-CCNC: 17 U/L (ref 12–78)
ANION GAP SERPL CALC-SCNC: 9 MMOL/L (ref 5–15)
APTT PPP: 28.2 SEC (ref 22.1–31)
AST SERPL-CCNC: 14 U/L (ref 15–37)
BASOPHILS # BLD: 0.1 K/UL (ref 0–0.1)
BASOPHILS NFR BLD: 2 % (ref 0–1)
BILIRUB SERPL-MCNC: 1.1 MG/DL (ref 0.2–1)
BUN SERPL-MCNC: 24 MG/DL (ref 6–20)
BUN/CREAT SERPL: 13 (ref 12–20)
CALCIUM SERPL-MCNC: 10.9 MG/DL (ref 8.5–10.1)
CHLORIDE SERPL-SCNC: 98 MMOL/L (ref 97–108)
CO2 SERPL-SCNC: 28 MMOL/L (ref 21–32)
CREAT SERPL-MCNC: 1.89 MG/DL (ref 0.7–1.3)
DIFFERENTIAL METHOD BLD: ABNORMAL
EKG ATRIAL RATE: 108 BPM
EKG DIAGNOSIS: NORMAL
EKG P AXIS: 61 DEGREES
EKG P-R INTERVAL: 206 MS
EKG Q-T INTERVAL: 314 MS
EKG QRS DURATION: 96 MS
EKG QTC CALCULATION (BAZETT): 420 MS
EKG R AXIS: -58 DEGREES
EKG T AXIS: 104 DEGREES
EKG VENTRICULAR RATE: 108 BPM
EOSINOPHIL # BLD: 0.3 K/UL (ref 0–0.4)
EOSINOPHIL NFR BLD: 4 % (ref 0–7)
ERYTHROCYTE [DISTWIDTH] IN BLOOD BY AUTOMATED COUNT: 12.7 % (ref 11.5–14.5)
FLUAV RNA SPEC QL NAA+PROBE: NOT DETECTED
FLUBV RNA SPEC QL NAA+PROBE: NOT DETECTED
GLOBULIN SER CALC-MCNC: 3.9 G/DL (ref 2–4)
GLUCOSE SERPL-MCNC: 304 MG/DL (ref 65–100)
HCT VFR BLD AUTO: 55.9 % (ref 36.6–50.3)
HGB BLD-MCNC: 18.2 G/DL (ref 12.1–17)
IMM GRANULOCYTES # BLD AUTO: 0 K/UL (ref 0–0.04)
IMM GRANULOCYTES NFR BLD AUTO: 0 % (ref 0–0.5)
INR PPP: 1 (ref 0.9–1.1)
LYMPHOCYTES # BLD: 2.7 K/UL (ref 0.8–3.5)
LYMPHOCYTES NFR BLD: 38 % (ref 12–49)
MCH RBC QN AUTO: 28.9 PG (ref 26–34)
MCHC RBC AUTO-ENTMCNC: 32.6 G/DL (ref 30–36.5)
MCV RBC AUTO: 88.9 FL (ref 80–99)
MONOCYTES # BLD: 0.6 K/UL (ref 0–1)
MONOCYTES NFR BLD: 8 % (ref 5–13)
NEUTS SEG # BLD: 3.5 K/UL (ref 1.8–8)
NEUTS SEG NFR BLD: 48 % (ref 32–75)
NRBC # BLD: 0 K/UL (ref 0–0.01)
NRBC BLD-RTO: 0 PER 100 WBC
PLATELET # BLD AUTO: 254 K/UL (ref 150–400)
PMV BLD AUTO: 9.3 FL (ref 8.9–12.9)
POTASSIUM SERPL-SCNC: 4.2 MMOL/L (ref 3.5–5.1)
PROT SERPL-MCNC: 8.5 G/DL (ref 6.4–8.2)
PROTHROMBIN TIME: 10.1 SEC (ref 9–11.1)
RBC # BLD AUTO: 6.29 M/UL (ref 4.1–5.7)
SARS-COV-2 RNA RESP QL NAA+PROBE: NOT DETECTED
SODIUM SERPL-SCNC: 135 MMOL/L (ref 136–145)
THERAPEUTIC RANGE: NORMAL SECS (ref 58–77)
TROPONIN I SERPL HS-MCNC: 248 NG/L (ref 0–76)
TROPONIN I SERPL HS-MCNC: 279 NG/L (ref 0–76)
UFH PPP CHRO-ACNC: 0.27 IU/ML
UFH PPP CHRO-ACNC: <0.1 IU/ML
WBC # BLD AUTO: 7.3 K/UL (ref 4.1–11.1)

## 2024-01-15 PROCEDURE — 85520 HEPARIN ASSAY: CPT

## 2024-01-15 PROCEDURE — 96376 TX/PRO/DX INJ SAME DRUG ADON: CPT

## 2024-01-15 PROCEDURE — 85025 COMPLETE CBC W/AUTO DIFF WBC: CPT

## 2024-01-15 PROCEDURE — 71045 X-RAY EXAM CHEST 1 VIEW: CPT

## 2024-01-15 PROCEDURE — 36415 COLL VENOUS BLD VENIPUNCTURE: CPT

## 2024-01-15 PROCEDURE — 85610 PROTHROMBIN TIME: CPT

## 2024-01-15 PROCEDURE — 84484 ASSAY OF TROPONIN QUANT: CPT

## 2024-01-15 PROCEDURE — 99285 EMERGENCY DEPT VISIT HI MDM: CPT

## 2024-01-15 PROCEDURE — 96366 THER/PROPH/DIAG IV INF ADDON: CPT

## 2024-01-15 PROCEDURE — 6360000002 HC RX W HCPCS: Performed by: EMERGENCY MEDICINE

## 2024-01-15 PROCEDURE — 96375 TX/PRO/DX INJ NEW DRUG ADDON: CPT

## 2024-01-15 PROCEDURE — 96365 THER/PROPH/DIAG IV INF INIT: CPT

## 2024-01-15 PROCEDURE — 93005 ELECTROCARDIOGRAM TRACING: CPT | Performed by: EMERGENCY MEDICINE

## 2024-01-15 PROCEDURE — 80053 COMPREHEN METABOLIC PANEL: CPT

## 2024-01-15 PROCEDURE — 87636 SARSCOV2 & INF A&B AMP PRB: CPT

## 2024-01-15 PROCEDURE — 85730 THROMBOPLASTIN TIME PARTIAL: CPT

## 2024-01-15 RX ORDER — HEPARIN SODIUM 1000 [USP'U]/ML
2000 INJECTION, SOLUTION INTRAVENOUS; SUBCUTANEOUS PRN
Status: DISCONTINUED | OUTPATIENT
Start: 2024-01-15 | End: 2024-01-16 | Stop reason: HOSPADM

## 2024-01-15 RX ORDER — HEPARIN SODIUM 1000 [USP'U]/ML
4000 INJECTION, SOLUTION INTRAVENOUS; SUBCUTANEOUS PRN
Status: DISCONTINUED | OUTPATIENT
Start: 2024-01-15 | End: 2024-01-16 | Stop reason: HOSPADM

## 2024-01-15 RX ORDER — HEPARIN SODIUM 1000 [USP'U]/ML
4000 INJECTION, SOLUTION INTRAVENOUS; SUBCUTANEOUS ONCE
Status: COMPLETED | OUTPATIENT
Start: 2024-01-15 | End: 2024-01-15

## 2024-01-15 RX ORDER — HEPARIN SODIUM 10000 [USP'U]/100ML
11.8-3 INJECTION, SOLUTION INTRAVENOUS CONTINUOUS
Status: DISCONTINUED | OUTPATIENT
Start: 2024-01-15 | End: 2024-01-16 | Stop reason: HOSPADM

## 2024-01-15 RX ADMIN — HEPARIN SODIUM 4000 UNITS: 1000 INJECTION INTRAVENOUS; SUBCUTANEOUS at 14:31

## 2024-01-15 RX ADMIN — HEPARIN SODIUM 11.8 UNITS/KG/HR: 10000 INJECTION, SOLUTION INTRAVENOUS at 14:34

## 2024-01-15 RX ADMIN — HEPARIN SODIUM 13.8 UNITS/KG/HR: 10000 INJECTION, SOLUTION INTRAVENOUS at 22:35

## 2024-01-15 RX ADMIN — HEPARIN SODIUM 2000 UNITS: 1000 INJECTION INTRAVENOUS; SUBCUTANEOUS at 22:33

## 2024-01-15 ASSESSMENT — LIFESTYLE VARIABLES
HOW OFTEN DO YOU HAVE A DRINK CONTAINING ALCOHOL: NEVER
HOW MANY STANDARD DRINKS CONTAINING ALCOHOL DO YOU HAVE ON A TYPICAL DAY: PATIENT DOES NOT DRINK

## 2024-01-15 ASSESSMENT — PAIN SCALES - GENERAL: PAINLEVEL_OUTOF10: 3

## 2024-01-15 ASSESSMENT — PAIN - FUNCTIONAL ASSESSMENT: PAIN_FUNCTIONAL_ASSESSMENT: 0-10

## 2024-01-15 NOTE — ED PROVIDER NOTES
[facility] EMERGENCY DEP  EMERGENCY DEPARTMENT ENCOUNTER       Pt Name: Nicola Draper  MRN: [id number]  Birthdate 1945  Date of evaluation: 1/15/2024  Provider: Sofi Dorsey MD   PCP: Danyell Toussaint MD  Note Started: 6:38 PM 1/15/24     CHIEF COMPLAINT       Chief Complaint   Patient presents with    Chest Pain        HISTORY OF PRESENT ILLNESS: 1 or more elements      History From: Patient  Limitations: None     Nicola Draper is a 78 y.o. male who presents complaining of chest pressure that radiates down his arm.  Patient reports he has a history of an MI.  Reports pressure today was a 3 out of 10 and felt similar to prior MI pain.  Reports it started around 9 AM.  Took nitroglycerin at 9 AM and at noon.  Reports only minimal relief with nitroglycerin.  Also took aspirin prior to arrival.  No shortness of breath, nausea, diaphoresis.  Patient reports he had an MI in 2021.     Nursing Notes were all reviewed and agreed with or any disagreements were addressed in the HPI.       PHYSICAL EXAM      ED Triage Vitals   Enc Vitals Group      BP 01/15/24 1239 (!) 150/88      Pulse 01/15/24 1232 (!) 103      Respirations 01/15/24 1232 18      Temp 01/15/24 1232 98.2 °F (36.8 °C)      Temp Source 01/15/24 1232 Oral      SpO2 01/15/24 1232 98 %      Weight - Scale 01/15/24 1232 84.4 kg (186 lb)      Height 01/15/24 1232 1.829 m (6')      Head Circumference --       Peak Flow --       Pain Score --       Pain Loc --       Pain Edu? --       Excl. in ? --               Physical Exam  Constitutional:       Appearance: Normal appearance.   Cardiovascular:      Rate and Rhythm: Normal rate and regular rhythm.   Pulmonary:      Effort: Pulmonary effort is normal.      Breath sounds: Normal breath sounds.   Abdominal:      Palpations: Abdomen is soft.      Tenderness: There is no abdominal tenderness.   Musculoskeletal:      Right lower leg: No edema.      Left lower leg: No edema.   Neurological:      Mental Status:

## 2024-01-15 NOTE — ED NOTES
Checked on patient. Patient resting with side rails up and call bell within reach. Respirations even and unlabored. No distress visualized. Care ongoing.

## 2024-01-15 NOTE — ED TRIAGE NOTES
Pt arrived POV with c/o  of L sided continuous chest pain radiating down into L arm that started at 9 am. Pt reports taking Nitro SL @ 9 am and 12 pm and reports 3/10 pain at this time. Pt alert and oriented x 4 and able to ambulate back to room.

## 2024-01-16 ENCOUNTER — APPOINTMENT (OUTPATIENT)
Facility: HOSPITAL | Age: 79
DRG: 233 | End: 2024-01-16
Payer: MEDICARE

## 2024-01-16 ENCOUNTER — ANESTHESIA (OUTPATIENT)
Facility: HOSPITAL | Age: 79
End: 2024-01-16
Payer: MEDICARE

## 2024-01-16 ENCOUNTER — HOSPITAL ENCOUNTER (INPATIENT)
Facility: HOSPITAL | Age: 79
DRG: 233 | End: 2024-01-16
Attending: EMERGENCY MEDICINE | Admitting: STUDENT IN AN ORGANIZED HEALTH CARE EDUCATION/TRAINING PROGRAM
Payer: MEDICARE

## 2024-01-16 ENCOUNTER — ANESTHESIA EVENT (OUTPATIENT)
Facility: HOSPITAL | Age: 79
DRG: 233 | End: 2024-01-16
Payer: MEDICARE

## 2024-01-16 ENCOUNTER — ANESTHESIA EVENT (OUTPATIENT)
Facility: HOSPITAL | Age: 79
End: 2024-01-16
Payer: MEDICARE

## 2024-01-16 ENCOUNTER — ANESTHESIA (OUTPATIENT)
Facility: HOSPITAL | Age: 79
DRG: 233 | End: 2024-01-16
Payer: MEDICARE

## 2024-01-16 ENCOUNTER — PREP FOR PROCEDURE (OUTPATIENT)
Age: 79
End: 2024-01-16

## 2024-01-16 VITALS
OXYGEN SATURATION: 95 % | RESPIRATION RATE: 15 BRPM | WEIGHT: 186 LBS | HEART RATE: 79 BPM | DIASTOLIC BLOOD PRESSURE: 81 MMHG | HEIGHT: 72 IN | SYSTOLIC BLOOD PRESSURE: 170 MMHG | BODY MASS INDEX: 25.19 KG/M2 | TEMPERATURE: 98.2 F

## 2024-01-16 DIAGNOSIS — R60.0 EDEMA OF LEFT UPPER EXTREMITY: ICD-10-CM

## 2024-01-16 DIAGNOSIS — I21.4 NSTEMI (NON-ST ELEVATED MYOCARDIAL INFARCTION) (HCC): Primary | ICD-10-CM

## 2024-01-16 DIAGNOSIS — R07.9 CHEST PAIN: ICD-10-CM

## 2024-01-16 DIAGNOSIS — Z95.1 S/P CABG X 4: ICD-10-CM

## 2024-01-16 LAB
ALBUMIN SERPL-MCNC: 3.1 G/DL (ref 3.5–5)
ALBUMIN/GLOB SERPL: 1.6 (ref 1.1–2.2)
ALP SERPL-CCNC: 46 U/L (ref 45–117)
ALT SERPL-CCNC: 12 U/L (ref 12–78)
ANION GAP SERPL CALC-SCNC: 5 MMOL/L (ref 5–15)
APTT PPP: 27.6 SEC (ref 22.1–31)
APTT PPP: 37.8 SEC (ref 22.1–31)
APTT PPP: NORMAL SEC (ref 22.1–31)
ARTERIAL PATENCY WRIST A: ABNORMAL
ARTERIAL PATENCY WRIST A: YES
AST SERPL-CCNC: 25 U/L (ref 15–37)
BASE DEFICIT BLDA-SCNC: 4.2 MMOL/L
BASE EXCESS BLDA CALC-SCNC: 0.9 MMOL/L
BASOPHILS # BLD: 0.1 K/UL (ref 0–0.1)
BASOPHILS # BLD: 0.1 K/UL (ref 0–0.1)
BASOPHILS NFR BLD: 1 % (ref 0–1)
BASOPHILS NFR BLD: 1 % (ref 0–1)
BDY SITE: ABNORMAL
BDY SITE: ABNORMAL
BILIRUB SERPL-MCNC: 1.3 MG/DL (ref 0.2–1)
BUN SERPL-MCNC: 18 MG/DL (ref 6–20)
BUN SERPL-MCNC: 21 MG/DL (ref 6–20)
BUN SERPL-MCNC: 24 MG/DL (ref 6–20)
BUN/CREAT SERPL: 11 (ref 12–20)
BUN/CREAT SERPL: 13 (ref 12–20)
BUN/CREAT SERPL: 16 (ref 12–20)
CA-I BLD-SCNC: 1.34 MMOL/L (ref 1.13–1.32)
CALCIUM SERPL-MCNC: 10.4 MG/DL (ref 8.5–10.1)
CALCIUM SERPL-MCNC: 10.4 MG/DL (ref 8.5–10.1)
CALCIUM SERPL-MCNC: 9.5 MG/DL (ref 8.5–10.1)
CHLORIDE SERPL-SCNC: 106 MMOL/L (ref 97–108)
CHLORIDE SERPL-SCNC: 106 MMOL/L (ref 97–108)
CHLORIDE SERPL-SCNC: 115 MMOL/L (ref 97–108)
CHOLEST SERPL-MCNC: 227 MG/DL
CO2 SERPL-SCNC: 21 MMOL/L (ref 21–32)
CO2 SERPL-SCNC: 23 MMOL/L (ref 21–32)
CO2 SERPL-SCNC: 28 MMOL/L (ref 21–32)
CREAT SERPL-MCNC: 1.49 MG/DL (ref 0.7–1.3)
CREAT SERPL-MCNC: 1.58 MG/DL (ref 0.7–1.3)
CREAT SERPL-MCNC: 1.68 MG/DL (ref 0.7–1.3)
DIFFERENTIAL METHOD BLD: ABNORMAL
DIFFERENTIAL METHOD BLD: NORMAL
ECHO BSA: 2.07 M2
EKG ATRIAL RATE: 100 BPM
EKG ATRIAL RATE: 96 BPM
EKG DIAGNOSIS: NORMAL
EKG P AXIS: 26 DEGREES
EKG P AXIS: 99 DEGREES
EKG P-R INTERVAL: 192 MS
EKG P-R INTERVAL: 194 MS
EKG Q-T INTERVAL: 336 MS
EKG Q-T INTERVAL: 374 MS
EKG Q-T INTERVAL: 518 MS
EKG QRS DURATION: 94 MS
EKG QRS DURATION: 94 MS
EKG QRS DURATION: 96 MS
EKG QTC CALCULATION (BAZETT): 424 MS
EKG QTC CALCULATION (BAZETT): 482 MS
EKG QTC CALCULATION (BAZETT): 486 MS
EKG R AXIS: -55 DEGREES
EKG R AXIS: -56 DEGREES
EKG R AXIS: -62 DEGREES
EKG T AXIS: 144 DEGREES
EKG T AXIS: 159 DEGREES
EKG T AXIS: 169 DEGREES
EKG VENTRICULAR RATE: 100 BPM
EKG VENTRICULAR RATE: 53 BPM
EKG VENTRICULAR RATE: 96 BPM
EOSINOPHIL # BLD: 0.1 K/UL (ref 0–0.4)
EOSINOPHIL # BLD: 0.2 K/UL (ref 0–0.4)
EOSINOPHIL NFR BLD: 1 % (ref 0–7)
EOSINOPHIL NFR BLD: 3 % (ref 0–7)
ERYTHROCYTE [DISTWIDTH] IN BLOOD BY AUTOMATED COUNT: 12.9 % (ref 11.5–14.5)
ERYTHROCYTE [DISTWIDTH] IN BLOOD BY AUTOMATED COUNT: 13.1 % (ref 11.5–14.5)
EST. AVERAGE GLUCOSE BLD GHB EST-MCNC: 163 MG/DL
EST. AVERAGE GLUCOSE BLD GHB EST-MCNC: 169 MG/DL
FIBRINOGEN PPP-MCNC: NORMAL MG/DL
FIO2 ON VENT: 60 %
GAS FLOW.O2 O2 DELIVERY SYS: 2 L/MIN
GAS FLOW.O2 SETTING OXYMISER: 14
GLOBULIN SER CALC-MCNC: 2 G/DL (ref 2–4)
GLUCOSE BLD STRIP.AUTO-MCNC: 112 MG/DL (ref 65–117)
GLUCOSE BLD STRIP.AUTO-MCNC: 162 MG/DL (ref 65–117)
GLUCOSE BLD STRIP.AUTO-MCNC: 202 MG/DL (ref 65–117)
GLUCOSE BLD STRIP.AUTO-MCNC: 240 MG/DL (ref 65–117)
GLUCOSE BLD STRIP.AUTO-MCNC: 248 MG/DL (ref 65–117)
GLUCOSE SERPL-MCNC: 126 MG/DL (ref 65–100)
GLUCOSE SERPL-MCNC: 230 MG/DL (ref 65–100)
GLUCOSE SERPL-MCNC: 288 MG/DL (ref 65–100)
HBA1C MFR BLD: 7.3 % (ref 4–5.6)
HBA1C MFR BLD: 7.5 % (ref 4–5.6)
HCO3 BLDA-SCNC: 21 MMOL/L (ref 22–26)
HCO3 BLDA-SCNC: 25 MMOL/L (ref 22–26)
HCT VFR BLD AUTO: 36.8 % (ref 36.6–50.3)
HCT VFR BLD AUTO: 48.7 % (ref 36.6–50.3)
HCT VFR BLD AUTO: 49.4 % (ref 36.6–50.3)
HCT VFR BLD AUTO: 50.6 % (ref 36.6–50.3)
HDLC SERPL-MCNC: 48 MG/DL
HDLC SERPL: 4.7 (ref 0–5)
HGB BLD-MCNC: 12.1 G/DL (ref 12.1–17)
HGB BLD-MCNC: 16.2 G/DL (ref 12.1–17)
HGB BLD-MCNC: 16.6 G/DL (ref 12.1–17)
HGB BLD-MCNC: 16.7 G/DL (ref 12.1–17)
HISTORY CHECK: NORMAL
IMM GRANULOCYTES # BLD AUTO: 0 K/UL (ref 0–0.04)
IMM GRANULOCYTES # BLD AUTO: 0 K/UL (ref 0–0.04)
IMM GRANULOCYTES NFR BLD AUTO: 0 % (ref 0–0.5)
IMM GRANULOCYTES NFR BLD AUTO: 0 % (ref 0–0.5)
INR PPP: 1.1 (ref 0.9–1.1)
INR PPP: 1.2 (ref 0.9–1.1)
INR PPP: NORMAL (ref 0.9–1.1)
LDLC SERPL CALC-MCNC: 144.6 MG/DL (ref 0–100)
LYMPHOCYTES # BLD: 1.9 K/UL (ref 0.8–3.5)
LYMPHOCYTES # BLD: 1.9 K/UL (ref 0.8–3.5)
LYMPHOCYTES NFR BLD: 23 % (ref 12–49)
LYMPHOCYTES NFR BLD: 25 % (ref 12–49)
MAGNESIUM SERPL-MCNC: 2.2 MG/DL (ref 1.6–2.4)
MAGNESIUM SERPL-MCNC: 2.2 MG/DL (ref 1.6–2.4)
MAGNESIUM SERPL-MCNC: 3.6 MG/DL (ref 1.6–2.4)
MCH RBC QN AUTO: 29.1 PG (ref 26–34)
MCH RBC QN AUTO: 29.2 PG (ref 26–34)
MCH RBC QN AUTO: 29.5 PG (ref 26–34)
MCH RBC QN AUTO: 29.6 PG (ref 26–34)
MCHC RBC AUTO-ENTMCNC: 32.9 G/DL (ref 30–36.5)
MCHC RBC AUTO-ENTMCNC: 33 G/DL (ref 30–36.5)
MCHC RBC AUTO-ENTMCNC: 33.3 G/DL (ref 30–36.5)
MCHC RBC AUTO-ENTMCNC: 33.6 G/DL (ref 30–36.5)
MCV RBC AUTO: 88.2 FL (ref 80–99)
MCV RBC AUTO: 88.3 FL (ref 80–99)
MCV RBC AUTO: 88.5 FL (ref 80–99)
MCV RBC AUTO: 88.9 FL (ref 80–99)
MONOCYTES # BLD: 0.8 K/UL (ref 0–1)
MONOCYTES # BLD: 0.9 K/UL (ref 0–1)
MONOCYTES NFR BLD: 10 % (ref 5–13)
MONOCYTES NFR BLD: 11 % (ref 5–13)
NEUTS SEG # BLD: 4.8 K/UL (ref 1.8–8)
NEUTS SEG # BLD: 5.2 K/UL (ref 1.8–8)
NEUTS SEG NFR BLD: 61 % (ref 32–75)
NEUTS SEG NFR BLD: 64 % (ref 32–75)
NRBC # BLD: 0 K/UL (ref 0–0.01)
NRBC BLD-RTO: 0 PER 100 WBC
P2Y12 PLT RESPONSE: 151 PRU (ref 194–418)
PCO2 BLDA: 37 MMHG (ref 35–45)
PCO2 BLDA: 39 MMHG (ref 35–45)
PEEP RESPIRATORY: 5
PH BLDA: 7.36 (ref 7.35–7.45)
PH BLDA: 7.43 (ref 7.35–7.45)
PLATELET # BLD AUTO: 151 K/UL (ref 150–400)
PLATELET # BLD AUTO: 187 K/UL (ref 150–400)
PLATELET # BLD AUTO: 211 K/UL (ref 150–400)
PLATELET # BLD AUTO: 221 K/UL (ref 150–400)
PMV BLD AUTO: 10.6 FL (ref 8.9–12.9)
PMV BLD AUTO: 9.3 FL (ref 8.9–12.9)
PMV BLD AUTO: 9.3 FL (ref 8.9–12.9)
PMV BLD AUTO: 9.9 FL (ref 8.9–12.9)
PO2 BLDA: 104 MMHG (ref 80–100)
PO2 BLDA: 106 MMHG (ref 80–100)
POTASSIUM SERPL-SCNC: 4 MMOL/L (ref 3.5–5.1)
POTASSIUM SERPL-SCNC: 4.3 MMOL/L (ref 3.5–5.1)
POTASSIUM SERPL-SCNC: 4.7 MMOL/L (ref 3.5–5.1)
PRESSURE SUPPORT SETTING VENT: 10
PROT SERPL-MCNC: 5.1 G/DL (ref 6.4–8.2)
PROTHROMBIN TIME: 11.1 SEC (ref 9–11.1)
PROTHROMBIN TIME: 12.6 SEC (ref 9–11.1)
PROTHROMBIN TIME: NORMAL SEC (ref 9–11.1)
RBC # BLD AUTO: 4.14 M/UL (ref 4.1–5.7)
RBC # BLD AUTO: 5.5 M/UL (ref 4.1–5.7)
RBC # BLD AUTO: 5.6 M/UL (ref 4.1–5.7)
RBC # BLD AUTO: 5.73 M/UL (ref 4.1–5.7)
SAO2 % BLD: 98 % (ref 92–97)
SAO2 % BLD: 98 % (ref 92–97)
SAO2% DEVICE SAO2% SENSOR NAME: ABNORMAL
SAO2% DEVICE SAO2% SENSOR NAME: ABNORMAL
SERVICE CMNT-IMP: ABNORMAL
SERVICE CMNT-IMP: NORMAL
SODIUM SERPL-SCNC: 134 MMOL/L (ref 136–145)
SODIUM SERPL-SCNC: 139 MMOL/L (ref 136–145)
SODIUM SERPL-SCNC: 141 MMOL/L (ref 136–145)
SPECIMEN SITE: ABNORMAL
SPECIMEN SITE: ABNORMAL
THERAPEUTIC RANGE: ABNORMAL SECS (ref 58–77)
THERAPEUTIC RANGE: NORMAL SECS (ref 58–77)
THERAPEUTIC RANGE: NORMAL SECS (ref 58–77)
TRIGL SERPL-MCNC: 172 MG/DL
TROPONIN I SERPL HS-MCNC: 362 NG/L (ref 0–76)
TROPONIN I SERPL HS-MCNC: 375 NG/L (ref 0–76)
UFH PPP CHRO-ACNC: 0.31 IU/ML
VLDLC SERPL CALC-MCNC: 34.4 MG/DL
VT SETTING VENT: 450
WBC # BLD AUTO: 10.4 K/UL (ref 4.1–11.1)
WBC # BLD AUTO: 7.9 K/UL (ref 4.1–11.1)
WBC # BLD AUTO: 8.1 K/UL (ref 4.1–11.1)
WBC # BLD AUTO: 8.8 K/UL (ref 4.1–11.1)

## 2024-01-16 PROCEDURE — 6360000002 HC RX W HCPCS

## 2024-01-16 PROCEDURE — 33533 CABG ARTERIAL SINGLE: CPT | Performed by: THORACIC SURGERY (CARDIOTHORACIC VASCULAR SURGERY)

## 2024-01-16 PROCEDURE — 6360000002 HC RX W HCPCS: Performed by: STUDENT IN AN ORGANIZED HEALTH CARE EDUCATION/TRAINING PROGRAM

## 2024-01-16 PROCEDURE — 6360000002 HC RX W HCPCS: Performed by: THORACIC SURGERY (CARDIOTHORACIC VASCULAR SURGERY)

## 2024-01-16 PROCEDURE — 2580000003 HC RX 258: Performed by: STUDENT IN AN ORGANIZED HEALTH CARE EDUCATION/TRAINING PROGRAM

## 2024-01-16 PROCEDURE — 5A1935Z RESPIRATORY VENTILATION, LESS THAN 24 CONSECUTIVE HOURS: ICD-10-PCS | Performed by: HOSPITALIST

## 2024-01-16 PROCEDURE — 86900 BLOOD TYPING SEROLOGIC ABO: CPT

## 2024-01-16 PROCEDURE — 2500000003 HC RX 250 WO HCPCS: Performed by: STUDENT IN AN ORGANIZED HEALTH CARE EDUCATION/TRAINING PROGRAM

## 2024-01-16 PROCEDURE — P9045 ALBUMIN (HUMAN), 5%, 250 ML: HCPCS

## 2024-01-16 PROCEDURE — 84443 ASSAY THYROID STIM HORMONE: CPT

## 2024-01-16 PROCEDURE — 86850 RBC ANTIBODY SCREEN: CPT

## 2024-01-16 PROCEDURE — 36415 COLL VENOUS BLD VENIPUNCTURE: CPT

## 2024-01-16 PROCEDURE — 6360000002 HC RX W HCPCS: Performed by: EMERGENCY MEDICINE

## 2024-01-16 PROCEDURE — C1729 CATH, DRAINAGE: HCPCS | Performed by: THORACIC SURGERY (CARDIOTHORACIC VASCULAR SURGERY)

## 2024-01-16 PROCEDURE — 85576 BLOOD PLATELET AGGREGATION: CPT

## 2024-01-16 PROCEDURE — 33970 AORTIC CIRCULATION ASSIST: CPT | Performed by: STUDENT IN AN ORGANIZED HEALTH CARE EDUCATION/TRAINING PROGRAM

## 2024-01-16 PROCEDURE — 2580000003 HC RX 258

## 2024-01-16 PROCEDURE — 85027 COMPLETE CBC AUTOMATED: CPT

## 2024-01-16 PROCEDURE — 2580000003 HC RX 258: Performed by: NURSE ANESTHETIST, CERTIFIED REGISTERED

## 2024-01-16 PROCEDURE — 37799 UNLISTED PX VASCULAR SURGERY: CPT

## 2024-01-16 PROCEDURE — 85730 THROMBOPLASTIN TIME PARTIAL: CPT

## 2024-01-16 PROCEDURE — 2580000003 HC RX 258: Performed by: THORACIC SURGERY (CARDIOTHORACIC VASCULAR SURGERY)

## 2024-01-16 PROCEDURE — 02100Z9 BYPASS CORONARY ARTERY, ONE ARTERY FROM LEFT INTERNAL MAMMARY, OPEN APPROACH: ICD-10-PCS | Performed by: THORACIC SURGERY (CARDIOTHORACIC VASCULAR SURGERY)

## 2024-01-16 PROCEDURE — 5A02210 ASSISTANCE WITH CARDIAC OUTPUT USING BALLOON PUMP, CONTINUOUS: ICD-10-PCS | Performed by: THORACIC SURGERY (CARDIOTHORACIC VASCULAR SURGERY)

## 2024-01-16 PROCEDURE — 33519 CABG ARTERY-VEIN THREE: CPT

## 2024-01-16 PROCEDURE — 2720000010 HC SURG SUPPLY STERILE: Performed by: THORACIC SURGERY (CARDIOTHORACIC VASCULAR SURGERY)

## 2024-01-16 PROCEDURE — C1768 GRAFT, VASCULAR: HCPCS | Performed by: THORACIC SURGERY (CARDIOTHORACIC VASCULAR SURGERY)

## 2024-01-16 PROCEDURE — 5A1221Z PERFORMANCE OF CARDIAC OUTPUT, CONTINUOUS: ICD-10-PCS | Performed by: THORACIC SURGERY (CARDIOTHORACIC VASCULAR SURGERY)

## 2024-01-16 PROCEDURE — C1781 MESH (IMPLANTABLE): HCPCS | Performed by: THORACIC SURGERY (CARDIOTHORACIC VASCULAR SURGERY)

## 2024-01-16 PROCEDURE — 06BP4ZZ EXCISION OF RIGHT SAPHENOUS VEIN, PERCUTANEOUS ENDOSCOPIC APPROACH: ICD-10-PCS | Performed by: THORACIC SURGERY (CARDIOTHORACIC VASCULAR SURGERY)

## 2024-01-16 PROCEDURE — 3700000000 HC ANESTHESIA ATTENDED CARE: Performed by: THORACIC SURGERY (CARDIOTHORACIC VASCULAR SURGERY)

## 2024-01-16 PROCEDURE — 3600000008 HC SURGERY OHS BASE: Performed by: THORACIC SURGERY (CARDIOTHORACIC VASCULAR SURGERY)

## 2024-01-16 PROCEDURE — B2151ZZ FLUOROSCOPY OF LEFT HEART USING LOW OSMOLAR CONTRAST: ICD-10-PCS | Performed by: STUDENT IN AN ORGANIZED HEALTH CARE EDUCATION/TRAINING PROGRAM

## 2024-01-16 PROCEDURE — 2000000000 HC ICU R&B

## 2024-01-16 PROCEDURE — 021209W BYPASS CORONARY ARTERY, THREE ARTERIES FROM AORTA WITH AUTOLOGOUS VENOUS TISSUE, OPEN APPROACH: ICD-10-PCS | Performed by: THORACIC SURGERY (CARDIOTHORACIC VASCULAR SURGERY)

## 2024-01-16 PROCEDURE — 86901 BLOOD TYPING SEROLOGIC RH(D): CPT

## 2024-01-16 PROCEDURE — 83735 ASSAY OF MAGNESIUM: CPT

## 2024-01-16 PROCEDURE — 33533 CABG ARTERIAL SINGLE: CPT

## 2024-01-16 PROCEDURE — 6370000000 HC RX 637 (ALT 250 FOR IP): Performed by: NURSE ANESTHETIST, CERTIFIED REGISTERED

## 2024-01-16 PROCEDURE — 76998 US GUIDE INTRAOP: CPT | Performed by: THORACIC SURGERY (CARDIOTHORACIC VASCULAR SURGERY)

## 2024-01-16 PROCEDURE — 6360000002 HC RX W HCPCS: Performed by: NURSE ANESTHETIST, CERTIFIED REGISTERED

## 2024-01-16 PROCEDURE — 85025 COMPLETE CBC W/AUTO DIFF WBC: CPT

## 2024-01-16 PROCEDURE — 80061 LIPID PANEL: CPT

## 2024-01-16 PROCEDURE — A4217 STERILE WATER/SALINE, 500 ML: HCPCS | Performed by: THORACIC SURGERY (CARDIOTHORACIC VASCULAR SURGERY)

## 2024-01-16 PROCEDURE — 0BH17EZ INSERTION OF ENDOTRACHEAL AIRWAY INTO TRACHEA, VIA NATURAL OR ARTIFICIAL OPENING: ICD-10-PCS | Performed by: HOSPITALIST

## 2024-01-16 PROCEDURE — 80053 COMPREHEN METABOLIC PANEL: CPT

## 2024-01-16 PROCEDURE — 3700000001 HC ADD 15 MINUTES (ANESTHESIA): Performed by: THORACIC SURGERY (CARDIOTHORACIC VASCULAR SURGERY)

## 2024-01-16 PROCEDURE — A4216 STERILE WATER/SALINE, 10 ML: HCPCS

## 2024-01-16 PROCEDURE — 86923 COMPATIBILITY TEST ELECTRIC: CPT

## 2024-01-16 PROCEDURE — 85610 PROTHROMBIN TIME: CPT

## 2024-01-16 PROCEDURE — C1894 INTRO/SHEATH, NON-LASER: HCPCS | Performed by: STUDENT IN AN ORGANIZED HEALTH CARE EDUCATION/TRAINING PROGRAM

## 2024-01-16 PROCEDURE — 2500000003 HC RX 250 WO HCPCS

## 2024-01-16 PROCEDURE — 93005 ELECTROCARDIOGRAM TRACING: CPT | Performed by: EMERGENCY MEDICINE

## 2024-01-16 PROCEDURE — 6370000000 HC RX 637 (ALT 250 FOR IP): Performed by: STUDENT IN AN ORGANIZED HEALTH CARE EDUCATION/TRAINING PROGRAM

## 2024-01-16 PROCEDURE — 99152 MOD SED SAME PHYS/QHP 5/>YRS: CPT | Performed by: STUDENT IN AN ORGANIZED HEALTH CARE EDUCATION/TRAINING PROGRAM

## 2024-01-16 PROCEDURE — 99223 1ST HOSP IP/OBS HIGH 75: CPT | Performed by: THORACIC SURGERY (CARDIOTHORACIC VASCULAR SURGERY)

## 2024-01-16 PROCEDURE — 33519 CABG ARTERY-VEIN THREE: CPT | Performed by: THORACIC SURGERY (CARDIOTHORACIC VASCULAR SURGERY)

## 2024-01-16 PROCEDURE — 33508 ENDOSCOPIC VEIN HARVEST: CPT

## 2024-01-16 PROCEDURE — 85520 HEPARIN ASSAY: CPT

## 2024-01-16 PROCEDURE — 93005 ELECTROCARDIOGRAM TRACING: CPT | Performed by: STUDENT IN AN ORGANIZED HEALTH CARE EDUCATION/TRAINING PROGRAM

## 2024-01-16 PROCEDURE — 94002 VENT MGMT INPAT INIT DAY: CPT

## 2024-01-16 PROCEDURE — 71045 X-RAY EXAM CHEST 1 VIEW: CPT

## 2024-01-16 PROCEDURE — 82803 BLOOD GASES ANY COMBINATION: CPT

## 2024-01-16 PROCEDURE — 80048 BASIC METABOLIC PNL TOTAL CA: CPT

## 2024-01-16 PROCEDURE — P9045 ALBUMIN (HUMAN), 5%, 250 ML: HCPCS | Performed by: NURSE ANESTHETIST, CERTIFIED REGISTERED

## 2024-01-16 PROCEDURE — 6370000000 HC RX 637 (ALT 250 FOR IP)

## 2024-01-16 PROCEDURE — 2709999900 HC NON-CHARGEABLE SUPPLY: Performed by: STUDENT IN AN ORGANIZED HEALTH CARE EDUCATION/TRAINING PROGRAM

## 2024-01-16 PROCEDURE — 2709999900 HC NON-CHARGEABLE SUPPLY: Performed by: THORACIC SURGERY (CARDIOTHORACIC VASCULAR SURGERY)

## 2024-01-16 PROCEDURE — 4A023N7 MEASUREMENT OF CARDIAC SAMPLING AND PRESSURE, LEFT HEART, PERCUTANEOUS APPROACH: ICD-10-PCS | Performed by: STUDENT IN AN ORGANIZED HEALTH CARE EDUCATION/TRAINING PROGRAM

## 2024-01-16 PROCEDURE — B2111ZZ FLUOROSCOPY OF MULTIPLE CORONARY ARTERIES USING LOW OSMOLAR CONTRAST: ICD-10-PCS | Performed by: STUDENT IN AN ORGANIZED HEALTH CARE EDUCATION/TRAINING PROGRAM

## 2024-01-16 PROCEDURE — 82330 ASSAY OF CALCIUM: CPT

## 2024-01-16 PROCEDURE — 99153 MOD SED SAME PHYS/QHP EA: CPT | Performed by: STUDENT IN AN ORGANIZED HEALTH CARE EDUCATION/TRAINING PROGRAM

## 2024-01-16 PROCEDURE — 4500000002 HC ER NO CHARGE

## 2024-01-16 PROCEDURE — 6360000004 HC RX CONTRAST MEDICATION: Performed by: STUDENT IN AN ORGANIZED HEALTH CARE EDUCATION/TRAINING PROGRAM

## 2024-01-16 PROCEDURE — 6370000000 HC RX 637 (ALT 250 FOR IP): Performed by: EMERGENCY MEDICINE

## 2024-01-16 PROCEDURE — 93458 L HRT ARTERY/VENTRICLE ANGIO: CPT | Performed by: STUDENT IN AN ORGANIZED HEALTH CARE EDUCATION/TRAINING PROGRAM

## 2024-01-16 PROCEDURE — C1713 ANCHOR/SCREW BN/BN,TIS/BN: HCPCS | Performed by: THORACIC SURGERY (CARDIOTHORACIC VASCULAR SURGERY)

## 2024-01-16 PROCEDURE — 33508 ENDOSCOPIC VEIN HARVEST: CPT | Performed by: THORACIC SURGERY (CARDIOTHORACIC VASCULAR SURGERY)

## 2024-01-16 PROCEDURE — 36600 WITHDRAWAL OF ARTERIAL BLOOD: CPT

## 2024-01-16 PROCEDURE — 83036 HEMOGLOBIN GLYCOSYLATED A1C: CPT

## 2024-01-16 PROCEDURE — 84484 ASSAY OF TROPONIN QUANT: CPT

## 2024-01-16 PROCEDURE — 2500000003 HC RX 250 WO HCPCS: Performed by: NURSE ANESTHETIST, CERTIFIED REGISTERED

## 2024-01-16 PROCEDURE — 96366 THER/PROPH/DIAG IV INF ADDON: CPT

## 2024-01-16 PROCEDURE — 2780000010 HC IMPLANT OTHER: Performed by: STUDENT IN AN ORGANIZED HEALTH CARE EDUCATION/TRAINING PROGRAM

## 2024-01-16 PROCEDURE — 3600000018 HC SURGERY OHS ADDTL 15MIN: Performed by: THORACIC SURGERY (CARDIOTHORACIC VASCULAR SURGERY)

## 2024-01-16 PROCEDURE — P9073 PLATELETS PHERESIS PATH REDU: HCPCS

## 2024-01-16 PROCEDURE — 82962 GLUCOSE BLOOD TEST: CPT

## 2024-01-16 PROCEDURE — C1769 GUIDE WIRE: HCPCS | Performed by: STUDENT IN AN ORGANIZED HEALTH CARE EDUCATION/TRAINING PROGRAM

## 2024-01-16 DEVICE — PUTTY BONE HEMSTAT ABSORB MTRX 2.0GRAM: Type: IMPLANTABLE DEVICE | Site: CHEST  WALL | Status: FUNCTIONAL

## 2024-01-16 RX ORDER — LIDOCAINE HYDROCHLORIDE 20 MG/ML
INJECTION, SOLUTION EPIDURAL; INFILTRATION; INTRACAUDAL; PERINEURAL PRN
Status: DISCONTINUED | OUTPATIENT
Start: 2024-01-16 | End: 2024-01-16 | Stop reason: SDUPTHER

## 2024-01-16 RX ORDER — FAMOTIDINE 20 MG/1
20 TABLET, FILM COATED ORAL DAILY
Status: COMPLETED | OUTPATIENT
Start: 2024-01-16 | End: 2024-01-25

## 2024-01-16 RX ORDER — NICARDIPINE HYDROCHLORIDE 2.5 MG/ML
INJECTION INTRAVENOUS
Status: DISPENSED
Start: 2024-01-16 | End: 2024-01-17

## 2024-01-16 RX ORDER — AMIODARONE HYDROCHLORIDE 200 MG/1
400 TABLET ORAL 2 TIMES DAILY
Status: DISCONTINUED | OUTPATIENT
Start: 2024-01-17 | End: 2024-01-27 | Stop reason: HOSPADM

## 2024-01-16 RX ORDER — SODIUM CHLORIDE, SODIUM LACTATE, POTASSIUM CHLORIDE, CALCIUM CHLORIDE 600; 310; 30; 20 MG/100ML; MG/100ML; MG/100ML; MG/100ML
INJECTION, SOLUTION INTRAVENOUS CONTINUOUS PRN
Status: DISCONTINUED | OUTPATIENT
Start: 2024-01-16 | End: 2024-01-16 | Stop reason: SDUPTHER

## 2024-01-16 RX ORDER — HEPARIN SODIUM 1000 [USP'U]/ML
INJECTION, SOLUTION INTRAVENOUS; SUBCUTANEOUS PRN
Status: DISCONTINUED | OUTPATIENT
Start: 2024-01-16 | End: 2024-01-16 | Stop reason: HOSPADM

## 2024-01-16 RX ORDER — DESMOPRESSIN ACETATE 4 UG/ML
INJECTION, SOLUTION INTRAVENOUS; SUBCUTANEOUS PRN
Status: DISCONTINUED | OUTPATIENT
Start: 2024-01-16 | End: 2024-01-16 | Stop reason: SDUPTHER

## 2024-01-16 RX ORDER — NEOSTIGMINE METHYLSULFATE 1 MG/ML
INJECTION, SOLUTION INTRAVENOUS PRN
Status: DISCONTINUED | OUTPATIENT
Start: 2024-01-16 | End: 2024-01-16 | Stop reason: SDUPTHER

## 2024-01-16 RX ORDER — CHLORHEXIDINE GLUCONATE ORAL RINSE 1.2 MG/ML
15 SOLUTION DENTAL 2 TIMES DAILY
Status: DISCONTINUED | OUTPATIENT
Start: 2024-01-16 | End: 2024-01-16

## 2024-01-16 RX ORDER — AMLODIPINE BESYLATE 5 MG/1
10 TABLET ORAL DAILY
Status: DISCONTINUED | OUTPATIENT
Start: 2024-01-16 | End: 2024-01-16

## 2024-01-16 RX ORDER — CHLORHEXIDINE GLUCONATE ORAL RINSE 1.2 MG/ML
15 SOLUTION DENTAL 2 TIMES DAILY
Status: DISPENSED | OUTPATIENT
Start: 2024-01-16 | End: 2024-01-25

## 2024-01-16 RX ORDER — ALBUTEROL SULFATE 2.5 MG/3ML
2.5 SOLUTION RESPIRATORY (INHALATION) EVERY 4 HOURS PRN
Status: DISCONTINUED | OUTPATIENT
Start: 2024-01-16 | End: 2024-01-27 | Stop reason: HOSPADM

## 2024-01-16 RX ORDER — MIDAZOLAM HYDROCHLORIDE 1 MG/ML
INJECTION INTRAMUSCULAR; INTRAVENOUS PRN
Status: DISCONTINUED | OUTPATIENT
Start: 2024-01-16 | End: 2024-01-16 | Stop reason: SDUPTHER

## 2024-01-16 RX ORDER — CEFAZOLIN SODIUM 1 G/3ML
INJECTION, POWDER, FOR SOLUTION INTRAMUSCULAR; INTRAVENOUS PRN
Status: DISCONTINUED | OUTPATIENT
Start: 2024-01-16 | End: 2024-01-16 | Stop reason: ALTCHOICE

## 2024-01-16 RX ORDER — PAPAVERINE HYDROCHLORIDE 30 MG/ML
INJECTION INTRAMUSCULAR; INTRAVENOUS PRN
Status: DISCONTINUED | OUTPATIENT
Start: 2024-01-16 | End: 2024-01-16 | Stop reason: ALTCHOICE

## 2024-01-16 RX ORDER — DIPHENHYDRAMINE HCL 25 MG
25 CAPSULE ORAL NIGHTLY PRN
Status: DISCONTINUED | OUTPATIENT
Start: 2024-01-17 | End: 2024-01-27 | Stop reason: HOSPADM

## 2024-01-16 RX ORDER — SODIUM CHLORIDE 9 MG/ML
INJECTION, SOLUTION INTRAVENOUS PRN
Status: DISCONTINUED | OUTPATIENT
Start: 2024-01-16 | End: 2024-01-19

## 2024-01-16 RX ORDER — PROPOFOL 10 MG/ML
5-50 INJECTION, EMULSION INTRAVENOUS CONTINUOUS
Status: DISCONTINUED | OUTPATIENT
Start: 2024-01-16 | End: 2024-01-18

## 2024-01-16 RX ORDER — CLOPIDOGREL BISULFATE 75 MG/1
75 TABLET ORAL DAILY
Status: DISCONTINUED | OUTPATIENT
Start: 2024-01-16 | End: 2024-01-16

## 2024-01-16 RX ORDER — SODIUM CHLORIDE 450 MG/100ML
INJECTION, SOLUTION INTRAVENOUS CONTINUOUS
Status: DISCONTINUED | OUTPATIENT
Start: 2024-01-16 | End: 2024-01-19

## 2024-01-16 RX ORDER — ASPIRIN 81 MG/1
81 TABLET ORAL DAILY
Status: DISCONTINUED | OUTPATIENT
Start: 2024-01-17 | End: 2024-01-27 | Stop reason: HOSPADM

## 2024-01-16 RX ORDER — PROTAMINE SULFATE 10 MG/ML
INJECTION, SOLUTION INTRAVENOUS PRN
Status: DISCONTINUED | OUTPATIENT
Start: 2024-01-16 | End: 2024-01-16 | Stop reason: SDUPTHER

## 2024-01-16 RX ORDER — LISINOPRIL 5 MG/1
2.5 TABLET ORAL DAILY
Status: DISCONTINUED | OUTPATIENT
Start: 2024-01-16 | End: 2024-01-16

## 2024-01-16 RX ORDER — POLYETHYLENE GLYCOL 3350 17 G/17G
17 POWDER, FOR SOLUTION ORAL DAILY PRN
Status: DISCONTINUED | OUTPATIENT
Start: 2024-01-16 | End: 2024-01-16

## 2024-01-16 RX ORDER — SODIUM CHLORIDE 0.9 % (FLUSH) 0.9 %
5-40 SYRINGE (ML) INJECTION PRN
Status: DISCONTINUED | OUTPATIENT
Start: 2024-01-16 | End: 2024-01-27 | Stop reason: HOSPADM

## 2024-01-16 RX ORDER — DEXTROSE MONOHYDRATE 100 MG/ML
INJECTION, SOLUTION INTRAVENOUS CONTINUOUS PRN
Status: DISCONTINUED | OUTPATIENT
Start: 2024-01-16 | End: 2024-01-16

## 2024-01-16 RX ORDER — PROPOFOL 10 MG/ML
INJECTION, EMULSION INTRAVENOUS
Status: DISPENSED
Start: 2024-01-16 | End: 2024-01-17

## 2024-01-16 RX ORDER — NITROGLYCERIN 20 MG/100ML
5-100 INJECTION INTRAVENOUS CONTINUOUS
Status: DISCONTINUED | OUTPATIENT
Start: 2024-01-16 | End: 2024-01-16

## 2024-01-16 RX ORDER — INSULIN LISPRO 100 [IU]/ML
0-6 INJECTION, SOLUTION INTRAVENOUS; SUBCUTANEOUS NIGHTLY
Status: DISCONTINUED | OUTPATIENT
Start: 2024-01-18 | End: 2024-01-18

## 2024-01-16 RX ORDER — CEFAZOLIN SODIUM 1 G/3ML
INJECTION, POWDER, FOR SOLUTION INTRAMUSCULAR; INTRAVENOUS PRN
Status: DISCONTINUED | OUTPATIENT
Start: 2024-01-16 | End: 2024-01-16 | Stop reason: SDUPTHER

## 2024-01-16 RX ORDER — MAGNESIUM SULFATE HEPTAHYDRATE 40 MG/ML
INJECTION, SOLUTION INTRAVENOUS PRN
Status: DISCONTINUED | OUTPATIENT
Start: 2024-01-16 | End: 2024-01-16 | Stop reason: SDUPTHER

## 2024-01-16 RX ORDER — INSULIN LISPRO 100 [IU]/ML
0-8 INJECTION, SOLUTION INTRAVENOUS; SUBCUTANEOUS
Status: DISCONTINUED | OUTPATIENT
Start: 2024-01-16 | End: 2024-01-16

## 2024-01-16 RX ORDER — ONDANSETRON 2 MG/ML
4 INJECTION INTRAMUSCULAR; INTRAVENOUS EVERY 4 HOURS PRN
Status: DISCONTINUED | OUTPATIENT
Start: 2024-01-16 | End: 2024-01-27 | Stop reason: HOSPADM

## 2024-01-16 RX ORDER — SENNA AND DOCUSATE SODIUM 50; 8.6 MG/1; MG/1
1 TABLET, FILM COATED ORAL 2 TIMES DAILY
Status: DISCONTINUED | OUTPATIENT
Start: 2024-01-16 | End: 2024-01-27 | Stop reason: HOSPADM

## 2024-01-16 RX ORDER — HYDROMORPHONE HYDROCHLORIDE 1 MG/ML
0.5 INJECTION, SOLUTION INTRAMUSCULAR; INTRAVENOUS; SUBCUTANEOUS EVERY 4 HOURS PRN
Status: DISCONTINUED | OUTPATIENT
Start: 2024-01-16 | End: 2024-01-27 | Stop reason: HOSPADM

## 2024-01-16 RX ORDER — SODIUM CHLORIDE 0.9 % (FLUSH) 0.9 %
5-40 SYRINGE (ML) INJECTION EVERY 12 HOURS SCHEDULED
Status: DISCONTINUED | OUTPATIENT
Start: 2024-01-16 | End: 2024-01-27 | Stop reason: HOSPADM

## 2024-01-16 RX ORDER — ALBUMIN, HUMAN INJ 5% 5 %
12.5 SOLUTION INTRAVENOUS PRN
Status: COMPLETED | OUTPATIENT
Start: 2024-01-16 | End: 2024-01-17

## 2024-01-16 RX ORDER — SODIUM CHLORIDE 0.9 % (FLUSH) 0.9 %
5-40 SYRINGE (ML) INJECTION PRN
Status: DISCONTINUED | OUTPATIENT
Start: 2024-01-16 | End: 2024-01-16

## 2024-01-16 RX ORDER — HEPARIN SODIUM 1000 [USP'U]/ML
2000 INJECTION, SOLUTION INTRAVENOUS; SUBCUTANEOUS PRN
Status: DISCONTINUED | OUTPATIENT
Start: 2024-01-16 | End: 2024-01-16

## 2024-01-16 RX ORDER — LIDOCAINE HYDROCHLORIDE 10 MG/ML
INJECTION, SOLUTION INFILTRATION; PERINEURAL PRN
Status: DISCONTINUED | OUTPATIENT
Start: 2024-01-16 | End: 2024-01-16 | Stop reason: HOSPADM

## 2024-01-16 RX ORDER — GLYCOPYRROLATE 0.2 MG/ML
INJECTION INTRAMUSCULAR; INTRAVENOUS PRN
Status: DISCONTINUED | OUTPATIENT
Start: 2024-01-16 | End: 2024-01-16 | Stop reason: SDUPTHER

## 2024-01-16 RX ORDER — LANOLIN ALCOHOL/MO/W.PET/CERES
6 CREAM (GRAM) TOPICAL NIGHTLY PRN
Status: DISCONTINUED | OUTPATIENT
Start: 2024-01-16 | End: 2024-01-27 | Stop reason: HOSPADM

## 2024-01-16 RX ORDER — HEPARIN SODIUM 1000 [USP'U]/ML
4000 INJECTION, SOLUTION INTRAVENOUS; SUBCUTANEOUS PRN
Status: DISCONTINUED | OUTPATIENT
Start: 2024-01-16 | End: 2024-01-16

## 2024-01-16 RX ORDER — FENTANYL CITRATE 50 UG/ML
INJECTION, SOLUTION INTRAMUSCULAR; INTRAVENOUS PRN
Status: DISCONTINUED | OUTPATIENT
Start: 2024-01-16 | End: 2024-01-16 | Stop reason: HOSPADM

## 2024-01-16 RX ORDER — SUCCINYLCHOLINE CHLORIDE 20 MG/ML
INJECTION INTRAMUSCULAR; INTRAVENOUS PRN
Status: DISCONTINUED | OUTPATIENT
Start: 2024-01-16 | End: 2024-01-16 | Stop reason: SDUPTHER

## 2024-01-16 RX ORDER — HYDRALAZINE HYDROCHLORIDE 20 MG/ML
10 INJECTION INTRAMUSCULAR; INTRAVENOUS EVERY 6 HOURS PRN
Status: DISCONTINUED | OUTPATIENT
Start: 2024-01-16 | End: 2024-01-27 | Stop reason: HOSPADM

## 2024-01-16 RX ORDER — ALBUMIN, HUMAN INJ 5% 5 %
SOLUTION INTRAVENOUS PRN
Status: DISCONTINUED | OUTPATIENT
Start: 2024-01-16 | End: 2024-01-16 | Stop reason: SDUPTHER

## 2024-01-16 RX ORDER — SODIUM CHLORIDE 0.9 % (FLUSH) 0.9 %
5-40 SYRINGE (ML) INJECTION EVERY 8 HOURS
Status: DISCONTINUED | OUTPATIENT
Start: 2024-01-16 | End: 2024-01-16

## 2024-01-16 RX ORDER — ASPIRIN 81 MG/1
81 TABLET ORAL DAILY
Status: DISCONTINUED | OUTPATIENT
Start: 2024-01-16 | End: 2024-01-16

## 2024-01-16 RX ORDER — ONDANSETRON 2 MG/ML
4 INJECTION INTRAMUSCULAR; INTRAVENOUS ONCE
Status: COMPLETED | OUTPATIENT
Start: 2024-01-16 | End: 2024-01-16

## 2024-01-16 RX ORDER — HEPARIN SODIUM 10000 [USP'U]/100ML
13.8-3 INJECTION, SOLUTION INTRAVENOUS CONTINUOUS
Status: DISCONTINUED | OUTPATIENT
Start: 2024-01-16 | End: 2024-01-16

## 2024-01-16 RX ORDER — LANOLIN ALCOHOL/MO/W.PET/CERES
400 CREAM (GRAM) TOPICAL 2 TIMES DAILY
Status: DISCONTINUED | OUTPATIENT
Start: 2024-01-17 | End: 2024-01-17

## 2024-01-16 RX ORDER — INSULIN GLARGINE 100 [IU]/ML
1-50 INJECTION, SOLUTION SUBCUTANEOUS
Status: ACTIVE | OUTPATIENT
Start: 2024-01-16 | End: 2024-01-17

## 2024-01-16 RX ORDER — OXYCODONE HYDROCHLORIDE 5 MG/1
5 TABLET ORAL EVERY 4 HOURS PRN
Status: DISCONTINUED | OUTPATIENT
Start: 2024-01-16 | End: 2024-01-27 | Stop reason: HOSPADM

## 2024-01-16 RX ORDER — 0.9 % SODIUM CHLORIDE 0.9 %
1000 INTRAVENOUS SOLUTION INTRAVENOUS ONCE
Status: COMPLETED | OUTPATIENT
Start: 2024-01-16 | End: 2024-01-16

## 2024-01-16 RX ORDER — SODIUM CHLORIDE 9 MG/ML
INJECTION, SOLUTION INTRAVENOUS CONTINUOUS
Status: DISCONTINUED | OUTPATIENT
Start: 2024-01-16 | End: 2024-01-19

## 2024-01-16 RX ORDER — ONDANSETRON 2 MG/ML
INJECTION INTRAMUSCULAR; INTRAVENOUS PRN
Status: DISCONTINUED | OUTPATIENT
Start: 2024-01-16 | End: 2024-01-16 | Stop reason: SDUPTHER

## 2024-01-16 RX ORDER — SODIUM CHLORIDE 9 MG/ML
INJECTION, SOLUTION INTRAVENOUS PRN
Status: DISCONTINUED | OUTPATIENT
Start: 2024-01-16 | End: 2024-01-16

## 2024-01-16 RX ORDER — VERAPAMIL HYDROCHLORIDE 2.5 MG/ML
INJECTION, SOLUTION INTRAVENOUS PRN
Status: DISCONTINUED | OUTPATIENT
Start: 2024-01-16 | End: 2024-01-16 | Stop reason: HOSPADM

## 2024-01-16 RX ORDER — ACETAMINOPHEN 325 MG/1
650 TABLET ORAL EVERY 6 HOURS PRN
Status: DISCONTINUED | OUTPATIENT
Start: 2024-01-16 | End: 2024-01-16

## 2024-01-16 RX ORDER — INSULIN LISPRO 100 [IU]/ML
0-12 INJECTION, SOLUTION INTRAVENOUS; SUBCUTANEOUS
Status: DISCONTINUED | OUTPATIENT
Start: 2024-01-18 | End: 2024-01-18

## 2024-01-16 RX ORDER — DEXTROSE MONOHYDRATE 100 MG/ML
INJECTION, SOLUTION INTRAVENOUS CONTINUOUS PRN
Status: DISCONTINUED | OUTPATIENT
Start: 2024-01-16 | End: 2024-01-19

## 2024-01-16 RX ORDER — MAGNESIUM SULFATE IN WATER 40 MG/ML
2000 INJECTION, SOLUTION INTRAVENOUS PRN
Status: DISCONTINUED | OUTPATIENT
Start: 2024-01-16 | End: 2024-01-27 | Stop reason: HOSPADM

## 2024-01-16 RX ORDER — LOVASTATIN 40 MG/1
80 TABLET ORAL NIGHTLY
Status: DISCONTINUED | OUTPATIENT
Start: 2024-01-16 | End: 2024-01-23

## 2024-01-16 RX ORDER — POLYETHYLENE GLYCOL 3350 17 G/17G
17 POWDER, FOR SOLUTION ORAL DAILY
Status: DISCONTINUED | OUTPATIENT
Start: 2024-01-16 | End: 2024-01-27 | Stop reason: HOSPADM

## 2024-01-16 RX ORDER — INSULIN GLARGINE 100 [IU]/ML
10 INJECTION, SOLUTION SUBCUTANEOUS DAILY
Status: DISCONTINUED | OUTPATIENT
Start: 2024-01-16 | End: 2024-01-16

## 2024-01-16 RX ORDER — LANOLIN ALCOHOL/MO/W.PET/CERES
3 CREAM (GRAM) TOPICAL NIGHTLY PRN
Status: DISCONTINUED | OUTPATIENT
Start: 2024-01-16 | End: 2024-01-16

## 2024-01-16 RX ORDER — BUPIVACAINE HYDROCHLORIDE 5 MG/ML
INJECTION, SOLUTION EPIDURAL; INTRACAUDAL PRN
Status: DISCONTINUED | OUTPATIENT
Start: 2024-01-16 | End: 2024-01-16 | Stop reason: ALTCHOICE

## 2024-01-16 RX ORDER — MORPHINE SULFATE 2 MG/ML
2 INJECTION, SOLUTION INTRAMUSCULAR; INTRAVENOUS EVERY 4 HOURS PRN
Status: DISCONTINUED | OUTPATIENT
Start: 2024-01-16 | End: 2024-01-16

## 2024-01-16 RX ORDER — METOPROLOL TARTRATE 1 MG/ML
2.5 INJECTION, SOLUTION INTRAVENOUS ONCE
Status: COMPLETED | OUTPATIENT
Start: 2024-01-16 | End: 2024-01-16

## 2024-01-16 RX ORDER — METOPROLOL SUCCINATE 50 MG/1
50 TABLET, EXTENDED RELEASE ORAL DAILY
Status: DISCONTINUED | OUTPATIENT
Start: 2024-01-16 | End: 2024-01-16

## 2024-01-16 RX ORDER — DEXMEDETOMIDINE HYDROCHLORIDE 4 UG/ML
.1-1.5 INJECTION, SOLUTION INTRAVENOUS CONTINUOUS
Status: DISCONTINUED | OUTPATIENT
Start: 2024-01-16 | End: 2024-01-18

## 2024-01-16 RX ORDER — ONDANSETRON 2 MG/ML
4 INJECTION INTRAMUSCULAR; INTRAVENOUS EVERY 6 HOURS PRN
Status: DISCONTINUED | OUTPATIENT
Start: 2024-01-16 | End: 2024-01-16

## 2024-01-16 RX ORDER — MIDAZOLAM HYDROCHLORIDE 2 MG/2ML
1 INJECTION, SOLUTION INTRAMUSCULAR; INTRAVENOUS
Status: DISCONTINUED | OUTPATIENT
Start: 2024-01-16 | End: 2024-01-18

## 2024-01-16 RX ORDER — ROCURONIUM BROMIDE 10 MG/ML
INJECTION, SOLUTION INTRAVENOUS PRN
Status: DISCONTINUED | OUTPATIENT
Start: 2024-01-16 | End: 2024-01-16 | Stop reason: SDUPTHER

## 2024-01-16 RX ORDER — ACETAMINOPHEN 650 MG/1
650 SUPPOSITORY RECTAL EVERY 6 HOURS PRN
Status: DISCONTINUED | OUTPATIENT
Start: 2024-01-16 | End: 2024-01-16

## 2024-01-16 RX ORDER — ACETAMINOPHEN 325 MG/1
975 TABLET ORAL EVERY 6 HOURS SCHEDULED
Status: DISCONTINUED | OUTPATIENT
Start: 2024-01-16 | End: 2024-01-27 | Stop reason: HOSPADM

## 2024-01-16 RX ORDER — ETOMIDATE 2 MG/ML
INJECTION INTRAVENOUS PRN
Status: DISCONTINUED | OUTPATIENT
Start: 2024-01-16 | End: 2024-01-16 | Stop reason: SDUPTHER

## 2024-01-16 RX ORDER — LIDOCAINE 4 G/G
2 PATCH TOPICAL DAILY
Status: DISCONTINUED | OUTPATIENT
Start: 2024-01-16 | End: 2024-01-27 | Stop reason: HOSPADM

## 2024-01-16 RX ORDER — HEPARIN SODIUM 1000 [USP'U]/ML
INJECTION, SOLUTION INTRAVENOUS; SUBCUTANEOUS PRN
Status: DISCONTINUED | OUTPATIENT
Start: 2024-01-16 | End: 2024-01-16 | Stop reason: SDUPTHER

## 2024-01-16 RX ORDER — BISACODYL 10 MG
10 SUPPOSITORY, RECTAL RECTAL DAILY PRN
Status: DISCONTINUED | OUTPATIENT
Start: 2024-01-16 | End: 2024-01-27 | Stop reason: HOSPADM

## 2024-01-16 RX ORDER — FENTANYL CITRATE 50 UG/ML
INJECTION, SOLUTION INTRAMUSCULAR; INTRAVENOUS PRN
Status: DISCONTINUED | OUTPATIENT
Start: 2024-01-16 | End: 2024-01-16 | Stop reason: SDUPTHER

## 2024-01-16 RX ORDER — 0.9 % SODIUM CHLORIDE 0.9 %
INTRAVENOUS SOLUTION INTRAVENOUS PRN
Status: DISCONTINUED | OUTPATIENT
Start: 2024-01-16 | End: 2024-01-16 | Stop reason: SDUPTHER

## 2024-01-16 RX ORDER — INSULIN LISPRO 100 [IU]/ML
0-4 INJECTION, SOLUTION INTRAVENOUS; SUBCUTANEOUS NIGHTLY
Status: DISCONTINUED | OUTPATIENT
Start: 2024-01-16 | End: 2024-01-16

## 2024-01-16 RX ORDER — SODIUM CHLORIDE 0.9 % (FLUSH) 0.9 %
5-40 SYRINGE (ML) INJECTION EVERY 12 HOURS SCHEDULED
Status: DISCONTINUED | OUTPATIENT
Start: 2024-01-16 | End: 2024-01-16

## 2024-01-16 RX ORDER — ONDANSETRON 4 MG/1
4 TABLET, ORALLY DISINTEGRATING ORAL EVERY 8 HOURS PRN
Status: DISCONTINUED | OUTPATIENT
Start: 2024-01-16 | End: 2024-01-16

## 2024-01-16 RX ORDER — POTASSIUM CHLORIDE 29.8 MG/ML
20 INJECTION INTRAVENOUS PRN
Status: DISCONTINUED | OUTPATIENT
Start: 2024-01-16 | End: 2024-01-20

## 2024-01-16 RX ORDER — INSULIN LISPRO 100 [IU]/ML
1-20 INJECTION, SOLUTION INTRAVENOUS; SUBCUTANEOUS
Status: DISCONTINUED | OUTPATIENT
Start: 2024-01-17 | End: 2024-01-18

## 2024-01-16 RX ORDER — OXYCODONE HYDROCHLORIDE 5 MG/1
10 TABLET ORAL EVERY 4 HOURS PRN
Status: DISCONTINUED | OUTPATIENT
Start: 2024-01-16 | End: 2024-01-27 | Stop reason: HOSPADM

## 2024-01-16 RX ADMIN — ETOMIDATE 40 MG: 2 INJECTION, SOLUTION INTRAVENOUS at 14:19

## 2024-01-16 RX ADMIN — AMINOCAPROIC ACID 10 G/HR: 250 INJECTION, SOLUTION INTRAVENOUS at 14:19

## 2024-01-16 RX ADMIN — FENTANYL CITRATE 100 MCG: 50 INJECTION, SOLUTION INTRAMUSCULAR; INTRAVENOUS at 15:31

## 2024-01-16 RX ADMIN — Medication 4 MG: at 20:15

## 2024-01-16 RX ADMIN — MUPIROCIN: 20 OINTMENT TOPICAL at 21:39

## 2024-01-16 RX ADMIN — HEPARIN SODIUM 34000 UNITS: 1000 INJECTION, SOLUTION INTRAVENOUS; SUBCUTANEOUS at 15:52

## 2024-01-16 RX ADMIN — ONDANSETRON 4 MG: 2 INJECTION INTRAMUSCULAR; INTRAVENOUS at 19:28

## 2024-01-16 RX ADMIN — SUCCINYLCHOLINE CHLORIDE 140 MG: 20 INJECTION, SOLUTION INTRAMUSCULAR; INTRAVENOUS at 14:19

## 2024-01-16 RX ADMIN — LIDOCAINE HYDROCHLORIDE 100 MG: 20 INJECTION, SOLUTION EPIDURAL; INFILTRATION; INTRACAUDAL; PERINEURAL at 14:19

## 2024-01-16 RX ADMIN — SODIUM CHLORIDE: 4.5 INJECTION, SOLUTION INTRAVENOUS at 22:20

## 2024-01-16 RX ADMIN — MAGNESIUM SULFATE HEPTAHYDRATE 2000 MG: 40 INJECTION, SOLUTION INTRAVENOUS at 18:51

## 2024-01-16 RX ADMIN — FAMOTIDINE 20 MG: 10 INJECTION, SOLUTION INTRAVENOUS at 21:54

## 2024-01-16 RX ADMIN — CEFAZOLIN 2 G: 1 INJECTION, POWDER, FOR SOLUTION INTRAMUSCULAR; INTRAVENOUS at 17:50

## 2024-01-16 RX ADMIN — DOCUSATE SODIUM AND SENNOSIDES 1 TABLET: 8.6; 5 TABLET, FILM COATED ORAL at 21:39

## 2024-01-16 RX ADMIN — SODIUM CHLORIDE, PRESERVATIVE FREE 10 ML: 5 INJECTION INTRAVENOUS at 21:39

## 2024-01-16 RX ADMIN — PROPOFOL 20 MCG/KG/MIN: 10 INJECTION, EMULSION INTRAVENOUS at 20:24

## 2024-01-16 RX ADMIN — INSULIN LISPRO 2 UNITS: 100 INJECTION, SOLUTION INTRAVENOUS; SUBCUTANEOUS at 07:48

## 2024-01-16 RX ADMIN — SODIUM CHLORIDE, SODIUM LACTATE, POTASSIUM CHLORIDE, CALCIUM CHLORIDE: 600; 310; 30; 20 INJECTION, SOLUTION INTRAVENOUS at 14:13

## 2024-01-16 RX ADMIN — SODIUM CHLORIDE, POTASSIUM CHLORIDE, SODIUM LACTATE AND CALCIUM CHLORIDE: 600; 310; 30; 20 INJECTION, SOLUTION INTRAVENOUS at 14:13

## 2024-01-16 RX ADMIN — SODIUM CHLORIDE 500 ML: 9 INJECTION, SOLUTION INTRAVENOUS at 20:15

## 2024-01-16 RX ADMIN — MIDAZOLAM HYDROCHLORIDE 2 MG: 1 INJECTION, SOLUTION INTRAMUSCULAR; INTRAVENOUS at 17:28

## 2024-01-16 RX ADMIN — AMLODIPINE BESYLATE 10 MG: 5 TABLET ORAL at 07:49

## 2024-01-16 RX ADMIN — NITROGLYCERIN 5 MCG/MIN: 200 INJECTION, SOLUTION INTRAVENOUS at 06:21

## 2024-01-16 RX ADMIN — DESMOPRESSIN ACETATE 26 MCG: 4 INJECTION, SOLUTION INTRAVENOUS; SUBCUTANEOUS at 18:32

## 2024-01-16 RX ADMIN — PROPOFOL 30 MG: 10 INJECTION, EMULSION INTRAVENOUS at 14:41

## 2024-01-16 RX ADMIN — ROCURONIUM BROMIDE 20 MG: 10 INJECTION INTRAVENOUS at 17:31

## 2024-01-16 RX ADMIN — CEFAZOLIN 2 G: 1 INJECTION, POWDER, FOR SOLUTION INTRAMUSCULAR; INTRAVENOUS at 14:50

## 2024-01-16 RX ADMIN — ACETAMINOPHEN 975 MG: 325 TABLET ORAL at 21:54

## 2024-01-16 RX ADMIN — ONDANSETRON 4 MG: 2 INJECTION INTRAMUSCULAR; INTRAVENOUS at 02:19

## 2024-01-16 RX ADMIN — PROTAMINE SULFATE 300 MG: 10 INJECTION, SOLUTION INTRAVENOUS at 18:25

## 2024-01-16 RX ADMIN — LISINOPRIL 2.5 MG: 5 TABLET ORAL at 07:49

## 2024-01-16 RX ADMIN — NITROGLYCERIN 100 MCG: 200 INJECTION, SOLUTION INTRAVENOUS at 15:21

## 2024-01-16 RX ADMIN — NITROGLYCERIN 0.5 INCH: 20 OINTMENT TOPICAL at 02:29

## 2024-01-16 RX ADMIN — FENTANYL CITRATE 100 MCG: 50 INJECTION, SOLUTION INTRAMUSCULAR; INTRAVENOUS at 17:28

## 2024-01-16 RX ADMIN — FENTANYL CITRATE 150 MCG: 50 INJECTION, SOLUTION INTRAMUSCULAR; INTRAVENOUS at 15:17

## 2024-01-16 RX ADMIN — SODIUM CHLORIDE 3 UNITS/HR: 9 INJECTION, SOLUTION INTRAVENOUS at 14:47

## 2024-01-16 RX ADMIN — METOPROLOL TARTRATE 2.5 MG: 5 INJECTION INTRAVENOUS at 09:00

## 2024-01-16 RX ADMIN — DEXMEDETOMIDINE HYDROCHLORIDE 0.4 MCG/KG/HR: 100 INJECTION, SOLUTION, CONCENTRATE INTRAVENOUS at 14:40

## 2024-01-16 RX ADMIN — ALBUMIN (HUMAN) 12.5 G: 12.5 INJECTION, SOLUTION INTRAVENOUS at 21:17

## 2024-01-16 RX ADMIN — SODIUM CHLORIDE 1000 ML: 9 INJECTION, SOLUTION INTRAVENOUS at 06:10

## 2024-01-16 RX ADMIN — FENTANYL CITRATE 100 MCG: 50 INJECTION, SOLUTION INTRAMUSCULAR; INTRAVENOUS at 14:55

## 2024-01-16 RX ADMIN — ROCURONIUM BROMIDE 40 MG: 10 INJECTION INTRAVENOUS at 15:56

## 2024-01-16 RX ADMIN — PHENYLEPHRINE HYDROCHLORIDE 10 MCG/MIN: 10 INJECTION INTRAVENOUS at 19:42

## 2024-01-16 RX ADMIN — ONDANSETRON 4 MG: 2 INJECTION INTRAMUSCULAR; INTRAVENOUS at 06:13

## 2024-01-16 RX ADMIN — METOPROLOL SUCCINATE 50 MG: 50 TABLET, EXTENDED RELEASE ORAL at 07:49

## 2024-01-16 RX ADMIN — PHENYLEPHRINE HYDROCHLORIDE 25 MCG/MIN: 10 INJECTION INTRAVENOUS at 14:19

## 2024-01-16 RX ADMIN — PROPOFOL 50 MG: 10 INJECTION, EMULSION INTRAVENOUS at 14:21

## 2024-01-16 RX ADMIN — CHLORHEXIDINE GLUCONATE, 0.12% ORAL RINSE 15 ML: 1.2 SOLUTION DENTAL at 21:39

## 2024-01-16 RX ADMIN — INSULIN LISPRO 2 UNITS: 100 INJECTION, SOLUTION INTRAVENOUS; SUBCUTANEOUS at 13:27

## 2024-01-16 RX ADMIN — NITROGLYCERIN 100 MCG: 200 INJECTION, SOLUTION INTRAVENOUS at 15:23

## 2024-01-16 RX ADMIN — HEPARIN SODIUM AND DEXTROSE 13.7 UNITS/KG/HR: 10000; 5 INJECTION INTRAVENOUS at 07:58

## 2024-01-16 RX ADMIN — OXYCODONE 10 MG: 5 TABLET ORAL at 22:40

## 2024-01-16 RX ADMIN — INSULIN GLARGINE 10 UNITS: 100 INJECTION, SOLUTION SUBCUTANEOUS at 07:48

## 2024-01-16 RX ADMIN — ALBUMIN (HUMAN) 12.5 G: 12.5 INJECTION, SOLUTION INTRAVENOUS at 19:42

## 2024-01-16 RX ADMIN — PROPOFOL 20 MG: 10 INJECTION, EMULSION INTRAVENOUS at 15:18

## 2024-01-16 RX ADMIN — ROCURONIUM BROMIDE 60 MG: 10 INJECTION INTRAVENOUS at 14:28

## 2024-01-16 RX ADMIN — GLYCOPYRROLATE 0.6 MG: 0.2 INJECTION INTRAMUSCULAR; INTRAVENOUS at 20:15

## 2024-01-16 RX ADMIN — SODIUM CHLORIDE 1000 ML: 9 INJECTION, SOLUTION INTRAVENOUS at 15:30

## 2024-01-16 RX ADMIN — SODIUM CHLORIDE: 9 INJECTION, SOLUTION INTRAVENOUS at 21:45

## 2024-01-16 RX ADMIN — ASPIRIN 81 MG: 81 TABLET, COATED ORAL at 07:49

## 2024-01-16 RX ADMIN — CLOPIDOGREL BISULFATE 75 MG: 75 TABLET ORAL at 07:49

## 2024-01-16 ASSESSMENT — PAIN - FUNCTIONAL ASSESSMENT: PAIN_FUNCTIONAL_ASSESSMENT: 0-10

## 2024-01-16 ASSESSMENT — PAIN SCALES - GENERAL
PAINLEVEL_OUTOF10: 3
PAINLEVEL_OUTOF10: 6
PAINLEVEL_OUTOF10: 0
PAINLEVEL_OUTOF10: 2
PAINLEVEL_OUTOF10: 3
PAINLEVEL_OUTOF10: 3

## 2024-01-16 ASSESSMENT — PAIN DESCRIPTION - LOCATION
LOCATION: CHEST

## 2024-01-16 ASSESSMENT — PULMONARY FUNCTION TESTS: PIF_VALUE: 17

## 2024-01-16 NOTE — ED NOTES
Anti xa resulted 0.27; ANDRES Hayes RN and this RN consulted pharmacy prior to adjusting heparin gtt.

## 2024-01-16 NOTE — CONSULTS
deterioration, which requires the highest level of preparedness to intervene urgently. I participated in the decision-making and personally managed or directed the management of the following life and organ supporting interventions that required my frequent assessment to treat or prevent imminent deterioration.      Lucy Hooker MD   [facility] [place] [facility]  [phone]  2024        Review of systems:     Review of Systems   Negative except chest pain     Objective:     Vital Signs:  BP (!) 156/79   Pulse 67   Temp 98.5 °F (36.9 °C) (Oral)   Resp 17   Ht 1.829 m (6')   Wt 84.4 kg (186 lb 1.1 oz)   SpO2 96%   BMI 25.24 kg/m²      Temp (24hrs), Av.3 °F (36.8 °C), Min:98.1 °F (36.7 °C), Max:98.5 °F (36.9 °C)           Intake/Output:     Intake/Output Summary (Last 24 hours) at 2024 1340  Last data filed at 2024 1210  Gross per 24 hour   Intake 1000 ml   Output 20 ml   Net 980 ml       Physical Exam  HENT:      Head: Normocephalic.   Eyes:      Pupils: Pupils are equal, round, and reactive to light.   Cardiovascular:      Rate and Rhythm: Normal rate and regular rhythm.   Abdominal:      General: Abdomen is flat.      Palpations: Abdomen is soft.   Musculoskeletal:      Right lower leg: No edema.      Left lower leg: No edema.   Neurological:      Mental Status: He is alert and oriented to person, place, and time.         Past Medical History:        has a past medical history of CAD (coronary artery disease), native coronary artery, Diabetes (HCC), Hernia of abdominal wall, Hypercholesterolemia, Hypertension, and MI, old.    Past Surgical History:      has a past surgical history that includes hernia repair (); Coronary angioplasty with stent (); and Percutaneous Transluminal Coronary Angio (2021).      Home Medications:     Prior to Admission medications    Medication Sig Start Date End Date Taking? Authorizing Provider   metFORMIN (GLUCOPHAGE) 1000 MG tablet Take 1 tablet by      Tobacco Use    Smoking status: Former     Current packs/day: 0.00     Types: Cigarettes     Quit date: 1985     Years since quittin.5    Smokeless tobacco: Never   Substance Use Topics    Alcohol use: No      Family History   Problem Relation Age of Onset    No Known Problems Mother     Cancer Brother     Heart Disease Father        LABS AND  DATA:   Reviewed    Peak airway pressure:      Minute ventilation:

## 2024-01-16 NOTE — CONSULTS
Cardiac Surgery  Consult    Subjective:      Nicola Draper is a 78 y.o. male who was referred for cardiac evaluation by Dr. Anderson for NSTEMI. He has a history of CAD/MI s/p PCI  and , HTN, HLD, DM type II, CKD stage III, hypercalcemia    He went to [facility] with CP, he took nitro before arrival without relief. He was transferred to [facility] with NSTEMI. He continued to complain of CP and was taken to the cath lab. Cardiac surgery was urgently consulted dt continued CP on cath lab table. Pt is on plavix -last dose this morning.     No hx of BPH but he does report having \"trouble passing urine\".    Pt lives with his girlfriend. He is retired. He gets around independently without assist devices. He is a former smoker -quit in , denies alcohol, and denies illicit drug use. Pt has a significant family history of heart disease.  He's had the COVID-19 vaccines.    Cardiac Testing    Cardiac catheterization: Severe three vessel coronary artery disease with severe diffuse LAD disease from prox-mid segment with KATIE 2-3 flow, faint collaterals from RCA   70% ISR of proximal Lcx, Diffuse disease of L-PL branch    of RCA  Normal LVEF, mild mid anterior wall hypokinesis   Normal LVEDP  Mildly Dilated ascending aorta   Successful placement of IABP for chest pain     ECHO: pending    Past Medical History:   Diagnosis Date    CAD (coronary artery disease), native coronary artery     MI with RCA stent . NSTEMI with L circumflex stent     Diabetes (HCC)     Hernia of abdominal wall     Hypercholesterolemia     Hypertension     MI, old      Past Surgical History:   Procedure Laterality Date    CORONARY ANGIOPLASTY WITH STENT PLACEMENT      HERNIA REPAIR  2012    PTCA  2021    ANTOINE L Circumflex artery Dr. Kelly      Social History     Tobacco Use    Smoking status: Former     Current packs/day: 0.00     Types: Cigarettes     Quit date: 1985     Years since quittin.5    Smokeless  tobacco: Never   Substance Use Topics    Alcohol use: No      Family History   Problem Relation Age of Onset    No Known Problems Mother     Cancer Brother     Heart Disease Father      Prior to Admission medications    Medication Sig Start Date End Date Taking? Authorizing Provider   metFORMIN (GLUCOPHAGE) 1000 MG tablet Take 1 tablet by mouth 2 times daily (with meals)   Yes Franc Chatterjee MD   blood glucose test strips (ASCENSIA AUTODISC VI;ONE TOUCH ULTRA TEST VI) strip 1 each by In Vitro route daily As needed.   Yes Franc Chatterjee MD   Glucose Blood (ACCU-CHEK PAO PLUS VI)    Yes Franc Chatterjee MD   blood glucose test strips (EXACTECH TEST) strip Check Glucose once a day for DM E11.9 8/6/20  Yes Franc Chatterjee MD   glucose monitoring kit Check Glucose once a day in am fasting and occ 2 h after a meal for DM E11.9 7/17/18  Yes Franc Chatterjee MD   lovastatin (MEVACOR) 40 MG tablet Take 2 at night 10/6/23  Yes Danyell Toussaint MD   glipiZIDE (GLUCOTROL) 5 MG tablet Take 1 tablet by mouth 2 times daily (before meals) 10/6/23  Yes Danyell Toussaint MD   glucose monitoring kit 1 kit by Does not apply route daily Accu-Chek meter to Check Glucose once a day for DM E11.9 10/5/23  Yes Danyell Toussaint MD   amLODIPine (NORVASC) 10 MG tablet Take 1 tablet by mouth daily 10/5/23  Yes Danyell Toussaint MD   clopidogrel (PLAVIX) 75 MG tablet Take 1 tablet by mouth daily 10/5/23  Yes Danyell Toussaint MD   empagliflozin (JARDIANCE) 25 MG tablet Take 1 tablet by mouth daily 10/5/23  Yes Danyell Toussaint MD   lisinopril (PRINIVIL;ZESTRIL) 2.5 MG tablet Take 1 tablet by mouth daily 10/5/23  Yes Danyell Toussaint MD   metoprolol succinate (TOPROL XL) 50 MG extended release tablet Take 1 tablet by mouth daily 10/5/23  Yes Danyell Toussaint MD   Lancets MISC Check Glucose once a day for DM E11.9 8/6/20  Yes Automatic Reconciliation, Ar   aspirin 81 MG EC tablet

## 2024-01-16 NOTE — PROGRESS NOTES
Patient was assigned to be me this morning. I did not get chance to see patient was patient was in cath lab when I went to see patient. Dr. Adnerson later perfect serve me that he is going to ICU. I called and notified ICU team about the patient.

## 2024-01-16 NOTE — PROGRESS NOTES
Brief Procedure Note:         Indication: NSTEMI      Procedure:   LHC, Cors   LV gram, aortogram   IABP placement     Complications: None   Blood loss: Minimal   Condition: Stable     Brief procedure Result:   Severe three vessel coronary artery disease with severe diffuse LAD disease from prox-mid segment with KATIE 2-3 flow, faint collaterals from RCA   70% ISR of proximal Lcx, Diffuse disease of L-PL branch    of RCA  Normal LVEF, mild mid anterior wall hypokinesis   Normal LVEDP  Successful placement of IABP for chest pain     Recommendations:   Mr Draper has ongoing chest pain and left heart catheterization showed three-vessel coronary artery disease.  There was KATIE II to KATIE-3 flow, with faint collateral developing from RCA.  LAD disease is diffuse from proximal to mid segment, there is also  of RCA and ISR of LCx.  With diabetes he may benefit from CABG.  Discussed with CT surgery team.  Plavix was held this morning, however, unfortunately he already got it before it was discontinued. Check P2Y12 assay.     Continue aspirin and heparin drip  Continue balloon pump and nitroglycerin drip for chest pain  Continue metoprolol, continue lovastatin  Check 2D echocardiogram  CABG evaluation with CT surgery    Full note and recommendations to follow. '

## 2024-01-16 NOTE — ED NOTES
RN to bedside, Nitro paste patch removed from left chest and skin cleansed in preparation for Nitro drip infusion initiation

## 2024-01-16 NOTE — ED TRIAGE NOTES
Pt is a transfer from [facility] for chest pain NSTEMI on Heparin Drip at 13.2u/kg/hr

## 2024-01-16 NOTE — PERIOP NOTE
Beta Blocker given at 0749 on 01/16/2024  Antibiotics given at 1450 on 01/16/2024    1600 - Family updated. Spoke with Michelle.    1730 - Notified CVICU of rewarming status. Spoke with Margoth.    1800 - Family updated. Spoke with Michelle.    1840 - Nursing handoff given to CVICU.    TRANSFER - OUT REPORT:    Verbal report given to ANGELA Cleveland RN on Nicola Draper  being transferred to CVICU for routine post-op       Report consisted of patient's Situation, Background, Assessment and   Recommendations(SBAR).     Information from the following report(s) Nurse Handoff Report, Surgery Report, Procedure Verification, and Quality Measures was reviewed with the receiving nurse.           Lines:   CVC Double Lumen 01/16/24 (Active)       Pulmonary Artery Cath Single 01/16/24 (Active)       Peripheral IV 01/15/24 Left Antecubital (Active)   Site Assessment Clean, dry & intact 01/16/24 1300   Line Status Infusing 01/16/24 1300   Line Care Connections checked and tightened 01/16/24 1300   Phlebitis Assessment No symptoms 01/16/24 1300   Infiltration Assessment 0 01/16/24 1300   Alcohol Cap Used Yes 01/16/24 1300   Dressing Status Clean, dry & intact 01/16/24 1300   Dressing Type Transparent 01/16/24 1300   Dressing Intervention New 01/15/24 1248       Peripheral IV 01/15/24 Left;Posterior Hand (Active)   Site Assessment Clean, dry & intact 01/16/24 1300   Line Status Capped;Flushed 01/16/24 1300   Line Care Cap changed 01/16/24 1300   Phlebitis Assessment No symptoms 01/16/24 1300   Infiltration Assessment 0 01/16/24 1300   Alcohol Cap Used Yes 01/16/24 1300   Dressing Status Clean, dry & intact 01/16/24 1300   Dressing Type Transparent 01/16/24 1300       Peripheral IV 01/16/24 Left Forearm (Active)   Site Assessment Clean, dry & intact 01/16/24 1300   Line Status Infusing 01/16/24 1300   Line Care Connections checked and tightened 01/16/24 1300   Phlebitis Assessment No symptoms 01/16/24 1300   Infiltration Assessment 0 01/16/24

## 2024-01-16 NOTE — H&P
Hospitalist Admission Note    NAME:  Nicola Draper   :  1945   MRN:  [id number]     Date/Time:  2024 5:35 AM    Patient PCP: Danyell Toussaint MD    ______________________________________________________________________  Given the patient's current clinical presentation, I have a high level of concern for decompensation if discharged from the emergency department.  Complex decision making was performed, which includes reviewing the patient's available past medical records, laboratory results, and x-ray films.       My assessment of this patient's clinical condition and my plan of care is as follows.    Assessment / Plan:    Active Problems:  NSTEMI  CAD status post ANTOINE  Essential hypertension  Hyperlipidemia  Non-insulin-dependent diabetes mellitus    Plan:  NSTEMI  CAD status post ANTOINE  Essential hypertension  Hyperlipidemia  Admit to stepdown unit  Cardiology consulted, greatly appreciate their expertise  Obtain TTE  Continue ACS heparin  Start nitro drip  Morphine as needed pain  Zofran as needed nausea  Labs: Lipids, hemoglobin A1c  Continue PTA: Amlodipine, aspirin, Plavix, lisinopril, lovastatin, metoprolol  Repeat EKG  Keep n.p.o. in anticipation of [facility]  Trend troponins    Non-insulin-dependent diabetes mellitus  Start glargine 10 units nightly  Corrective coverage insulin  Accu-Cheks  Diabetic diet after n.p.o.  Hypoglycemia protocol in place  Hold PTA Jardiance in setting of dehydration  -1 L fluid bolus ordered    Medical Decision Making:   I personally reviewed labs: Yes, as listed below  I personally reviewed imaging: CXR from OSH  Toxic drug monitoring: Heparin, nitroglycerin  Discussed case with: ED provider. After discussion I am in agreement that acuity of patient's medical condition necessitates hospital stay.      Code Status: Full  DVT Prophylaxis: Heparin  GI Prophylaxis: Not indicated  Baseline: Independent    Subjective:   CHIEF COMPLAINT: NSTEMI/transfer    HISTORY OF  Danyell Toussaint MD   clopidogrel (PLAVIX) 75 MG tablet Take 1 tablet by mouth daily 10/5/23  Yes Danyell Toussaint MD   empagliflozin (JARDIANCE) 25 MG tablet Take 1 tablet by mouth daily 10/5/23  Yes Danyell Toussaint MD   lisinopril (PRINIVIL;ZESTRIL) 2.5 MG tablet Take 1 tablet by mouth daily 10/5/23  Yes Danyell Toussaint MD   metoprolol succinate (TOPROL XL) 50 MG extended release tablet Take 1 tablet by mouth daily 10/5/23  Yes Danyell Toussaint MD   Lancets MISC Check Glucose once a day for DM E11.9 20  Yes Automatic Reconciliation, Ar   aspirin 81 MG EC tablet 1 tablet 11  Yes Automatic Reconciliation, Ar   nitroGLYCERIN (NITROSTAT) 0.4 MG SL tablet Place 1 tablet under the tongue 18  Yes Automatic Reconciliation, Ar         Objective:   VITALS:    Patient Vitals for the past 24 hrs:   BP Temp Temp src Pulse Resp SpO2 Height Weight   24 0515 (!) 167/107 98.1 °F (36.7 °C) Oral 93 17 96 % 1.829 m (6') 84.4 kg (186 lb 1.1 oz)       Temp (24hrs), Av.2 °F (36.8 °C), Min:98.1 °F (36.7 °C), Max:98.2 °F (36.8 °C)           Wt Readings from Last 12 Encounters:   24 84.4 kg (186 lb 1.1 oz)   01/15/24 84.4 kg (186 lb)   10/05/23 83.9 kg (185 lb)   23 88.9 kg (196 lb)   22 85.6 kg (188 lb 12.8 oz)   22 87.1 kg (192 lb)   10/20/22 88.5 kg (195 lb)   22 90.7 kg (200 lb)   21 95.7 kg (211 lb)         PHYSICAL EXAM:  General:   See   HEENT:   Atraumatic, anicteric sclerae, pink conjunctivae, mucosa moist, dentition fair     Neck:   Supple, symmetrical, trachea midline     Lungs:   CTAB. Symmetric expansion. Good aeration. Normal respiratory effort.     Chest wall:   No tenderness. No Accessory muscle use.     Cardiovascular:   Regular rate and rhythm, no murmur rub gallop, no JVD. Radial/AT/DP pulses 2+     GI/:    Soft, non-tender. Not distended.  Bowel sounds normal. No HSM/Masses      Extremities  No edema. No cyanosis.      Skin:  No

## 2024-01-16 NOTE — PROGRESS NOTES
Received patient to room 2403 post cardiac cath with IABP in place. IABP in right groin, 1;1 with EKG trigger. Groin site clean, dry and intact. Right radial site with TR band with 13cc of air in cuff, site under TR band intact. Good capillary refill right fingers. Patient reports chest discomfort to be better than before, rating chest pain as a 2-3. Nitro drip infusing at 30mcg/min.   1300 Corona NP in to see patient. Patient agreeable to emergent CABG today.  1309 Dr. Maharaj in to see patient. Patients family at bedside.   1405 Anesthesia team at bedside,patient to OR at this time.

## 2024-01-16 NOTE — ED PROVIDER NOTES
Patient transferred from [facility] with an NSTEMI.      He was seen immediately by the hospitalist on arrival to the ED.    No further treatment needed by the ED provider at this time.     Med Stoll DO  01/16/24 0522

## 2024-01-16 NOTE — PROGRESS NOTES
Pre-op vascular studies attempted; patient going for emergent CABG and cannot be done.    Marisol Medley T  Vascular Lab

## 2024-01-16 NOTE — PROGRESS NOTES
Spoke with Bashir at [facility] and set up transportation to [facility] ED for patient. Advises on Heparin drip and cardiac monitor will be ALS. ETA will be 5110. Notified ED staff

## 2024-01-16 NOTE — CONSULTS
IP Cardiology Consult       Date of consult:  01/16/24  Date of admission: 1/16/2024  Primary Cardiologist:  JACQUI Anderson   Physician Requesting consult: Dr Ellis       Assessment:    Problem list:   NSTEMI  History of CAD and PCI, last left heart cath in 2021 showed  of RCA and severe LCx disease s/p PCI  Diabetes type 2  Hypertension  Hyperlipidemia  Renal insufficiency    Lives with girlfriend, has 5 children from previous marriage, retired  Follows with Dr. Kelly, last seen a year ago       Recommendations:    Continue aspirin and heparin drip  I will hold Plavix morning dose  Continue nitroglycerin drip, will increase drip rate, will also give him IV metoprolol, trend enzymes and repeat EKG  Continue lovastatin (did not tolerate Lipitor, Crestor)  Continue metoprolol  IV hydration  Check 2D echocardiogram    will reassess symptoms if he is chest pain-free then will plan for left heart catheterization later today or tomorrow, however if he continues to have chest pain will need it sooner     Thank you for this consult and allowing me to take part in this patients care.  Please call with questions.        [x]        High complexity decision making was performed      CC / Reason for consult: NSTEMI     History of the presenting illness:  Nicola Draper is a 78 y.o. male with past medical history of CAD and PCI, hypertension, hyperlipidemia, diabetes presented to emergency room with chest pain.  He started having chest pain Monday morning that was improved with nitroglycerin.  He had recurrent episodes of chest pain for which he took another nitroglycerin with improvement in the pain but did not resolve completely.  He reports pain is burning with no radiation.  He went to [facility].  He was diagnosed with NSTEMI with troponin in 200 range.  He was placed on heparin drip and Nitropaste and transferred to [facility].    His pain is improved but still reports

## 2024-01-16 NOTE — CARDIO/PULMONARY
Chart reviewed: Patient is 78 y.o. male admitted with Chest pain [R07.9]  NSTEMI (non-ST elevated myocardial infarction) (HCC) [I21.4]    Cardiac cath 1/16/24: CTS    Emergent CABG today. CP Rehab following.    Keshia Wang RN

## 2024-01-16 NOTE — ANESTHESIA PRE PROCEDURE
Department of Anesthesiology  Preprocedure Note       Name:  Nicola Draper   Age:  78 y.o.  :  1945                                          MRN:  [id number]         Date:  2024      Surgeon: Surgeon(s):  Nguyễn Maharaj MD    Procedure: Procedure(s):  ON PUMP CORONARY ARTERY BYPASS (CABG) WITH ENDOSCOPIC VEIN HARVEST (EVH) AND CARDIOPLEGIA (NO PAC). INTRAOPERATIVE MOOKIE DONE BY DR. GRAJEDA. EVH DONE BY ANDRES TORRES.    Medications prior to admission:   Prior to Admission medications    Medication Sig Start Date End Date Taking? Authorizing Provider   metFORMIN (GLUCOPHAGE) 1000 MG tablet Take 1 tablet by mouth 2 times daily (with meals)   Yes Franc Chatterjee MD   blood glucose test strips (ASCENSIA AUTODISC VI;ONE TOUCH ULTRA TEST VI) strip 1 each by In Vitro route daily As needed.   Yes Franc Chatterjee MD   Glucose Blood (ACCU-CHEK PAO PLUS VI)    Yes Franc Chatterjee MD   blood glucose test strips (EXACTECH TEST) strip Check Glucose once a day for DM E11.9 20  Yes Franc Chatterjee MD   glucose monitoring kit Check Glucose once a day in am fasting and occ 2 h after a meal for DM E11.9 18  Yes Franc Chatterjee MD   lovastatin (MEVACOR) 40 MG tablet Take 2 at night 10/6/23  Yes Danyell Toussaint MD   glipiZIDE (GLUCOTROL) 5 MG tablet Take 1 tablet by mouth 2 times daily (before meals) 10/6/23  Yes Danyell Toussaint MD   glucose monitoring kit 1 kit by Does not apply route daily Accu-Chek meter to Check Glucose once a day for DM E11.9 10/5/23  Yes Danyell Toussaint MD   amLODIPine (NORVASC) 10 MG tablet Take 1 tablet by mouth daily 10/5/23  Yes Danyell Toussaint MD   clopidogrel (PLAVIX) 75 MG tablet Take 1 tablet by mouth daily 10/5/23  Yes Danyell Toussaint MD   empagliflozin (JARDIANCE) 25 MG tablet Take 1 tablet by mouth daily 10/5/23  Yes Danyell Toussaint MD   lisinopril (PRINIVIL;ZESTRIL) 2.5 MG tablet Take 1 tablet by mouth daily 10/5/23

## 2024-01-17 ENCOUNTER — APPOINTMENT (OUTPATIENT)
Facility: HOSPITAL | Age: 79
DRG: 233 | End: 2024-01-17
Payer: MEDICARE

## 2024-01-17 PROBLEM — E11.65 TYPE 2 DIABETES MELLITUS WITH HYPERGLYCEMIA, WITHOUT LONG-TERM CURRENT USE OF INSULIN (HCC): Status: ACTIVE | Noted: 2018-07-31

## 2024-01-17 PROBLEM — R73.9 STRESS HYPERGLYCEMIA: Status: ACTIVE | Noted: 2024-01-17

## 2024-01-17 LAB
ACUTE KIDNEY INJURY RISK NEPHROCHECK: <0.1 (ref 0–0.3)
ACUTE KIDNEY INJURY RISK NEPHROCHECK: <0.1 (ref 0–0.3)
ALBUMIN SERPL-MCNC: 3.1 G/DL (ref 3.5–5)
ALBUMIN/GLOB SERPL: 1.6 (ref 1.1–2.2)
ALP SERPL-CCNC: 41 U/L (ref 45–117)
ALT SERPL-CCNC: 12 U/L (ref 12–78)
ANION GAP SERPL CALC-SCNC: 2 MMOL/L (ref 5–15)
ANION GAP SERPL CALC-SCNC: 4 MMOL/L (ref 5–15)
ARTERIAL PATENCY WRIST A: ABNORMAL
AST SERPL-CCNC: 32 U/L (ref 15–37)
BASE DEFICIT BLDA-SCNC: 5 MMOL/L
BDY SITE: ABNORMAL
BILIRUB SERPL-MCNC: 1.3 MG/DL (ref 0.2–1)
BLD PROD TYP BPU: NORMAL
BLOOD BANK DISPENSE STATUS: NORMAL
BPU ID: NORMAL
BUN SERPL-MCNC: 18 MG/DL (ref 6–20)
BUN SERPL-MCNC: 20 MG/DL (ref 6–20)
BUN/CREAT SERPL: 12 (ref 12–20)
BUN/CREAT SERPL: 13 (ref 12–20)
CALCIUM SERPL-MCNC: 10 MG/DL (ref 8.5–10.1)
CALCIUM SERPL-MCNC: 9.3 MG/DL (ref 8.5–10.1)
CHLORIDE SERPL-SCNC: 115 MMOL/L (ref 97–108)
CHLORIDE SERPL-SCNC: 116 MMOL/L (ref 97–108)
CO2 SERPL-SCNC: 24 MMOL/L (ref 21–32)
CO2 SERPL-SCNC: 24 MMOL/L (ref 21–32)
CREAT SERPL-MCNC: 1.5 MG/DL (ref 0.7–1.3)
CREAT SERPL-MCNC: 1.54 MG/DL (ref 0.7–1.3)
EKG ATRIAL RATE: 64 BPM
EKG DIAGNOSIS: NORMAL
EKG DIAGNOSIS: NORMAL
EKG P AXIS: 107 DEGREES
EKG P-R INTERVAL: 198 MS
EKG Q-T INTERVAL: 448 MS
EKG Q-T INTERVAL: 464 MS
EKG QRS DURATION: 90 MS
EKG QRS DURATION: 92 MS
EKG QTC CALCULATION (BAZETT): 458 MS
EKG QTC CALCULATION (BAZETT): 478 MS
EKG R AXIS: -54 DEGREES
EKG R AXIS: 227 DEGREES
EKG T AXIS: -73 DEGREES
EKG T AXIS: 166 DEGREES
EKG VENTRICULAR RATE: 63 BPM
EKG VENTRICULAR RATE: 64 BPM
ERYTHROCYTE [DISTWIDTH] IN BLOOD BY AUTOMATED COUNT: 13 % (ref 11.5–14.5)
ERYTHROCYTE [DISTWIDTH] IN BLOOD BY AUTOMATED COUNT: 13.2 % (ref 11.5–14.5)
FIO2 ON VENT: 40 %
GLOBULIN SER CALC-MCNC: 1.9 G/DL (ref 2–4)
GLUCOSE BLD STRIP.AUTO-MCNC: 101 MG/DL (ref 65–117)
GLUCOSE BLD STRIP.AUTO-MCNC: 111 MG/DL (ref 65–117)
GLUCOSE BLD STRIP.AUTO-MCNC: 111 MG/DL (ref 65–117)
GLUCOSE BLD STRIP.AUTO-MCNC: 115 MG/DL (ref 65–117)
GLUCOSE BLD STRIP.AUTO-MCNC: 115 MG/DL (ref 65–117)
GLUCOSE BLD STRIP.AUTO-MCNC: 116 MG/DL (ref 65–117)
GLUCOSE BLD STRIP.AUTO-MCNC: 118 MG/DL (ref 65–117)
GLUCOSE BLD STRIP.AUTO-MCNC: 119 MG/DL (ref 65–117)
GLUCOSE BLD STRIP.AUTO-MCNC: 121 MG/DL (ref 65–117)
GLUCOSE BLD STRIP.AUTO-MCNC: 124 MG/DL (ref 65–117)
GLUCOSE BLD STRIP.AUTO-MCNC: 127 MG/DL (ref 65–117)
GLUCOSE BLD STRIP.AUTO-MCNC: 132 MG/DL (ref 65–117)
GLUCOSE BLD STRIP.AUTO-MCNC: 133 MG/DL (ref 65–117)
GLUCOSE BLD STRIP.AUTO-MCNC: 149 MG/DL (ref 65–117)
GLUCOSE BLD STRIP.AUTO-MCNC: 178 MG/DL (ref 65–117)
GLUCOSE BLD STRIP.AUTO-MCNC: 193 MG/DL (ref 65–117)
GLUCOSE BLD STRIP.AUTO-MCNC: 88 MG/DL (ref 65–117)
GLUCOSE BLD STRIP.AUTO-MCNC: 93 MG/DL (ref 65–117)
GLUCOSE BLD STRIP.AUTO-MCNC: 95 MG/DL (ref 65–117)
GLUCOSE BLD STRIP.AUTO-MCNC: 98 MG/DL (ref 65–117)
GLUCOSE BLD STRIP.AUTO-MCNC: 99 MG/DL (ref 65–117)
GLUCOSE SERPL-MCNC: 105 MG/DL (ref 65–100)
GLUCOSE SERPL-MCNC: 202 MG/DL (ref 65–100)
HCO3 BLDA-SCNC: 20 MMOL/L (ref 22–26)
HCT VFR BLD AUTO: 31.1 % (ref 36.6–50.3)
HCT VFR BLD AUTO: 33.7 % (ref 36.6–50.3)
HCT VFR BLD AUTO: 33.8 % (ref 36.6–50.3)
HGB BLD-MCNC: 10.3 G/DL (ref 12.1–17)
HGB BLD-MCNC: 11.2 G/DL (ref 12.1–17)
HGB BLD-MCNC: 11.2 G/DL (ref 12.1–17)
LACTATE SERPL-SCNC: 1.1 MMOL/L (ref 0.4–2)
MAGNESIUM SERPL-MCNC: 3 MG/DL (ref 1.6–2.4)
MCH RBC QN AUTO: 29.8 PG (ref 26–34)
MCH RBC QN AUTO: 30 PG (ref 26–34)
MCHC RBC AUTO-ENTMCNC: 33.1 G/DL (ref 30–36.5)
MCHC RBC AUTO-ENTMCNC: 33.2 G/DL (ref 30–36.5)
MCV RBC AUTO: 89.9 FL (ref 80–99)
MCV RBC AUTO: 90.3 FL (ref 80–99)
NRBC # BLD: 0 K/UL (ref 0–0.01)
NRBC # BLD: 0 K/UL (ref 0–0.01)
NRBC BLD-RTO: 0 PER 100 WBC
NRBC BLD-RTO: 0 PER 100 WBC
P2Y12 PLT RESPONSE: 298 PRU (ref 194–418)
PCO2 BLDA: 39 MMHG (ref 35–45)
PEEP RESPIRATORY: 5
PH BLDA: 7.34 (ref 7.35–7.45)
PLATELET # BLD AUTO: 126 K/UL (ref 150–400)
PLATELET # BLD AUTO: 132 K/UL (ref 150–400)
PMV BLD AUTO: 9.4 FL (ref 8.9–12.9)
PMV BLD AUTO: 9.8 FL (ref 8.9–12.9)
PO2 BLDA: 80 MMHG (ref 80–100)
POTASSIUM SERPL-SCNC: 4.2 MMOL/L (ref 3.5–5.1)
POTASSIUM SERPL-SCNC: 4.4 MMOL/L (ref 3.5–5.1)
PRESSURE SUPPORT SETTING VENT: 5
PROT SERPL-MCNC: 5 G/DL (ref 6.4–8.2)
RBC # BLD AUTO: 3.73 M/UL (ref 4.1–5.7)
RBC # BLD AUTO: 3.76 M/UL (ref 4.1–5.7)
SAO2 % BLD: 95 % (ref 92–97)
SAO2% DEVICE SAO2% SENSOR NAME: ABNORMAL
SERVICE CMNT-IMP: ABNORMAL
SERVICE CMNT-IMP: NORMAL
SODIUM SERPL-SCNC: 141 MMOL/L (ref 136–145)
SODIUM SERPL-SCNC: 144 MMOL/L (ref 136–145)
SPECIMEN SITE: ABNORMAL
UNIT DIVISION: 0
WBC # BLD AUTO: 7.1 K/UL (ref 4.1–11.1)
WBC # BLD AUTO: 7.3 K/UL (ref 4.1–11.1)

## 2024-01-17 PROCEDURE — P9073 PLATELETS PHERESIS PATH REDU: HCPCS

## 2024-01-17 PROCEDURE — 85576 BLOOD PLATELET AGGREGATION: CPT

## 2024-01-17 PROCEDURE — 99231 SBSQ HOSP IP/OBS SF/LOW 25: CPT | Performed by: CLINICAL NURSE SPECIALIST

## 2024-01-17 PROCEDURE — P9045 ALBUMIN (HUMAN), 5%, 250 ML: HCPCS

## 2024-01-17 PROCEDURE — 71045 X-RAY EXAM CHEST 1 VIEW: CPT

## 2024-01-17 PROCEDURE — 82962 GLUCOSE BLOOD TEST: CPT

## 2024-01-17 PROCEDURE — 37799 UNLISTED PX VASCULAR SURGERY: CPT

## 2024-01-17 PROCEDURE — 82803 BLOOD GASES ANY COMBINATION: CPT

## 2024-01-17 PROCEDURE — A4216 STERILE WATER/SALINE, 10 ML: HCPCS

## 2024-01-17 PROCEDURE — 94003 VENT MGMT INPAT SUBQ DAY: CPT

## 2024-01-17 PROCEDURE — 2500000003 HC RX 250 WO HCPCS

## 2024-01-17 PROCEDURE — 6370000000 HC RX 637 (ALT 250 FOR IP)

## 2024-01-17 PROCEDURE — 85014 HEMATOCRIT: CPT

## 2024-01-17 PROCEDURE — 6360000002 HC RX W HCPCS

## 2024-01-17 PROCEDURE — 85018 HEMOGLOBIN: CPT

## 2024-01-17 PROCEDURE — 36430 TRANSFUSION BLD/BLD COMPNT: CPT

## 2024-01-17 PROCEDURE — 2580000003 HC RX 258

## 2024-01-17 PROCEDURE — 80053 COMPREHEN METABOLIC PANEL: CPT

## 2024-01-17 PROCEDURE — 83735 ASSAY OF MAGNESIUM: CPT

## 2024-01-17 PROCEDURE — 30233R1 TRANSFUSION OF NONAUTOLOGOUS PLATELETS INTO PERIPHERAL VEIN, PERCUTANEOUS APPROACH: ICD-10-PCS | Performed by: HOSPITALIST

## 2024-01-17 PROCEDURE — 2000000000 HC ICU R&B

## 2024-01-17 PROCEDURE — 85027 COMPLETE CBC AUTOMATED: CPT

## 2024-01-17 PROCEDURE — 36415 COLL VENOUS BLD VENIPUNCTURE: CPT

## 2024-01-17 PROCEDURE — 83605 ASSAY OF LACTIC ACID: CPT

## 2024-01-17 RX ORDER — ALBUMIN, HUMAN INJ 5% 5 %
12.5 SOLUTION INTRAVENOUS ONCE
Status: COMPLETED | OUTPATIENT
Start: 2024-01-18 | End: 2024-01-18

## 2024-01-17 RX ORDER — SODIUM CHLORIDE 9 MG/ML
INJECTION, SOLUTION INTRAVENOUS PRN
Status: DISCONTINUED | OUTPATIENT
Start: 2024-01-17 | End: 2024-01-21

## 2024-01-17 RX ORDER — LANOLIN ALCOHOL/MO/W.PET/CERES
400 CREAM (GRAM) TOPICAL 2 TIMES DAILY
Status: DISCONTINUED | OUTPATIENT
Start: 2024-01-18 | End: 2024-01-24

## 2024-01-17 RX ADMIN — HYDROMORPHONE HYDROCHLORIDE 0.5 MG: 1 INJECTION, SOLUTION INTRAMUSCULAR; INTRAVENOUS; SUBCUTANEOUS at 06:15

## 2024-01-17 RX ADMIN — ACETAMINOPHEN 975 MG: 325 TABLET ORAL at 01:42

## 2024-01-17 RX ADMIN — HYDROMORPHONE HYDROCHLORIDE 0.5 MG: 1 INJECTION, SOLUTION INTRAMUSCULAR; INTRAVENOUS; SUBCUTANEOUS at 15:37

## 2024-01-17 RX ADMIN — DEXMEDETOMIDINE 0.3 MCG/KG/HR: 100 INJECTION, SOLUTION INTRAVENOUS at 04:10

## 2024-01-17 RX ADMIN — ASPIRIN 81 MG: 81 TABLET, COATED ORAL at 09:30

## 2024-01-17 RX ADMIN — MUPIROCIN: 20 OINTMENT TOPICAL at 09:29

## 2024-01-17 RX ADMIN — ACETAMINOPHEN 975 MG: 325 TABLET ORAL at 22:25

## 2024-01-17 RX ADMIN — ALBUMIN (HUMAN) 12.5 G: 12.5 INJECTION, SOLUTION INTRAVENOUS at 02:09

## 2024-01-17 RX ADMIN — SODIUM CHLORIDE, PRESERVATIVE FREE 30 ML: 5 INJECTION INTRAVENOUS at 20:15

## 2024-01-17 RX ADMIN — DOCUSATE SODIUM AND SENNOSIDES 1 TABLET: 8.6; 5 TABLET, FILM COATED ORAL at 09:29

## 2024-01-17 RX ADMIN — ACETAMINOPHEN 975 MG: 325 TABLET ORAL at 13:11

## 2024-01-17 RX ADMIN — CHLORHEXIDINE GLUCONATE, 0.12% ORAL RINSE 15 ML: 1.2 SOLUTION DENTAL at 09:29

## 2024-01-17 RX ADMIN — FAMOTIDINE 20 MG: 10 INJECTION, SOLUTION INTRAVENOUS at 09:28

## 2024-01-17 RX ADMIN — WATER 2000 MG: 1 INJECTION INTRAMUSCULAR; INTRAVENOUS; SUBCUTANEOUS at 10:08

## 2024-01-17 RX ADMIN — OXYCODONE 5 MG: 5 TABLET ORAL at 23:10

## 2024-01-17 RX ADMIN — ALBUMIN (HUMAN) 12.5 G: 12.5 INJECTION, SOLUTION INTRAVENOUS at 09:50

## 2024-01-17 RX ADMIN — SODIUM CHLORIDE, PRESERVATIVE FREE 10 ML: 5 INJECTION INTRAVENOUS at 09:19

## 2024-01-17 RX ADMIN — OXYCODONE 5 MG: 5 TABLET ORAL at 03:44

## 2024-01-17 RX ADMIN — POLYETHYLENE GLYCOL 3350 17 G: 17 POWDER, FOR SOLUTION ORAL at 09:30

## 2024-01-17 RX ADMIN — PHENYLEPHRINE HYDROCHLORIDE 30 MCG/MIN: 10 INJECTION INTRAVENOUS at 02:34

## 2024-01-17 RX ADMIN — PROPOFOL 20 MCG/KG/MIN: 10 INJECTION, EMULSION INTRAVENOUS at 09:19

## 2024-01-17 RX ADMIN — HYDROMORPHONE HYDROCHLORIDE 0.5 MG: 1 INJECTION, SOLUTION INTRAMUSCULAR; INTRAVENOUS; SUBCUTANEOUS at 18:48

## 2024-01-17 RX ADMIN — WATER 2000 MG: 1 INJECTION INTRAMUSCULAR; INTRAVENOUS; SUBCUTANEOUS at 18:04

## 2024-01-17 RX ADMIN — MUPIROCIN: 20 OINTMENT TOPICAL at 20:15

## 2024-01-17 RX ADMIN — DOCUSATE SODIUM AND SENNOSIDES 1 TABLET: 8.6; 5 TABLET, FILM COATED ORAL at 20:57

## 2024-01-17 RX ADMIN — CHLORHEXIDINE GLUCONATE, 0.12% ORAL RINSE 15 ML: 1.2 SOLUTION DENTAL at 20:57

## 2024-01-17 RX ADMIN — WATER 2000 MG: 1 INJECTION INTRAMUSCULAR; INTRAVENOUS; SUBCUTANEOUS at 01:42

## 2024-01-17 ASSESSMENT — PAIN DESCRIPTION - ORIENTATION
ORIENTATION: ANTERIOR
ORIENTATION: ANTERIOR;MID
ORIENTATION: ANTERIOR

## 2024-01-17 ASSESSMENT — PULMONARY FUNCTION TESTS
PIF_VALUE: 15
PIF_VALUE: 15
PIF_VALUE: 14
PIF_VALUE: 15

## 2024-01-17 ASSESSMENT — PAIN SCALES - GENERAL
PAINLEVEL_OUTOF10: 0
PAINLEVEL_OUTOF10: 3
PAINLEVEL_OUTOF10: 9
PAINLEVEL_OUTOF10: 0
PAINLEVEL_OUTOF10: 0
PAINLEVEL_OUTOF10: 5
PAINLEVEL_OUTOF10: 0
PAINLEVEL_OUTOF10: 9
PAINLEVEL_OUTOF10: 3

## 2024-01-17 ASSESSMENT — PAIN DESCRIPTION - DESCRIPTORS
DESCRIPTORS: ACHING;SORE
DESCRIPTORS: ACHING;SORE
DESCRIPTORS: ACHING

## 2024-01-17 ASSESSMENT — PAIN DESCRIPTION - PAIN TYPE: TYPE: SURGICAL PAIN

## 2024-01-17 ASSESSMENT — PAIN DESCRIPTION - ONSET: ONSET: ON-GOING

## 2024-01-17 ASSESSMENT — PAIN DESCRIPTION - LOCATION
LOCATION: CHEST
LOCATION: INCISION
LOCATION: CHEST

## 2024-01-17 ASSESSMENT — PAIN DESCRIPTION - FREQUENCY: FREQUENCY: CONTINUOUS

## 2024-01-17 NOTE — PROGRESS NOTES
0700 Bedside and Verbal shift change report given to Sherita RN (oncoming nurse) by Adwoa RN (offgoing nurse). Report included the following information Nurse Handoff Report, Index, Adult Overview, Intake/Output, MAR, Recent Results, and Cardiac Rhythm DDD Rate 80    0735 IABP removed by ALEIDA Partida    0745 Shift assessment completed - see flowsheets     0800 Fem stop placed on patient by ALEIDA Partida. Patient's temp is 35.7 C - Bradly hugger placed on patient      0805 Cardiac surgery team at bedside assessing patient. Per team, keep patient sedated and intubated for 6 hours. Per team, alright to wake patient up at 1400    0905 Fem stop removed by ALEIDA Partida. Groin site is soft, no hematoma present, and femoral pulse palpable and pedal pulse present via doppler     0912 Received verbal orders per ALEIDA Partida for soft right ankle restraint to keep leg immobilized      0950 CVP less than 12. PRN albumin IV given     1058 Temp 36.2 and Bradly hugger removed     1103 AKIB sample sent to lab    1122 Lactic acid and H&H sent to lab    1137 MAP less than 65 - derik gtt titrated to 15 mcg/min    1159 MAP greater than 65 - derik gtt weaned down to 10 mcg/min    1200 Reassessment completed - see flowsheets     1418 MAP greater than 65 - derik gtt weaned down to 5 mcg/min. Prop gtt stopped for SAT    1420 Right ankle restraint d/c and removed 6 hours post IABP removal     8736-0137 MAP less than 65 - derik gtt titrated up to 15 mcg/min    1432 MAP less than 65- derik gtt titrated up to 25 mcg/min    1440 MAP greater than 65 - derik gtt weaned down to 20 mcg/min    1445 MAP greater than 65 - derik gtt weaned down to 15 mcg/min    1450 MD Lucy at bedside assessing patient. Per MD, patient now on SBT and received verbal orders for ABG in 30 minutes.    1537 PRN dilaudid IV given     1550 MAP less than 65 - derik gtt titrated to 25 mcg/min    2636-6289 Patient extubated to 3 L NC, OGT and BL wrist restraints removed. Reassessment completed - see

## 2024-01-17 NOTE — PROGRESS NOTES
Drains:10]  Last 24 hrs.:   Intake/Output Summary (Last 24 hours) at 1/17/2024 0929  Last data filed at 1/17/2024 0919  Gross per 24 hour   Intake 4669.76 ml   Output 2365 ml   Net 2304.76 ml       EXAM:  General:  No acute distress, sedated            Lungs:   Clear to auscultation bilaterally.   Incision:  No erythema, drainage or swelling. Prineo in place   Heart:  Regular rate and rhythm, S1, S2 normal, no murmur, click, rub or gallop.   Abdomen:   Soft, non-tender. Bowel sounds hypoactive. No masses,  No organomegaly.    Extremities:  No edema. PPP.    Neurologic:  sedated     Labs:   Recent Labs     01/16/24 2029 01/17/24  0024 01/17/24  0440   WBC 10.4   < > 7.1   HGB 12.1   < > 11.2*   HCT 36.8   < > 33.8*      < > 132*      < > 144   K 4.0   < > 4.2   BUN 18   < > 18   INR 1.2*  --   --     < > = values in this interval not displayed.        Assessment:     Principal Problem:    NSTEMI (non-ST elevated myocardial infarction) (HCC)  Active Problems:    S/P CABG x 4  Resolved Problems:    * No resolved hospital problems. *       Plan/Recommendations/Medical Decision Making:     NSTEMI/CAD s/p Emergency CABG: On ASA, statin (pt wife to bring in home med, he has allergy to all statins available from hospital), no BB until appropriate. IABP removed this morning. Keep sedated and on vent until 6 hrs post IABP removal.  2. Anticipated post op ventilator dependence: Keep sedated until 6 hrs post IABP removal, then SAT/SBT, plan to extubate this afternoon.  3. Junctional rhythm: Paced DDD 80, hold amio, no BB, cont to monitor.   4. CKD stage II: Monitor UO, avoid nephrotoxic meds  5. Thrombocytopenia: Mild, monitor Plt  6. DM type II: A1C 7.3, on metformin, glipizide, jardiance preop, cont insulin gtt per protocol. Diabetes management consult.   7. HLD: on lovasatin, has allergy to multiple other statins, pt family to bring in from home.   8. Hx hypercalcemia: Has been told to avoid calcium  supplements/tums, monitor  9. Possible BPH: pt reports trouble passing urine, not on any meds PTA, may need flomax, monitor    Pain regimen: scheduled tylenol, prn oxy and dilaudid, lidocaine patches  Fluid and electrolytes: Maintain K+ >4 and Mg level >2.  Nutrition: advance diet as tolerated, cardiac  Activity: OOB all meals and ambulate in halls, encourage I/S   Bowel Regimen: Sennacot , Miralax, and prn dulcolax   GI ppx: Pepcid  DVT ppx: SCDs   Dispo: Keep sedated/on vent until 6 hrs post IABP removal, then can awake, SBT, would like to extubate this afternoon. Cont ICU status for now.     Signed By: JAMIN Agustin NP

## 2024-01-17 NOTE — DIABETES MGMT
[facility]  PROGRAM FOR DIABETES HEALTH  DIABETES MANAGEMENT CONSULT    Consulted by  Obey Lopez PA-C  for advanced nursing evaluation and care for inpatient blood glucose management.    Evaluation and Action Plan   Nicola Draper is a 78 year old gentleman, with nearly controlled Type 2 Diabetes on Jardiance and Glipizide, who is admitted with an NSTEMI and found to have multivessel CAD now s/p IABP placement and emergent CABG x4.  The Program for Diabetes Health has been consulted to assist in glycemic management and advanced diabetes management assessment this admission. His A1C is 7.3%, improved from 11% in October after adding 5mg Glipizide BID and monitoring glucose.  His glucose was significantly elevated at 304 at admission and 10 units glargine was given.  Following CABG, he was started on an insulin gtt for management of diabetes and stress hyperglycemia with excellent control.  Suspect he will need basal/bolus insulin this admission for glycemic control once transitioning off insulin gtt.     Action Plan    Continue insulin gtt. He will likely need basal/bolus insulin following the transfer and we will assist with this transition.       Diabetes Discharge Plan   Medication  TBD   Additional orders            Initial Presentation   Nicola Draper is a 78 y.o. male who presented to the ED 1/15/24 with a c/o chest pressure.  Reports pressure today was a 3 out of 10 and felt similar to prior MI pain. Reports it started around 9 AM. Took nitroglycerin at 9 AM and at noon. Reports only minimal relief with nitroglycerin. Also took aspirin prior to arrival.   LAB: Creat 1.89, eGFR 36, Glucose 304, Trop 248, Alk phos 120  CXR: No acute disease     HX:   Past Medical History:   Diagnosis Date    CAD (coronary artery disease), native coronary artery     MI with RCA stent 2011. NSTEMI with L circumflex stent 2021    Diabetes (HCC)     Hernia of abdominal wall 2012    Hypercholesterolemia     Hypertension     MI,  old 2011        INITIAL DX: Chest pain [R07.9]  NSTEMI (non-ST elevated myocardial infarction) ([facility]) [I21.4]     Current Treatment     TX: IABP, CABG x4    Hospital Course   Clinical progress has been uncomplicated.   1/15: Admission with NSTEMI. Heparin gtt  1/16: Cardiology consult. Cardiac cath with CAD. IABP. CABGx4  1/17: IABP removed. Paced.   Diabetes History   Type 2 Diabetes  Ambulatory BG management provided by:     Lives with girlfriend, retired. Independent     Diabetes-related Medical History  Neurological complications  Peripheral neuropathy  Microvascular disease  Nephropathy  Macrovascular disease  CAD and myocardial infarction    Diabetes Medication History  Diabetes drug class Diabetes drug name Additional Comments   Biguanide   Metformin? No record of this in pharmacy fills   Sulfonylureas Glipizide 5mg BID  Added in Oct when A1C was 11%   DPP4 inhibitors     Thiazolidinediones     SGLT-2 inhibitors Jardiance 25mg daily    GLP-1 Margaret     Insulin       Diabetes self-management practices: Deferred, intubated  Eating pattern     Physical activity pattern     Monitoring pattern     Taking medications pattern    Reducing risks    Social determinants of health impacting diabetes self-management practices       Overall evaluation:    [x] Achieving individualized A1c target with drug therapy & self-care practices    Subjective   Intubated      Objective   Physical exam  General Overweight and intubated gentleman laying in ICU bed  Neuro  Sedated   Vital Signs   Vitals:    01/17/24 1058   BP:    Pulse:    Resp:    Temp: 97.2 °F (36.2 °C)   SpO2:      Skin  Warm and dry. Acanthosis noted along neckline. Sternal surgical incision.  Chest tubes.  Extremities No foot wounds        Laboratory  Recent Labs     01/16/24 2029 01/17/24  0024 01/17/24  0440   WBC 10.4 7.3 7.1   HGB 12.1 11.2* 11.2*   HCT 36.8 33.7* 33.8*   MCV 88.9 90.3 89.9    126* 132*     Recent Labs     01/16/24 2029 01/17/24  0024

## 2024-01-17 NOTE — ANESTHESIA PROCEDURE NOTES
Arterial Line:    An arterial line was placed using surface landmarks, in the pre-op for the following indication(s): continuous blood pressure monitoring and blood sampling needed.    A 20 gauge (size) (length), Arrow (type) catheter was placed, Seldinger technique used, into the left radial artery, secured by Tegaderm.  Anesthesia type: Local  Local infiltration: Injection    Events:  patient tolerated procedure well with no complications.1/16/2024 2:03 PM1/16/2024 2:12 PM  Anesthesiologist: Ron Sanabria MD  Performed: Anesthesiologist   Preanesthetic Checklist  Completed: patient identified, IV checked, site marked, risks and benefits discussed, surgical/procedural consents, equipment checked, pre-op evaluation, timeout performed, anesthesia consent given, oxygen available, monitors applied/VS acknowledged and fire risk safety assessment completed and verbalized          
Central Venous Line:    A central venous line was placed using ultrasound guidance and surface landmarks, in the pre-op for the following indication(s): central venous access and CVP monitoring.1/16/2024 2:12 PM1/16/2024 2:23 PM    Sterility preparation included the following: hand hygiene performed prior to procedure, maximum sterile barriers used and sterile technique used to drape from head to toe.    The patient was placed in Trendelenburg position.The right internal jugular vein was prepped.    The site was prepped with Chloraprep.  A 9 Fr (size), 12 (length), introducer double lumen was placed.    During the procedure, the following specific steps were taken: target vein identified, needle advanced into vein and blood aspirated and guidewire advanced into vein.    Intravenous verification was obtained by ultrasound, venous blood return and manometry.    Post insertion care included: all ports aspirated, all ports flushed easily, guidewire removed intact, Biopatch applied, line sutured in place and dressing applied.    During the procedure the patient experienced: patient tolerated procedure well with no complications.      Outcomes: uncomplicated  Anesthesia type: localA(n) oximetric, 8 (size) Pulmonary Artery Catheter (PAC) was placed through the Introducer CVL in the right internal jugular vein.  The PAC placement was confirmed by pressure tracing changes and MOOKIE.  The patient experienced the following events during the procedure: patient tolerated procedure well with no complications.PA Cath placed?: Yes  Staffing  Performed: Anesthesiologist   Anesthesiologist: Kameron Garza MD  Performed by: Kameron Garza MD  Authorized by: Kameron Garza MD    Preanesthetic Checklist  Completed: patient identified, IV checked, site marked, risks and benefits discussed, surgical/procedural consents, equipment checked, pre-op evaluation, timeout performed, anesthesia consent given, oxygen available, monitors 
Procedure Performed: MOOKIE       Start Time:        End Time:        General Procedure Information  Diagnostic Indications for Echo:  assessment of ascending aorta, assessment of surgical repair, hemodynamic monitoring and assessment of valve function  Location performed:  OR  Intubated  Bite block placed  Heart visualized  Probe Type:  3D, mulitplane, epiaortic and biplane  Modalities:  2D and color flow mapping    Echocardiographic and Doppler Measurements    Ventricles    Other Ventricular Findings:       LVEF >55%                                Anesthesia Information  Performed Personally  Anesthesiologist:  Meri Pierce DO    Post Intervention Follow-up Study  Aortic Function: [id number] Function: unchanged1+Tricuspid Function: unchanged1+NoneComments: Post  study only:  LV systolic function immediately post CPB mildly reduced, improved septal and inferior wall hypokinesis to normal, inferior lateral and lateral wall moderately hypokinetic and improved with initiation of 1:1 IABP augmentation (basal normal, mid papillary mildly hypokinetic and apex normal)  Normal RV systolic function  IABP in appropriate position  Aorta intact after decannulation  No significant changes after chest closure    All findings communicated       
Regurgitation none.  Leaflets normal.  Leaflet motions normal.    Pulmonic Valve:  Annulus normal.  Stenosis not present.  Regurgitation none.        Aorta    Ascending Aorta:  Size normal.  Dissection not present.    Aortic Arch:  Size normal.  Dissection not present.    Descending Aorta:  Size normal.  Dissection not present.        Atria    Right Atrium:  Size normal.  Spontaneous echo contrast not present.  Thrombus not present.  Tumor not present.  Device not present.      Left Atrium:  Size normal.  Spontaneous echo contrast not present.  Thrombus not present.  Tumor not present.  Device not present.    Left atrial appendage normal.      Septa    Atrial Septum:  Intra-atrial septal morphology normal.      Ventricular Septum:  Intra-ventricular septum morphology normal.      Diastolic Function Measurements:  Diastolic Dysfunction Grade= III (Restrictive)  E=  ms  A=  ms  E/A Ratio=  0.6  DT=  ms  S/D=   IVRT=    Other Findings  Pericardium:  normal  Pleural Effusion:  none  Pulmonary Arteries:  normal  Pulmonary Venous Flow:  normal    Anesthesia Information  Performed Personally  Anesthesiologist:  Kameron Garza MD    Post Intervention Follow-up Study  None

## 2024-01-17 NOTE — PLAN OF CARE
Problem: Safety - Medical Restraint  Goal: Remains free of injury from restraints (Restraint for Interference with Medical Device)  Description: INTERVENTIONS:  1. Determine that other, less restrictive measures have been tried or would not be effective before applying the restraint  2. Evaluate the patient's condition at the time of restraint application  3. Inform patient/family regarding the reason for restraint  4. Q2H: Monitor safety, psychosocial status, comfort, nutrition and hydration  1/17/2024 1007 by Sherita Huizar RN  Outcome: Progressing  Flowsheets  Taken 1/17/2024 0912  Remains free of injury from restraints (restraint for interference with medical device): Determine that other, less restrictive measures have been tried or would not be effective before applying the restraint  Taken 1/17/2024 0800  Remains free of injury from restraints (restraint for interference with medical device):   Determine that other, less restrictive measures have been tried or would not be effective before applying the restraint   Evaluate the patient's condition at the time of restraint application  1/17/2024 0630 by Adwoa Patel RN  Outcome: Progressing  Flowsheets (Taken 1/16/2024 2020)  Remains free of injury from restraints (restraint for interference with medical device):   Determine that other, less restrictive measures have been tried or would not be effective before applying the restraint   Evaluate the patient's condition at the time of restraint application   Inform patient/family regarding the reason for restraint   Every 2 hours: Monitor safety, psychosocial status, comfort, nutrition and hydration

## 2024-01-17 NOTE — PROGRESS NOTES
1900: Bedside and Verbal shift change report given to PERLA Orona (oncoming nurse) by Nava, PERLA & Margoth, RN (offgoing nurse). Report included the following information Nurse Handoff Report, Index, Adult Overview, Surgery Report, Intake/Output, MAR, Recent Results, Med Rec Status, and Cardiac Rhythm NSR .     1941: Dr. Verduzco at bedside for update. Per Dr. Verduzco, leave pt intubated overnight. Can attempt to wean IABP over night and infused 1 unit Platelets ~0500 despite CBC results.    2055: Shift assessment complete. Pt is intubated/sedated. PERRLA,, some movement to stimuli but inconsistent. Pt A PACED 80/6, IABP 1:1. BP at goal systolic <120, MAP>65. Lungs clear/diminished bilaterally, tolerating current vent settings. Skin dry and warm, extremities cool, pulses present. CT patent draining sanguinous fluid, rodriguez patent draining clear yellow urine. UO adequate. See flow sheet for further details    2110: Pt no longer meeting hemodynamic goals, phenylephrine restarted     2117: albumin given for CVP 5 and hypotension    2121: EKG complete underlying rhythm Junctional rate 53    2242:phenylephrine stopped    2339: phenylephrine restarted    0000: reassessment complete, no acute changes    0004: pacer no longer capturing, HR down to 38, Arterial systolic in 60s, IABP systolic in 50s Mas increased, no changes,pacer switched to DDD mode 80/13/5, phenylephrine titrated outside of order set, bairhugger applied for temp 35.7, external pacer pads applied    0210: BP stable on 5 mcg ROSARIO, UO good, CVP 7-8, Albumin given for CVP<12 and attempt to wean off ROSARIO    0215: pt hypotensive 89/26 (55), phenylephrine titrated back up    0245: IABP frequency weaned to 1:2    0400: Reassessment complete, no acute changes. Pt on 0.3 mcg Precedex, 10 mcg Propofol, 15 mcg phenylephrine. IABP frequency remains 1:2.    0500: platelet infusion started    0540: EKG afib w/PVCs rate between 40-70s while off pacer    0628: IABP Frequency  weaned to 1:3    0700: END OF SHIFT    PACER: DDD 80/13/5    DRIPS:  INSULIN  DEX 0.3  PHENYLEPHRINE 5 MCG    UO 12 hr: 1035 mL  CT 12 hr: 270 mL    SHIFT REPORT GIVEN TO CARLENE

## 2024-01-17 NOTE — BRIEF OP NOTE
BRIEF OP NOTE  Pre-Op Diagnosis: Coronary artery disease, unspecified vessel or lesion type, unspecified whether angina present, unspecified whether native or transplanted heart [I25.10]    Post-Op Diagnosis: Coronary artery disease, unspecified vessel or lesion type, unspecified whether angina present, unspecified whether native or transplanted heart [I25.10]      Procedure: Procedure(s):  ON PUMP CORONARY ARTERY BYPASS (CABG) x4 (LIMA-LAD, SVG-RPDA, SVG-OM1-OM2) WITH ENDOSCOPIC VEIN HARVEST (EVH) AND CARDIOPLEGIA (NO PAC). INTRAOPERATIVE MOOKIE DONE BY DR. GRAJEDA. EVH DONE BY JOSE PERALES.    Surgeon: Dr. Nicko MD    Assistant(s): Obey Lopez PA-C    Anesthesia: General     Infusions: Baldo, precedex, insulin     Estimated Blood Loss: 450 ml    Cell Saver: 707 ml    Bypass Time: 132 min    Cross Clamp Time: 103 min    Specimens: * No specimens in log *    Drains and pacing wires: 2 atrial wires, 1 bipolar ventricular wire, 4 kulwant drains (2 Mediastinal, 1 L pleural, 1 R pleural)    Complications: None    Findings: See full operative note.    Implants:   Implant Name Type Inv. Item Serial No.  Lot No. LRB No. Used Action   PUTTY BONE HEMSTAT ABSORB MTRX 2.0GRAM - [id number]  PUTTY BONE HEMSTAT ABSORB MTRX 2.0GRAM N/A [employer]WD [id number] N/A 2 Implanted

## 2024-01-17 NOTE — PROGRESS NOTES
01/17/24 0339   B: Both Spontaneous Awakening and Breathing Trials   Was Patient Receiving Mechanical Ventilation Yes   Safety Screening Spontaneous Breathing Trial (SBT)   (Leave intubated per Dr. Maharaj)

## 2024-01-17 NOTE — PLAN OF CARE
Problem: Safety - Medical Restraint  Goal: Remains free of injury from restraints (Restraint for Interference with Medical Device)  Description: INTERVENTIONS:  1. Determine that other, less restrictive measures have been tried or would not be effective before applying the restraint  2. Evaluate the patient's condition at the time of restraint application  3. Inform patient/family regarding the reason for restraint  4. Q2H: Monitor safety, psychosocial status, comfort, nutrition and hydration  Outcome: Progressing  Flowsheets (Taken 1/16/2024 2020)  Remains free of injury from restraints (restraint for interference with medical device):   Determine that other, less restrictive measures have been tried or would not be effective before applying the restraint   Evaluate the patient's condition at the time of restraint application   Inform patient/family regarding the reason for restraint   Every 2 hours: Monitor safety, psychosocial status, comfort, nutrition and hydration     Problem: Safety - Adult  Goal: Free from fall injury  Outcome: Progressing  Flowsheets (Taken 1/16/2024 2055)  Free From Fall Injury:   Instruct family/caregiver on patient safety   Based on caregiver fall risk screen, instruct family/caregiver to ask for assistance with transferring infant if caregiver noted to have fall risk factors

## 2024-01-17 NOTE — PROGRESS NOTES
Physical therapy    Orders acknowledged. Chart reviewed. Pt s/p CABG x4, remains intubated/sedated. PT will defer and follow up as appropriate.     Marcelina Carl, PT, DPT

## 2024-01-17 NOTE — PROGRESS NOTES
BRIEF OVERNIGHT CRITICAL CARE PROGRESS NOTE (-)      Name: Nicola Draper   : 1945   MRN: [id number]   Date: 2024      REASON FOR ICU ADMISSION:  S/p CABG x 4    BRIEF PATIENT SUMMARY   79 y/o male past medical history significant for CAD/MI s/p PCI  in , HTN, HLD, diabetes type 2 and CKD stage II admitted from [facility] for NSTEMI after presenting with chest pain. Underwent urgent LHC demonstrating multivessel coronary artery disease, IABP  was placed, CTS consulted and underwent urgent CABG x 4. Transferred to ICU post op on mechanical ventilation    OVERNIGHT EVENTS/ASSESSMENT     Plan per CTS: transfuse 1 unit of Platelets in AM, Keep SBP < 120 mm Hg, Map > 65, Keep intubated overnight    Intermittent requiring derik-synephrine- currently on 25 mcg/min  S/p 1 unit of platelets this AM  Received albumin x 2 with improvement in CVP 9-11 mm Hg  Urine output currently 960 cc x 10 hrs  Chest tube output (~240 cc)  ~0000 PM no longer capturing with HR 38, SBP 60s , IABP systolic 50s, Pacer now in DDD mode 80/13/85  IABP frequency now 1:2    PULMONOLOGY:   Ventilator Management  Keep on full ventilatory support for the time being and wean as tolerated when more awake.   Lung protective ventilation with goal plateau pressure < 30, driving pressure < 15  If unable to wean vent and patient requires increasing vent support will Rx with sedation and analgesia to facilitate vent synchrony.   HOB elevated to 30 degrees, VAP bundle with chlorhexidine, Post-extubation pulmonary toilette with adequate postop pain control.     CARDIOVASCULAR:   CAD S/P CABG x 4  Hemodynamic management in collaboration with CT surgery and postoperative guidelines.   Phenylephrine for Map goal > 65, Nicardipine for SBP goal < 140 mm Hg  Wean pressors and inotrope's as able  ASA and statin for CAD Rx when feasible.   When hemodynamically tolerated will also consider initiation of beta blockers.     HEMATOLOGY/ONCOLOGY:

## 2024-01-17 NOTE — OP NOTE
[facility]  OPERATIVE REPORT    Name:  ROBERT CRISTINA  MR#:  [id number]  :  1945  ACCOUNT #:  [id number]  DATE OF SERVICE:  2024      PREOPERATIVE DIAGNOSES:  1.  Severe multivessel coronary artery disease.  2.  Non-ST-elevation myocardial infarction.  3.  Active Plavix use without interruption of Plavix.  4.  Chronic kidney disease stage II.  5.  Diabetes mellitus type 2.  6.  Hypertension.  7.  Hyperlipidemia.    POSTOPERATIVE DIAGNOSES:  1.  Severe multivessel coronary artery disease.  2.  Non-ST-elevation myocardial infarction.  3.  Active Plavix use without interruption of Plavix.  4.  Chronic kidney disease stage II.  5.  Diabetes mellitus type 2.  6.  Hypertension.  7.  Hyperlipidemia.    PROCEDURE PERFORMED:  1.  Emergency coronary artery bypass grafting x4:  Reverse saphenous vein graft to right PDA, reverse saphenous vein graft to left PDA sequence to OM2, left internal mammary artery to LAD.  2.  Endoscopic harvest of the saphenous vein.  3.  Epiaortic ultrasound.    SURGEON:  Nguyễn Maharaj MD    ASSISTANT:  Obey Lopez PA-C with Ron Kelly PA-C    ANESTHESIA:  General endotracheal.    ANESTHESIOLOGIST:  Kameron Garza MD    COMPLICATIONS:  None.    SPECIMENS REMOVED:  None.    IMPLANTS:  None.    ESTIMATED BLOOD LOSS:  500 mL.    DETAILS:  The patient is a 78-year-old gentleman who was having active chest pain in the setting of nitroglycerin drip as well as intraaortic balloon pump.  He had critical LAD disease as well as severe multivessel disease with in-stent re-stenosis.  He was referred by Dr. Anderson for emergency CABG.    PROCEDURE:  He was prepped and draped in sterile fashion.  A time-out was performed.  An incision was made over the sternum and median sternotomy was performed.  Left hemisternum was elevated.  Left internal mammary artery was dissected free from the left chest wall.  It was ligated and divided and wrapped in a papaverine-soaked sponge.

## 2024-01-17 NOTE — ANESTHESIA POSTPROCEDURE EVALUATION
Department of Anesthesiology  Postprocedure Note    Patient: Nicola Draper  MRN: [id number]  YOB: 1945  Date of evaluation:1/16/2024    Procedure Summary     Date: 01/16/24 Room / Location: [facility] HEART OR M6 / [facility] OPEN HEART    Anesthesia Start: 1413 Anesthesia Stop: 2031    Procedure: ON PUMP CORONARY ARTERY BYPASS (CABG) x4 WITH ENDOSCOPIC VEIN HARVEST (EVH) AND CARDIOPLEGIA (NO PAC). INTRAOPERATIVE MOOKIE DONE BY DR. GRAJEDA. EVH DONE BY JOSE SHIN. (Chest) Diagnosis:       Coronary artery disease, unspecified vessel or lesion type, unspecified whether angina present, unspecified whether native or transplanted heart      (Coronary artery disease, unspecified vessel or lesion type, unspecified whether angina present, unspecified whether native or transplanted heart [I25.10])    Surgeons: Nguyễn Maharaj MD Responsible Provider: Meri Pierce DO    Anesthesia Type: General ASA Status: 4          Anesthesia Type: General    Shai Phase I: Shai Score: 10    Shai Phase II:      Anesthesia Post Evaluation    Patient location during evaluation: ICU  Patient participation: complete - patient cannot participate  Level of consciousness: sedated and ventilated  Pain score: 0  Airway patency: patent  Nausea & Vomiting: no nausea and no vomiting  Cardiovascular status: vasoactive/inotropes and hemodynamically stable  Respiratory status: acceptable, ventilator and intubated  Hydration status: euvolemic  Pain management: adequate        No notable events documented.

## 2024-01-17 NOTE — PROGRESS NOTES
Occupational Therapy    OT referral received and chart reviewed. Pt s/p CABG x4, remains intubated/sedated. OT will defer and follow up as appropriate.

## 2024-01-17 NOTE — PROGRESS NOTES
[facility] PROGRESS NOTE.      Name: Nicola Draper   : 1945   MRN: [id number]   Date: 2024      Chief Complaint   Patient presents with    Chest Pain     Reason for ICU admission: post CABG    Hospital Course:     79 y/o male past medical history significant for CAD/MI s/p PCI  in , HTN, HLD, diabetes type 2 and CKD stage II.   24 - admitted from [facility] for NSTEMI after presenting with chest pain. Underwent urgent LHC here demonstrating multivessel coronary artery disease, IABP  was placed, CTS consulted and underwent urgent CABG x 4. Transferred to ICU post op on mechanical ventilation and kept intubated overnight.     Subjective/ Objective:     Overnight events: Impella has been weaned off.     This AM, patient kept intubated 6 hours post impella removal. Reevaluated in later afternoon, doing great on SBT. Pending ABG     Assessment & Plan     # CAD S/P CABG x 4  - Evaluation of postoperative labs and imaging studies have been performed.   - OK to extubate   - Hemodynamic management per CT surgery postoperative guidelines.   - ASA and statin for CAD Rx when feasible.   - When hemodynamically tolerated will also consider initiation of beta blockers.   - Insulin for glycemic control.    Vasoplegia  - Wean pressors and inotropes as able.   - hemodynamic management in collaboration with CT surgery    Ventilator Management  - Post-extubation pulmonary toilette with adequate postop pain control.   - Early mobilization with PT/OT as able    Anemia secondary to acute blood loss  - Follow CT output for now.   - Follow CBC   - Supportive transfusions if needed.     Thrombocytopenia  - follow CBC for now.   - No significant clinical bleeding.     Prophylaxis  - DVT and SUP prophylaxis  - VAP bundle with chlorhexidine      CRITICAL CARE CONSULTANT NOTE  I had a face to face encounter with the patient, reviewed and interpreted patient data including clinical events, labs, images, vital  MG EC tablet 1 tablet 11  Yes Automatic Reconciliation, Ar   nitroGLYCERIN (NITROSTAT) 0.4 MG SL tablet Place 1 tablet under the tongue 18  Yes Automatic Reconciliation, Ar       Allergies/Social/Family History:     Allergies   Allergen Reactions    Atorvastatin Myalgia    Rosuvastatin Myalgia    Simvastatin Myalgia      Social History     Tobacco Use    Smoking status: Former     Current packs/day: 0.00     Types: Cigarettes     Quit date: 1985     Years since quittin.5    Smokeless tobacco: Never   Substance Use Topics    Alcohol use: No      Family History   Problem Relation Age of Onset    No Known Problems Mother     Cancer Brother     Heart Disease Father        LABS AND  DATA:   Reviewed    Peak airway pressure:      Minute ventilation:

## 2024-01-17 NOTE — PROGRESS NOTES
Progress Note      1/17/2024 9:21 AM  NAME: Nicola Draper   MRN:  [id number]   Admit Diagnosis: Chest pain [R07.9]  NSTEMI (non-ST elevated myocardial infarction) (HCC) [I21.4]      Primary Cardiologist: Dr Kelly          Assessment:    Problem list:   NSTEMI, h/o PCI, cath on 1/16/2024 multivessel disease, s/p IABP for CP, underwent emergent CABG with LIMA to LAD, SVG > OM2> L-PL, SVG to PDA.   HTN  DM  HLP  Renal insufficiency   Respiratory failure s/p intubation post CABG    Lives with girlfriend, has 5 children from previous marriage, retired  Follows with Dr. Kelly, last seen a year ago          Recommendations:      Cont aspirin   On Amiodarone, Currently AV paced, if remains underlying bradycardia - wean or hold   Resume plavix when okay from surgery standpoint   Wean Baldo as tolerates   Possible extubation later today     Routine post procedure care     Will see intermittently as needed         [x]        High complexity decision making was performed    Subjective:     HPI:   Had CABG for refractory CP   IABP removed today   Low dose of baldo       Objective:      Physical Exam:    Last 24hrs VS reviewed since prior progress note. Most recent are:    BP 99/70   Pulse 82   Temp (!) 96.3 °F (35.7 °C) (Bladder)   Resp 14   Ht 1.829 m (6')   Wt 84.4 kg (186 lb 1.1 oz)   SpO2 100%   BMI 25.24 kg/m²     Intake/Output Summary (Last 24 hours) at 1/17/2024 0921  Last data filed at 1/17/2024 0919  Gross per 24 hour   Intake 4669.76 ml   Output 2365 ml   Net 2304.76 ml           General: intubated and sedated   Neck: Supple   Respiratory: No respiratory distress, clear lung sound   Cardiovascular: Regular rate rhythm   Abdomen: soft, non tender, non distended   Neuro: moves all extremities, oriented x3   Skin: warm and dry   Extremity: no edema, warm to touch        Data Review    Telemetry: AV Paced rhythm        Lab Data Personally Reviewed:    Recent Labs     01/17/24  0024 01/17/24  0440   WBC 7.3

## 2024-01-18 ENCOUNTER — APPOINTMENT (OUTPATIENT)
Facility: HOSPITAL | Age: 79
DRG: 233 | End: 2024-01-18
Payer: MEDICARE

## 2024-01-18 LAB
ANION GAP SERPL CALC-SCNC: 3 MMOL/L (ref 5–15)
ANION GAP SERPL CALC-SCNC: 7 MMOL/L (ref 5–15)
BLD PROD TYP BPU: NORMAL
BLOOD BANK DISPENSE STATUS: NORMAL
BPU ID: NORMAL
BUN SERPL-MCNC: 22 MG/DL (ref 6–20)
BUN SERPL-MCNC: 25 MG/DL (ref 6–20)
BUN/CREAT SERPL: 13 (ref 12–20)
BUN/CREAT SERPL: 14 (ref 12–20)
CALCIUM SERPL-MCNC: 9.5 MG/DL (ref 8.5–10.1)
CALCIUM SERPL-MCNC: 9.5 MG/DL (ref 8.5–10.1)
CHLORIDE SERPL-SCNC: 112 MMOL/L (ref 97–108)
CHLORIDE SERPL-SCNC: 116 MMOL/L (ref 97–108)
CO2 SERPL-SCNC: 21 MMOL/L (ref 21–32)
CO2 SERPL-SCNC: 23 MMOL/L (ref 21–32)
CREAT SERPL-MCNC: 1.73 MG/DL (ref 0.7–1.3)
CREAT SERPL-MCNC: 1.77 MG/DL (ref 0.7–1.3)
EKG ATRIAL RATE: 60 BPM
EKG DIAGNOSIS: NORMAL
EKG P AXIS: -2 DEGREES
EKG P-R INTERVAL: 164 MS
EKG Q-T INTERVAL: 372 MS
EKG QRS DURATION: 94 MS
EKG QTC CALCULATION (BAZETT): 372 MS
EKG R AXIS: -42 DEGREES
EKG T AXIS: -14 DEGREES
EKG VENTRICULAR RATE: 60 BPM
ERYTHROCYTE [DISTWIDTH] IN BLOOD BY AUTOMATED COUNT: 13.6 % (ref 11.5–14.5)
GLUCOSE BLD STRIP.AUTO-MCNC: 102 MG/DL (ref 65–117)
GLUCOSE BLD STRIP.AUTO-MCNC: 105 MG/DL (ref 65–117)
GLUCOSE BLD STRIP.AUTO-MCNC: 106 MG/DL (ref 65–117)
GLUCOSE BLD STRIP.AUTO-MCNC: 111 MG/DL (ref 65–117)
GLUCOSE BLD STRIP.AUTO-MCNC: 115 MG/DL (ref 65–117)
GLUCOSE BLD STRIP.AUTO-MCNC: 116 MG/DL (ref 65–117)
GLUCOSE BLD STRIP.AUTO-MCNC: 118 MG/DL (ref 65–117)
GLUCOSE BLD STRIP.AUTO-MCNC: 121 MG/DL (ref 65–117)
GLUCOSE BLD STRIP.AUTO-MCNC: 128 MG/DL (ref 65–117)
GLUCOSE BLD STRIP.AUTO-MCNC: 129 MG/DL (ref 65–117)
GLUCOSE BLD STRIP.AUTO-MCNC: 138 MG/DL (ref 65–117)
GLUCOSE BLD STRIP.AUTO-MCNC: 182 MG/DL (ref 65–117)
GLUCOSE BLD STRIP.AUTO-MCNC: 215 MG/DL (ref 65–117)
GLUCOSE BLD STRIP.AUTO-MCNC: 92 MG/DL (ref 65–117)
GLUCOSE SERPL-MCNC: 106 MG/DL (ref 65–100)
GLUCOSE SERPL-MCNC: 148 MG/DL (ref 65–100)
HCT VFR BLD AUTO: 33.5 % (ref 36.6–50.3)
HGB BLD-MCNC: 10.8 G/DL (ref 12.1–17)
MAGNESIUM SERPL-MCNC: 2.5 MG/DL (ref 1.6–2.4)
MCH RBC QN AUTO: 30.4 PG (ref 26–34)
MCHC RBC AUTO-ENTMCNC: 32.2 G/DL (ref 30–36.5)
MCV RBC AUTO: 94.4 FL (ref 80–99)
NRBC # BLD: 0 K/UL (ref 0–0.01)
NRBC BLD-RTO: 0 PER 100 WBC
PLATELET # BLD AUTO: 149 K/UL (ref 150–400)
PMV BLD AUTO: 10 FL (ref 8.9–12.9)
POTASSIUM SERPL-SCNC: 4.4 MMOL/L (ref 3.5–5.1)
POTASSIUM SERPL-SCNC: 4.4 MMOL/L (ref 3.5–5.1)
RBC # BLD AUTO: 3.55 M/UL (ref 4.1–5.7)
SERVICE CMNT-IMP: ABNORMAL
SERVICE CMNT-IMP: NORMAL
SODIUM SERPL-SCNC: 140 MMOL/L (ref 136–145)
SODIUM SERPL-SCNC: 142 MMOL/L (ref 136–145)
TSH SERPL DL<=0.05 MIU/L-ACNC: 1.48 UIU/ML (ref 0.45–4.5)
UNIT DIVISION: 0
WBC # BLD AUTO: 8.1 K/UL (ref 4.1–11.1)

## 2024-01-18 PROCEDURE — 2700000000 HC OXYGEN THERAPY PER DAY

## 2024-01-18 PROCEDURE — 6370000000 HC RX 637 (ALT 250 FOR IP): Performed by: THORACIC SURGERY (CARDIOTHORACIC VASCULAR SURGERY)

## 2024-01-18 PROCEDURE — 85027 COMPLETE CBC AUTOMATED: CPT

## 2024-01-18 PROCEDURE — 6370000000 HC RX 637 (ALT 250 FOR IP)

## 2024-01-18 PROCEDURE — 2060000000 HC ICU INTERMEDIATE R&B

## 2024-01-18 PROCEDURE — 2580000003 HC RX 258

## 2024-01-18 PROCEDURE — 2500000003 HC RX 250 WO HCPCS

## 2024-01-18 PROCEDURE — P9045 ALBUMIN (HUMAN), 5%, 250 ML: HCPCS | Performed by: THORACIC SURGERY (CARDIOTHORACIC VASCULAR SURGERY)

## 2024-01-18 PROCEDURE — 99231 SBSQ HOSP IP/OBS SF/LOW 25: CPT

## 2024-01-18 PROCEDURE — 36415 COLL VENOUS BLD VENIPUNCTURE: CPT

## 2024-01-18 PROCEDURE — 6360000002 HC RX W HCPCS: Performed by: THORACIC SURGERY (CARDIOTHORACIC VASCULAR SURGERY)

## 2024-01-18 PROCEDURE — 83735 ASSAY OF MAGNESIUM: CPT

## 2024-01-18 PROCEDURE — 80048 BASIC METABOLIC PNL TOTAL CA: CPT

## 2024-01-18 PROCEDURE — 71045 X-RAY EXAM CHEST 1 VIEW: CPT

## 2024-01-18 PROCEDURE — 6360000002 HC RX W HCPCS

## 2024-01-18 PROCEDURE — P9045 ALBUMIN (HUMAN), 5%, 250 ML: HCPCS | Performed by: NURSE PRACTITIONER

## 2024-01-18 PROCEDURE — 6360000002 HC RX W HCPCS: Performed by: NURSE PRACTITIONER

## 2024-01-18 PROCEDURE — 82962 GLUCOSE BLOOD TEST: CPT

## 2024-01-18 RX ORDER — TAMSULOSIN HYDROCHLORIDE 0.4 MG/1
0.4 CAPSULE ORAL
Status: DISCONTINUED | OUTPATIENT
Start: 2024-01-18 | End: 2024-01-27 | Stop reason: HOSPADM

## 2024-01-18 RX ORDER — INSULIN LISPRO 100 [IU]/ML
0-4 INJECTION, SOLUTION INTRAVENOUS; SUBCUTANEOUS NIGHTLY
Status: DISCONTINUED | OUTPATIENT
Start: 2024-01-18 | End: 2024-01-27 | Stop reason: HOSPADM

## 2024-01-18 RX ORDER — DEXTROSE MONOHYDRATE 100 MG/ML
INJECTION, SOLUTION INTRAVENOUS CONTINUOUS PRN
Status: DISCONTINUED | OUTPATIENT
Start: 2024-01-18 | End: 2024-01-27 | Stop reason: HOSPADM

## 2024-01-18 RX ORDER — INSULIN GLARGINE 100 [IU]/ML
8 INJECTION, SOLUTION SUBCUTANEOUS DAILY
Status: DISCONTINUED | OUTPATIENT
Start: 2024-01-19 | End: 2024-01-19

## 2024-01-18 RX ORDER — ALBUMIN, HUMAN INJ 5% 5 %
25 SOLUTION INTRAVENOUS EVERY 4 HOURS
Status: COMPLETED | OUTPATIENT
Start: 2024-01-18 | End: 2024-01-18

## 2024-01-18 RX ORDER — INSULIN LISPRO 100 [IU]/ML
0-8 INJECTION, SOLUTION INTRAVENOUS; SUBCUTANEOUS
Status: DISCONTINUED | OUTPATIENT
Start: 2024-01-18 | End: 2024-01-27 | Stop reason: HOSPADM

## 2024-01-18 RX ORDER — INSULIN GLARGINE 100 [IU]/ML
7 INJECTION, SOLUTION SUBCUTANEOUS ONCE
Status: COMPLETED | OUTPATIENT
Start: 2024-01-18 | End: 2024-01-18

## 2024-01-18 RX ADMIN — MUPIROCIN: 20 OINTMENT TOPICAL at 20:04

## 2024-01-18 RX ADMIN — ACETAMINOPHEN 975 MG: 325 TABLET ORAL at 05:30

## 2024-01-18 RX ADMIN — OXYCODONE 10 MG: 5 TABLET ORAL at 08:10

## 2024-01-18 RX ADMIN — POLYETHYLENE GLYCOL 3350 17 G: 17 POWDER, FOR SOLUTION ORAL at 08:03

## 2024-01-18 RX ADMIN — ONDANSETRON 4 MG: 2 INJECTION INTRAMUSCULAR; INTRAVENOUS at 15:04

## 2024-01-18 RX ADMIN — ONDANSETRON 4 MG: 2 INJECTION INTRAMUSCULAR; INTRAVENOUS at 08:29

## 2024-01-18 RX ADMIN — FAMOTIDINE 20 MG: 20 TABLET, FILM COATED ORAL at 08:03

## 2024-01-18 RX ADMIN — Medication 400 MG: at 08:03

## 2024-01-18 RX ADMIN — DOCUSATE SODIUM AND SENNOSIDES 1 TABLET: 8.6; 5 TABLET, FILM COATED ORAL at 20:19

## 2024-01-18 RX ADMIN — MUPIROCIN: 20 OINTMENT TOPICAL at 08:02

## 2024-01-18 RX ADMIN — CHLORHEXIDINE GLUCONATE, 0.12% ORAL RINSE 15 ML: 1.2 SOLUTION DENTAL at 08:02

## 2024-01-18 RX ADMIN — ASPIRIN 81 MG: 81 TABLET, COATED ORAL at 08:03

## 2024-01-18 RX ADMIN — ALBUMIN (HUMAN) 25 G: 12.5 INJECTION, SOLUTION INTRAVENOUS at 08:28

## 2024-01-18 RX ADMIN — OXYCODONE 10 MG: 5 TABLET ORAL at 03:03

## 2024-01-18 RX ADMIN — TAMSULOSIN HYDROCHLORIDE 0.4 MG: 0.4 CAPSULE ORAL at 20:19

## 2024-01-18 RX ADMIN — OXYCODONE 10 MG: 5 TABLET ORAL at 17:28

## 2024-01-18 RX ADMIN — ALBUMIN (HUMAN) 25 G: 12.5 INJECTION, SOLUTION INTRAVENOUS at 12:36

## 2024-01-18 RX ADMIN — DOCUSATE SODIUM AND SENNOSIDES 1 TABLET: 8.6; 5 TABLET, FILM COATED ORAL at 08:03

## 2024-01-18 RX ADMIN — Medication 400 MG: at 20:19

## 2024-01-18 RX ADMIN — ACETAMINOPHEN 975 MG: 325 TABLET ORAL at 17:28

## 2024-01-18 RX ADMIN — LOVASTATIN 80 MG: 40 TABLET ORAL at 20:22

## 2024-01-18 RX ADMIN — HYDROMORPHONE HYDROCHLORIDE 0.5 MG: 1 INJECTION, SOLUTION INTRAMUSCULAR; INTRAVENOUS; SUBCUTANEOUS at 10:40

## 2024-01-18 RX ADMIN — SODIUM CHLORIDE, PRESERVATIVE FREE 10 ML: 5 INJECTION INTRAVENOUS at 20:03

## 2024-01-18 RX ADMIN — HYDROMORPHONE HYDROCHLORIDE 0.5 MG: 1 INJECTION, SOLUTION INTRAMUSCULAR; INTRAVENOUS; SUBCUTANEOUS at 23:59

## 2024-01-18 RX ADMIN — CHLORHEXIDINE GLUCONATE, 0.12% ORAL RINSE 15 ML: 1.2 SOLUTION DENTAL at 20:03

## 2024-01-18 RX ADMIN — HYDROMORPHONE HYDROCHLORIDE 0.5 MG: 1 INJECTION, SOLUTION INTRAMUSCULAR; INTRAVENOUS; SUBCUTANEOUS at 15:30

## 2024-01-18 RX ADMIN — OXYCODONE 10 MG: 5 TABLET ORAL at 22:07

## 2024-01-18 RX ADMIN — WATER 2000 MG: 1 INJECTION INTRAMUSCULAR; INTRAVENOUS; SUBCUTANEOUS at 10:01

## 2024-01-18 RX ADMIN — INSULIN LISPRO 2 UNITS: 100 INJECTION, SOLUTION INTRAVENOUS; SUBCUTANEOUS at 17:35

## 2024-01-18 RX ADMIN — DEXMEDETOMIDINE 0.2 MCG/KG/HR: 100 INJECTION, SOLUTION INTRAVENOUS at 00:08

## 2024-01-18 RX ADMIN — SODIUM CHLORIDE: 9 INJECTION, SOLUTION INTRAVENOUS at 00:12

## 2024-01-18 RX ADMIN — HYDROMORPHONE HYDROCHLORIDE 0.5 MG: 1 INJECTION, SOLUTION INTRAMUSCULAR; INTRAVENOUS; SUBCUTANEOUS at 01:34

## 2024-01-18 RX ADMIN — SODIUM CHLORIDE, PRESERVATIVE FREE 10 ML: 5 INJECTION INTRAVENOUS at 08:03

## 2024-01-18 RX ADMIN — ACETAMINOPHEN 975 MG: 325 TABLET ORAL at 12:21

## 2024-01-18 RX ADMIN — HYDROMORPHONE HYDROCHLORIDE 0.5 MG: 1 INJECTION, SOLUTION INTRAMUSCULAR; INTRAVENOUS; SUBCUTANEOUS at 20:00

## 2024-01-18 RX ADMIN — OXYCODONE 10 MG: 5 TABLET ORAL at 12:22

## 2024-01-18 RX ADMIN — WATER 2000 MG: 1 INJECTION INTRAMUSCULAR; INTRAVENOUS; SUBCUTANEOUS at 01:36

## 2024-01-18 RX ADMIN — ALBUMIN (HUMAN) 12.5 G: 12.5 INJECTION, SOLUTION INTRAVENOUS at 00:39

## 2024-01-18 RX ADMIN — INSULIN GLARGINE 7 UNITS: 100 INJECTION, SOLUTION SUBCUTANEOUS at 10:40

## 2024-01-18 ASSESSMENT — PAIN DESCRIPTION - DESCRIPTORS
DESCRIPTORS: ACHING
DESCRIPTORS: SORE
DESCRIPTORS: ACHING
DESCRIPTORS: ACHING;SORE
DESCRIPTORS: ACHING

## 2024-01-18 ASSESSMENT — PAIN DESCRIPTION - LOCATION
LOCATION: CHEST
LOCATION: INCISION
LOCATION: CHEST
LOCATION: CHEST;INCISION
LOCATION: CHEST
LOCATION: CHEST;INCISION
LOCATION: INCISION
LOCATION: CHEST

## 2024-01-18 ASSESSMENT — PAIN SCALES - GENERAL
PAINLEVEL_OUTOF10: 5
PAINLEVEL_OUTOF10: 9
PAINLEVEL_OUTOF10: 8
PAINLEVEL_OUTOF10: 7
PAINLEVEL_OUTOF10: 10
PAINLEVEL_OUTOF10: 2
PAINLEVEL_OUTOF10: 8
PAINLEVEL_OUTOF10: 9
PAINLEVEL_OUTOF10: 8
PAINLEVEL_OUTOF10: 3
PAINLEVEL_OUTOF10: 8

## 2024-01-18 ASSESSMENT — PAIN DESCRIPTION - ORIENTATION
ORIENTATION: ANTERIOR;MID
ORIENTATION: ANTERIOR
ORIENTATION: MID
ORIENTATION: ANTERIOR;MID
ORIENTATION: ANTERIOR
ORIENTATION: ANTERIOR;MID
ORIENTATION: ANTERIOR
ORIENTATION: ANTERIOR;MID
ORIENTATION: ANTERIOR

## 2024-01-18 ASSESSMENT — PAIN DESCRIPTION - FREQUENCY
FREQUENCY: CONTINUOUS
FREQUENCY: CONTINUOUS

## 2024-01-18 ASSESSMENT — PAIN DESCRIPTION - PAIN TYPE
TYPE: SURGICAL PAIN

## 2024-01-18 ASSESSMENT — PAIN - FUNCTIONAL ASSESSMENT: PAIN_FUNCTIONAL_ASSESSMENT: ACTIVITIES ARE NOT PREVENTED

## 2024-01-18 ASSESSMENT — PAIN DESCRIPTION - ONSET
ONSET: ON-GOING
ONSET: ON-GOING

## 2024-01-18 NOTE — PROGRESS NOTES
1900: Bedside and Verbal shift change report given to PERLA Orona (oncoming nurse) by PERLA Magallon (offgoing nurse). Report included the following information Nurse Handoff Report, Adult Overview, Intake/Output, MAR, Recent Results, Med Rec Status, Cardiac Rhythm AVPACED, and Neuro Assessment.     2000: Shift assessment complete. Pt is A+O to person, place and situation, knows the year, disoriented to month. Follows commands moves all extrem spontaneously. C/O mild, 3/10 incisional pain, dilaudid last admin at 1848. Lungs are clear/diminished on 3L NC.  Pacer set to DDD 80/10/10. BP stable, currently on 20 mcg phenylephrine. Chest incision clean dry and intact, chest tube patent draining serosanguinous fluid. All pulses present, no edema. Abd soft, non-tender, BS hypoactive. Voiding clear/yellow urine via rodriguez. See flow sheet for further details    2300: unable to wean phenylephrine to meet MAP goal>65, rate on pacer increased to 86     0000: Reassessment complete. SACHA Davis at bedside. Updated on pressor requirements, CVP and UO. Urine output over last 2 hours ~30mL/hr, Unable to wean phenylephrine and CVP 4-5. Orders for 1 albumin    0143: /46 (65) currently on 25 mcg phenylephrine. Pt disconnected from pacer to assess underlying rhythm and affects on BP. Underlying rhythm sinus arrhythmia with BBB, no longer junctional. Rate 60s-70s. While disconnected systolics in low 90s, MAPs in 50s. Pt connected back to pacer at previous settings. Will reassess later in am. Per de-lining protocol will keep arterial line, MAC& rodriguez    0400: Reassessment complete, pt in pain, moaning/restless despite recent prn pain mgmt admin. Pt repositioned for comfort. No other changes    0522: pt disconnected from pacer to assess rhythm, EKG confirms NSR in low 60s. Again significant drop in BP; MAPs in 50s   With native rhythm    0550: pt up to scale and placed in chair. Pt needed max assistance. After getting into chair pt

## 2024-01-18 NOTE — PROGRESS NOTES
0700 Bedside and Verbal shift change report given to Sherita RN (oncoming nurse) by Adwoa RN (offgoing nurse). Report included the following information Nurse Handoff Report, Index, Adult Overview, Intake/Output, MAR, Recent Results, and Cardiac Rhythm AV paced .      0730 Shift assessment completed - see flowsheets     0810 PRN Oxycodone PO given     0829 PRN Zofran IV given     1000 Diabetes management at bedside assessing patient. Per APRN, alright to transition patient off insulin gtt now    1022 Order for 7 units of Lantus to transition patient off insulin gtt confirmed with Villa in pharmacy     1040 7 units of Lantus given - will turn off insulin gtt in 2 hours per protocol. PRN dilaudid IV given     1100 Art line removed     1200 Reassessment completed - see flowsheets     1221 PRN oxycodone PO given     1240 Insulin gtt stopped per protocol     1315 Baldo gtt stopped     1504 PRN zofran IV given     1530 PRN dilaudid IV given     1600 Reassessment completed. Patient went on a walk from chair to doorway and is a 3 person assist. Per patient,he started to feel \"weak and dizzy\" during walk and requested to sit back down before making it into the hallway - see flowsheets     1615 Pacer wire and sternal incision care completed - see flowsheets     1728 PRN oxycodone PO given     1900 Bedside and Verbal shift change report given to Tatianna RN (oncoming nurse) by Sherita RN (offgoing nurse). Report included the following information Nurse Handoff Report, Index, Adult Overview, Intake/Output, MAR, Recent Results, and Cardiac Rhythm NSR to A paced .

## 2024-01-18 NOTE — CARE COORDINATION
Care Management Initial Assessment       RUR: 16%  Readmission? No  1st IM letter given? Yes   1st  letter given: No    CM introduced self and role to significant other , Amy Delaney, verified pt demographics, insurance info,emergency contact and ACD.   Pt lives with significant other in one story home   with 3 steps to enter.  Pt is independent with ADL's, ambulating and ,drives.  DME: cane  HH:  none SNF:none  Uses [facility] in [place], [place]    Disposiiton:  Home with HH vs IPR   Pending PT/OT eval.         01/18/24 1158   Service Assessment   Patient Orientation Alert and Oriented   Cognition Alert   History Provided By Significant Other   Primary Caregiver Self   Support Systems Spouse/Significant Other  (Gabofriend - Amy Delaney)   Patient's Healthcare Decision Maker is: Legal Next of Kin   PCP Verified by CM Yes   Last Visit to PCP Within last 3 months   Prior Functional Level Independent in ADLs/IADLs   Current Functional Level Independent in ADLs/IADLs   Can patient return to prior living arrangement Yes   Ability to make needs known: Good   Family able to assist with home care needs: Yes   Financial Resources Medicare   Community Resources None   Social/Functional History   Lives With Significant other  (Gabofriend - Amy Delaney)   Home Layout One level   Home Equipment None   Receives Help From Other (comment)  (Jarvisiencoty - Amy Delaney)   ADL Assistance Independent   Homemaking Assistance Independent   Ambulation Assistance Independent   Transfer Assistance Independent   Active  Yes   Mode of Transportation Car   Discharge Planning   Living Arrangements Spouse/Significant Other  (Jarvisiencoty - Amy Delaney)   Current Services Prior To Admission None   Potential Assistance Needed N/A   DME Ordered? No   Type of Home Care Services None   Patient expects to be discharged to: House         Advance Care Planning     General Advance Care Planning (ACP) Conversation    Date of

## 2024-01-18 NOTE — PROGRESS NOTES
Cardiac Surgery ICU Progress Note    Admit Date: 2024  POD:  2 Days Post-Op    Procedure:  Procedure(s):  ON PUMP CORONARY ARTERY BYPASS (CABG) x4 WITH ENDOSCOPIC VEIN HARVEST (EVH) AND CARDIOPLEGIA (NO PAC). INTRAOPERATIVE MOOKIE DONE BY DR. GRAJEDA. EVH DONE BY JOSE SHIN.        SubjectiveInterval events:   Patient seen with Dr. Maharaj. Pt up in chair. C/o pain with CT. Not feeling well this am. Hypotensive with getting out of bed, requiring Baldo. Large CT dump with first time OOB. CVP < 6, will give more volume     On 2L NC.Tmax 99.7. Baldo @ 30, Insulin gtts.      Underlying rhythm NSR. Pacer with inappropriate sensing (now set at 40, 3). RN informed to utilize the Emergency pace button if HR drops.      Objective:   Vitals:  Blood pressure (!) 115/56, pulse 78, temperature 99.9 °F (37.7 °C), temperature source Oral, resp. rate 26, height 1.829 m (6'), weight 88.1 kg (194 lb 3.2 oz), SpO2 95 %.  Temp (24hrs), Av.7 °F (37.1 °C), Min:97 °F (36.1 °C), Max:99.9 °F (37.7 °C)      Oxygen Flow Rate: O2 Flow Rate (L/min): 1 L/min    Oxygen Delivery Method: O2 Device: Nasal cannula    [place]  [place]  CVP (Mean): 5 mmHg    EKG/Rhythm:  Junctional rhythm, paced DDD 80    Extubation Date / Time: remains on vent    CT Output: 270 ml    CXR: Xray Result (most recent):  XR CHEST PORTABLE 2024    Narrative  EXAM:  XR CHEST PORTABLE    INDICATION: Postop heart    COMPARISON:     TECHNIQUE: portable chest AP view    FINDINGS: The cardiac silhouette is unchanged. Removal of ET and NG tubes.  Minimal postsurgical change at the lung bases with some pleural thickening and  atelectasis. No other changes.    Impression  Interval removal of ET and NG tubes, other findings are stable    Admission Weight: Last Weight   Weight - Scale: 84.4 kg (186 lb 1.1 oz) Weight - Scale: 88.1 kg (194 lb 3.2 oz)     Intake / Output / Drain:  Current Shift: 701 -  1900  In: 630.9 [P.O.:240; I.V.:121.9]  Out: 180

## 2024-01-18 NOTE — PROGRESS NOTES
Nutrition Note    Chart reviewed for MST: weight loss, but no decrease in appetite. Weight hx reviewed, no significant weight loss noted recently. False trigger. Nutrition assessment not indicated at this time.     Electronically signed by Kriss Hardin RD on 1/18/24 at 11:17 AM EST    Contact: [phone]

## 2024-01-18 NOTE — PROGRESS NOTES
Occupational Therapy    Chart reviewed and evaluation attempted. RN requesting therapy deferral this AM, as remained hypotensive and on derik. Returned this afternoon for second attempt. Pt weaned from derik, up in recliner, but continues to feel poorly and declined evaluation. OT will follow up tomorrow.

## 2024-01-18 NOTE — PROGRESS NOTES
Spiritual Care Assessment/Progress Note  [facility]    Name: Nicola Draper MRN: [id number]    Age: 78 y.o.     Sex: male   Language: English     Date: 1/18/2024            Total Time Calculated: 30 min              Spiritual Assessment begun in [facility]  Service Provided For:: Patient  Referral/Consult From:: Multi-disciplinary team  Encounter Overview/Reason : Advance Care Planning    Spiritual beliefs:      [] Involved in a raulito tradition/spiritual practice:      [] Supported by a raulito community:      [] Claims no spiritual orientation:      [] Seeking spiritual identity:           [] Adheres to an individual form of spirituality:      [x] Not able to assess:                Identified resources for coping and support system:   Support System: Unknown       [] Prayer                  [] Devotional reading               [] Music                  [] Guided Imagery     [] Pet visits                                        [] Other: (COMMENT)     Specific area/focus of visit   Encounter:    Crisis:    Spiritual/Emotional needs: Type: Spiritual Support  Ritual, Rites and Sacraments:    Grief, Loss, and Adjustments:    Ethics/Mediation:    Behavioral Health:    Palliative Care:    Advance Care Planning: Type: ACP conversation    Plan/Referrals: Continue Support (comment)    Narrative:    responded to staff request for an AMD consult in 2403. Patient was sitting in his hospitl recliner and appeared weak and tired. He had no recollection of any conversation with staff about AMD. He was however open to reviewing document. He interrupted the review stating it was not a good time and asked for assistance to eat his breakfast.  ended consult, left a copy of AMD document and advised of chaplains availability anytime he was ready. His nurse was also notified about his request and she went in immediately to assist. Please contact spiritual health dpt. for any further referrals.        Visited by: Chaplain Ceferino Martinez M.Div., [facility].   Paging Service: [phone])

## 2024-01-18 NOTE — ACP (ADVANCE CARE PLANNING)
Advance Care Planning     Advance Care Planning Inpatient Note  Spiritual [facility] Department    Today's Date: 1/18/2024  Unit: [facility]    Received request from IDT Member.  Upon review of chart and communication with care team, patient's decision making abilities are not in question.. Patient was/were present in the room during visit.    Goals of ACP Conversation:  Discuss advance care planning documents    Health Care Decision Makers:       Primary Decision Maker: Amy Delaney - Girlfriend - [phone]  Summary:  No Decision Maker named by patient at this time     Advance Care Planning Documents (Patient Wishes):  Living Will/Advance Directive     Assessment:   responded to staff request for an AMD consult in 2403. Patient was sitting in his hospitl recliner and appeared weak and tired. He had no recollection of any conversation with staff about AMD. He was however open to reviewing document. He interrupted the review stating it was not a good time and asked for assistance to eat his breakfast.  ended consult, left a copy of AMD document and advised of chaplains availability anytime he was ready. His nurse was also notified about his request and she went in immediately to assist. Please contact [facility] dpt. for any further referrals.         Interventions:  Encouraged ongoing ACP conversation with future decision makers and loved ones    Care Preferences Communicated:   No    Outcomes/Plan:  ACP Discussion: Completed    Electronically signed by GERALDINE Gallardo on 1/18/2024 at 10:12 AM

## 2024-01-18 NOTE — DIABETES MGMT
[facility]  PROGRAM FOR DIABETES HEALTH  DIABETES MANAGEMENT CONSULT    Consulted by  Obey Lopez PA-C  for advanced nursing evaluation and care for inpatient blood glucose management.    Evaluation and Action Plan   Nicola Draper is a 78 year old gentleman, with nearly controlled Type 2 Diabetes on Jardiance and Glipizide, who is admitted with an NSTEMI and found to have multivessel CAD now s/p IABP placement and emergent CABG x4.  The Program for Diabetes Health has been consulted to assist in glycemic management and advanced diabetes management assessment this admission. His A1C is 7.3%, improved from 11% in October after adding 5mg Glipizide BID and monitoring glucose.  His glucose was significantly elevated at 304 at admission and 10 units glargine was given.  Following CABG, he was started on an insulin gtt for management of diabetes and stress hyperglycemia with excellent control.  Suspect he will need basal/bolus insulin this admission for glycemic control once transitioning off insulin gtt.   The patient is consuming fluids and tolerating. The patient recommended to transition off the insulin infusion per cardiac surgery protocol. The patient was not using insulin prior to admission. Reportedly using Jardiance and glipizide PTA with good BG control. Current A1c 7.3%. I have ordered a low dose basal insulin regimen and medium dose corrective scale. Diabetes management will monitor BG trends and make further changes/recommendations tomorrow.     Action Plan    Transition off the insulin infusion using cardiac post surgery protocol.  Lantus 8 units daily to start tomorrow  Medium dose corrective scale in place       Diabetes Discharge Plan   Medication  TBD   Additional orders            Initial Presentation   Nicola Draper is a 78 y.o. male who presented to the ED 1/15/24 with a c/o chest pressure.  Reports pressure today was a 3 out of 10 and felt similar to prior MI pain. Reports it started around 9

## 2024-01-18 NOTE — DISCHARGE SUMMARY
questions or concerns.       Signed:  JAMIN Tsai NP  1/21/2024  5:52 AM    How Severe Is This Condition?: mild Additional History: Patient states the scar if a from his skin cancer removal from years ago and would like to make sure the scar looks normal and no reoccurrence to that site.

## 2024-01-18 NOTE — PROGRESS NOTES
[facility] PROGRESS NOTE.      Name: Nicola Draper   : 1945   MRN: [id number]   Date: 2024      Chief Complaint   Patient presents with    Chest Pain     Reason for ICU admission: post CABG    Hospital Course:     79 y/o male past medical history significant for CAD/MI s/p PCI  in , HTN, HLD, diabetes type 2 and CKD stage II.   24 - admitted from [facility] for NSTEMI after presenting with chest pain. Underwent urgent LHC here demonstrating multivessel coronary artery disease, IABP  was placed, CTS consulted and underwent urgent CABG x 4. Transferred to ICU post op on mechanical ventilation and kept intubated overnight.   24 - IABP removed in AM, extubated in afternoon. Stable renal functions     Subjective/ Objective:     Overnight events: none   This AM, patient sitting on chair. In no distress, still on Baldo at 15 mcg but adequate MAPs  In no resp distress  Denies active pain     Assessment & Plan     # CKD stage 3b, stable renal function   - Baseline 1.5 to 1.8 mg per dl since 2019  Plan  - Daily volume status assessment  - Monitor and document daily urine out put with accurate Is and Os  - Please document daily weight.   - Avoid nephrotoxic agents.    # CAD S/P CABG x 4  - Evaluation of postoperative labs and imaging studies have been performed.   - Hemodynamic management per CT surgery postoperative guidelines.   - ASA and statin for CAD Rx when feasible.   - When hemodynamically tolerated will also consider initiation of beta blockers.   - Insulin for glycemic control.    Vasoplegia  - Use pressors and inotropes as needed.   - hemodynamic management in collaboration with CT surgery    Ventilator Management  - Post-extubation pulmonary toilette with adequate postop pain control.   - Early mobilization with PT/OT as able    Anemia secondary to acute blood loss  - Follow CT output for now.   - Follow CBC   - Supportive transfusions if needed.     Thrombocytopenia  - follow  medical history of CAD (coronary artery disease), native coronary artery, Diabetes (HCC), Hernia of abdominal wall, Hypercholesterolemia, Hypertension, and MI, old.    Past Surgical History:      has a past surgical history that includes hernia repair (2012); Coronary angioplasty with stent (2011); Percutaneous Transluminal Coronary Angio (09/2021); Coronary artery bypass graft (N/A, 1/16/2024); Cardiac procedure (N/A, 1/16/2024); and Cardiac procedure (N/A, 1/16/2024).    Home Medications:     Prior to Admission medications    Medication Sig Start Date End Date Taking? Authorizing Provider   metFORMIN (GLUCOPHAGE) 1000 MG tablet Take 1 tablet by mouth 2 times daily (with meals)   Yes Franc Chatterjee MD   blood glucose test strips (ASCENSIA AUTODISC VI;ONE TOUCH ULTRA TEST VI) strip 1 each by In Vitro route daily As needed.   Yes Franc Chatterjee MD   Glucose Blood (ACCU-CHEK PAO PLUS VI)    Yes Franc Chatterjee MD   blood glucose test strips (EXACTECH TEST) strip Check Glucose once a day for DM E11.9 8/6/20  Yes Franc Chatterjee MD   glucose monitoring kit Check Glucose once a day in am fasting and occ 2 h after a meal for DM E11.9 7/17/18  Yes Franc Chatterjee MD   lovastatin (MEVACOR) 40 MG tablet Take 2 at night 10/6/23  Yes Danyell Toussaint MD   glipiZIDE (GLUCOTROL) 5 MG tablet Take 1 tablet by mouth 2 times daily (before meals) 10/6/23  Yes Danyell Toussaint MD   glucose monitoring kit 1 kit by Does not apply route daily Accu-Chek meter to Check Glucose once a day for DM E11.9 10/5/23  Yes Danyell Toussaint MD   amLODIPine (NORVASC) 10 MG tablet Take 1 tablet by mouth daily 10/5/23  Yes Danyell Toussaint MD   clopidogrel (PLAVIX) 75 MG tablet Take 1 tablet by mouth daily 10/5/23  Yes Danyell Toussaint MD   empagliflozin (JARDIANCE) 25 MG tablet Take 1 tablet by mouth daily 10/5/23  Yes Danyell Toussaint MD   lisinopril (PRINIVIL;ZESTRIL) 2.5 MG tablet Take

## 2024-01-18 NOTE — PROGRESS NOTES
Physical therapy    Chart reviewed and evaluation attempted. RN requesting therapy deferral this AM, as remained hypotensive and on derik. Returned this afternoon for second attempt. Pt weaned from derik, up in recliner, but continues to feel poorly and declined evaluation. PT will follow up tomorrow.      Marcelina Carl, PT, DPT

## 2024-01-19 ENCOUNTER — APPOINTMENT (OUTPATIENT)
Facility: HOSPITAL | Age: 79
DRG: 233 | End: 2024-01-19
Payer: MEDICARE

## 2024-01-19 DIAGNOSIS — Z95.1 S/P CABG (CORONARY ARTERY BYPASS GRAFT): Primary | ICD-10-CM

## 2024-01-19 LAB
ABO + RH BLD: NORMAL
ACUTE KIDNEY INJURY RISK NEPHROCHECK: 0.37 (ref 0–0.3)
ANION GAP SERPL CALC-SCNC: 4 MMOL/L (ref 5–15)
BLD PROD TYP BPU: NORMAL
BLD PROD TYP BPU: NORMAL
BLOOD BANK DISPENSE STATUS: NORMAL
BLOOD BANK DISPENSE STATUS: NORMAL
BLOOD GROUP ANTIBODIES SERPL: NORMAL
BPU ID: NORMAL
BPU ID: NORMAL
BUN SERPL-MCNC: 31 MG/DL (ref 6–20)
BUN/CREAT SERPL: 17 (ref 12–20)
CALCIUM SERPL-MCNC: 9.6 MG/DL (ref 8.5–10.1)
CHLORIDE SERPL-SCNC: 110 MMOL/L (ref 97–108)
CO2 SERPL-SCNC: 22 MMOL/L (ref 21–32)
CREAT SERPL-MCNC: 1.87 MG/DL (ref 0.7–1.3)
CROSSMATCH RESULT: NORMAL
CROSSMATCH RESULT: NORMAL
ERYTHROCYTE [DISTWIDTH] IN BLOOD BY AUTOMATED COUNT: 13.6 % (ref 11.5–14.5)
GLUCOSE BLD STRIP.AUTO-MCNC: 191 MG/DL (ref 65–117)
GLUCOSE BLD STRIP.AUTO-MCNC: 193 MG/DL (ref 65–117)
GLUCOSE BLD STRIP.AUTO-MCNC: 207 MG/DL (ref 65–117)
GLUCOSE BLD STRIP.AUTO-MCNC: 256 MG/DL (ref 65–117)
GLUCOSE SERPL-MCNC: 201 MG/DL (ref 65–100)
HCT VFR BLD AUTO: 34.1 % (ref 36.6–50.3)
HGB BLD-MCNC: 10.8 G/DL (ref 12.1–17)
MAGNESIUM SERPL-MCNC: 2.5 MG/DL (ref 1.6–2.4)
MCH RBC QN AUTO: 29.5 PG (ref 26–34)
MCHC RBC AUTO-ENTMCNC: 31.7 G/DL (ref 30–36.5)
MCV RBC AUTO: 93.2 FL (ref 80–99)
NRBC # BLD: 0 K/UL (ref 0–0.01)
NRBC BLD-RTO: 0 PER 100 WBC
PLATELET # BLD AUTO: 114 K/UL (ref 150–400)
PMV BLD AUTO: 9.8 FL (ref 8.9–12.9)
POTASSIUM SERPL-SCNC: 4.4 MMOL/L (ref 3.5–5.1)
RBC # BLD AUTO: 3.66 M/UL (ref 4.1–5.7)
SERVICE CMNT-IMP: ABNORMAL
SODIUM SERPL-SCNC: 136 MMOL/L (ref 136–145)
SPECIMEN EXP DATE BLD: NORMAL
UNIT DIVISION: 0
UNIT DIVISION: 0
WBC # BLD AUTO: 7 K/UL (ref 4.1–11.1)

## 2024-01-19 PROCEDURE — 6360000002 HC RX W HCPCS

## 2024-01-19 PROCEDURE — 80048 BASIC METABOLIC PNL TOTAL CA: CPT

## 2024-01-19 PROCEDURE — P9045 ALBUMIN (HUMAN), 5%, 250 ML: HCPCS | Performed by: PHYSICIAN ASSISTANT

## 2024-01-19 PROCEDURE — 6360000002 HC RX W HCPCS: Performed by: NURSE PRACTITIONER

## 2024-01-19 PROCEDURE — 2060000000 HC ICU INTERMEDIATE R&B

## 2024-01-19 PROCEDURE — 97166 OT EVAL MOD COMPLEX 45 MIN: CPT

## 2024-01-19 PROCEDURE — 6370000000 HC RX 637 (ALT 250 FOR IP)

## 2024-01-19 PROCEDURE — 6370000000 HC RX 637 (ALT 250 FOR IP): Performed by: THORACIC SURGERY (CARDIOTHORACIC VASCULAR SURGERY)

## 2024-01-19 PROCEDURE — 51798 US URINE CAPACITY MEASURE: CPT

## 2024-01-19 PROCEDURE — 2500000003 HC RX 250 WO HCPCS

## 2024-01-19 PROCEDURE — 97535 SELF CARE MNGMENT TRAINING: CPT

## 2024-01-19 PROCEDURE — 6370000000 HC RX 637 (ALT 250 FOR IP): Performed by: CLINICAL NURSE SPECIALIST

## 2024-01-19 PROCEDURE — 85027 COMPLETE CBC AUTOMATED: CPT

## 2024-01-19 PROCEDURE — 2500000003 HC RX 250 WO HCPCS: Performed by: NURSE PRACTITIONER

## 2024-01-19 PROCEDURE — 99231 SBSQ HOSP IP/OBS SF/LOW 25: CPT | Performed by: CLINICAL NURSE SPECIALIST

## 2024-01-19 PROCEDURE — 97162 PT EVAL MOD COMPLEX 30 MIN: CPT

## 2024-01-19 PROCEDURE — 83735 ASSAY OF MAGNESIUM: CPT

## 2024-01-19 PROCEDURE — 2580000003 HC RX 258: Performed by: NURSE PRACTITIONER

## 2024-01-19 PROCEDURE — 71045 X-RAY EXAM CHEST 1 VIEW: CPT

## 2024-01-19 PROCEDURE — 6360000002 HC RX W HCPCS: Performed by: THORACIC SURGERY (CARDIOTHORACIC VASCULAR SURGERY)

## 2024-01-19 PROCEDURE — 97530 THERAPEUTIC ACTIVITIES: CPT

## 2024-01-19 PROCEDURE — 82962 GLUCOSE BLOOD TEST: CPT

## 2024-01-19 PROCEDURE — 36415 COLL VENOUS BLD VENIPUNCTURE: CPT

## 2024-01-19 PROCEDURE — 2580000003 HC RX 258

## 2024-01-19 PROCEDURE — 6360000002 HC RX W HCPCS: Performed by: PHYSICIAN ASSISTANT

## 2024-01-19 RX ORDER — FUROSEMIDE 10 MG/ML
40 INJECTION INTRAMUSCULAR; INTRAVENOUS ONCE
Status: COMPLETED | OUTPATIENT
Start: 2024-01-19 | End: 2024-01-19

## 2024-01-19 RX ORDER — INSULIN GLARGINE 100 [IU]/ML
0.2 INJECTION, SOLUTION SUBCUTANEOUS DAILY
Status: DISCONTINUED | OUTPATIENT
Start: 2024-01-20 | End: 2024-01-20

## 2024-01-19 RX ORDER — ALBUMIN, HUMAN INJ 5% 5 %
12.5 SOLUTION INTRAVENOUS ONCE
Status: COMPLETED | OUTPATIENT
Start: 2024-01-19 | End: 2024-01-19

## 2024-01-19 RX ORDER — INSULIN LISPRO 100 [IU]/ML
0.05 INJECTION, SOLUTION INTRAVENOUS; SUBCUTANEOUS
Status: DISCONTINUED | OUTPATIENT
Start: 2024-01-19 | End: 2024-01-27 | Stop reason: HOSPADM

## 2024-01-19 RX ORDER — INSULIN GLARGINE 100 [IU]/ML
9 INJECTION, SOLUTION SUBCUTANEOUS ONCE
Status: COMPLETED | OUTPATIENT
Start: 2024-01-19 | End: 2024-01-19

## 2024-01-19 RX ORDER — INSULIN GLARGINE 100 [IU]/ML
12 INJECTION, SOLUTION SUBCUTANEOUS DAILY
Status: DISCONTINUED | OUTPATIENT
Start: 2024-01-20 | End: 2024-01-19

## 2024-01-19 RX ORDER — ALBUMIN, HUMAN INJ 5% 5 %
25 SOLUTION INTRAVENOUS ONCE
Status: DISCONTINUED | OUTPATIENT
Start: 2024-01-19 | End: 2024-01-19

## 2024-01-19 RX ORDER — CASTOR OIL AND BALSAM, PERU 788; 87 MG/G; MG/G
OINTMENT TOPICAL 3 TIMES DAILY
Status: DISCONTINUED | OUTPATIENT
Start: 2024-01-20 | End: 2024-01-27 | Stop reason: HOSPADM

## 2024-01-19 RX ADMIN — OXYCODONE 10 MG: 5 TABLET ORAL at 18:30

## 2024-01-19 RX ADMIN — HYDROMORPHONE HYDROCHLORIDE 0.5 MG: 1 INJECTION, SOLUTION INTRAMUSCULAR; INTRAVENOUS; SUBCUTANEOUS at 06:27

## 2024-01-19 RX ADMIN — CHLORHEXIDINE GLUCONATE, 0.12% ORAL RINSE 15 ML: 1.2 SOLUTION DENTAL at 09:39

## 2024-01-19 RX ADMIN — ALBUMIN (HUMAN) 12.5 G: 12.5 INJECTION, SOLUTION INTRAVENOUS at 21:30

## 2024-01-19 RX ADMIN — INSULIN LISPRO 2 UNITS: 100 INJECTION, SOLUTION INTRAVENOUS; SUBCUTANEOUS at 17:49

## 2024-01-19 RX ADMIN — ACETAMINOPHEN 975 MG: 325 TABLET ORAL at 06:27

## 2024-01-19 RX ADMIN — MUPIROCIN: 20 OINTMENT TOPICAL at 22:47

## 2024-01-19 RX ADMIN — ACETAMINOPHEN 975 MG: 325 TABLET ORAL at 12:20

## 2024-01-19 RX ADMIN — MUPIROCIN: 20 OINTMENT TOPICAL at 09:40

## 2024-01-19 RX ADMIN — POLYETHYLENE GLYCOL 3350 17 G: 17 POWDER, FOR SOLUTION ORAL at 09:39

## 2024-01-19 RX ADMIN — Medication 400 MG: at 09:40

## 2024-01-19 RX ADMIN — ASPIRIN 81 MG: 81 TABLET, COATED ORAL at 09:40

## 2024-01-19 RX ADMIN — DOCUSATE SODIUM AND SENNOSIDES 1 TABLET: 8.6; 5 TABLET, FILM COATED ORAL at 09:40

## 2024-01-19 RX ADMIN — TAMSULOSIN HYDROCHLORIDE 0.4 MG: 0.4 CAPSULE ORAL at 22:55

## 2024-01-19 RX ADMIN — CHLORHEXIDINE GLUCONATE, 0.12% ORAL RINSE 15 ML: 1.2 SOLUTION DENTAL at 21:03

## 2024-01-19 RX ADMIN — ACETAMINOPHEN 975 MG: 325 TABLET ORAL at 00:02

## 2024-01-19 RX ADMIN — INSULIN LISPRO 4 UNITS: 100 INJECTION, SOLUTION INTRAVENOUS; SUBCUTANEOUS at 17:50

## 2024-01-19 RX ADMIN — LOVASTATIN 80 MG: 40 TABLET ORAL at 22:45

## 2024-01-19 RX ADMIN — DOCUSATE SODIUM AND SENNOSIDES 1 TABLET: 8.6; 5 TABLET, FILM COATED ORAL at 22:55

## 2024-01-19 RX ADMIN — SODIUM CHLORIDE, PRESERVATIVE FREE 40 ML: 5 INJECTION INTRAVENOUS at 20:00

## 2024-01-19 RX ADMIN — AMIODARONE HYDROCHLORIDE 150 MG: 1.5 INJECTION, SOLUTION INTRAVENOUS at 11:38

## 2024-01-19 RX ADMIN — MELATONIN 6 MG: at 22:55

## 2024-01-19 RX ADMIN — FAMOTIDINE 20 MG: 20 TABLET, FILM COATED ORAL at 09:40

## 2024-01-19 RX ADMIN — ACETAMINOPHEN 975 MG: 325 TABLET ORAL at 18:27

## 2024-01-19 RX ADMIN — METOPROLOL TARTRATE 12.5 MG: 25 TABLET, FILM COATED ORAL at 09:49

## 2024-01-19 RX ADMIN — Medication 400 MG: at 22:55

## 2024-01-19 RX ADMIN — AMIODARONE HYDROCHLORIDE 0.5 MG/MIN: 50 INJECTION, SOLUTION INTRAVENOUS at 22:45

## 2024-01-19 RX ADMIN — METOPROLOL TARTRATE 12.5 MG: 25 TABLET, FILM COATED ORAL at 22:51

## 2024-01-19 RX ADMIN — OXYCODONE 10 MG: 5 TABLET ORAL at 03:49

## 2024-01-19 RX ADMIN — SODIUM CHLORIDE, PRESERVATIVE FREE 10 ML: 5 INJECTION INTRAVENOUS at 09:40

## 2024-01-19 RX ADMIN — AMIODARONE HYDROCHLORIDE 1 MG/MIN: 50 INJECTION, SOLUTION INTRAVENOUS at 11:50

## 2024-01-19 RX ADMIN — INSULIN GLARGINE 9 UNITS: 100 INJECTION, SOLUTION SUBCUTANEOUS at 17:49

## 2024-01-19 RX ADMIN — FUROSEMIDE 40 MG: 10 INJECTION, SOLUTION INTRAMUSCULAR; INTRAVENOUS at 23:25

## 2024-01-19 RX ADMIN — ONDANSETRON 4 MG: 2 INJECTION INTRAMUSCULAR; INTRAVENOUS at 20:44

## 2024-01-19 RX ADMIN — FUROSEMIDE 40 MG: 10 INJECTION, SOLUTION INTRAMUSCULAR; INTRAVENOUS at 09:39

## 2024-01-19 RX ADMIN — INSULIN LISPRO 4 UNITS: 100 INJECTION, SOLUTION INTRAVENOUS; SUBCUTANEOUS at 12:20

## 2024-01-19 RX ADMIN — OXYCODONE 5 MG: 5 TABLET ORAL at 12:21

## 2024-01-19 RX ADMIN — INSULIN GLARGINE 8 UNITS: 100 INJECTION, SOLUTION SUBCUTANEOUS at 09:39

## 2024-01-19 ASSESSMENT — PAIN DESCRIPTION - LOCATION
LOCATION: INCISION
LOCATION: CHEST;INCISION

## 2024-01-19 ASSESSMENT — PAIN SCALES - GENERAL
PAINLEVEL_OUTOF10: 0
PAINLEVEL_OUTOF10: 0
PAINLEVEL_OUTOF10: 2
PAINLEVEL_OUTOF10: 7
PAINLEVEL_OUTOF10: 8
PAINLEVEL_OUTOF10: 9
PAINLEVEL_OUTOF10: 0
PAINLEVEL_OUTOF10: 5

## 2024-01-19 ASSESSMENT — PAIN DESCRIPTION - DESCRIPTORS
DESCRIPTORS: ACHING;SORE
DESCRIPTORS: ACHING

## 2024-01-19 NOTE — CARDIO/PULMONARY
Chart reviewed: Patient is 78 y.o. male admitted with Chest pain [R07.9]  NSTEMI (non-ST elevated myocardial infarction) (HCC) [I21.4]    Education: CABG education folder at bedside.  Met with Nicola Draper to review cardiac surgery post discharge instructions and to discuss participation in the Cardiac Rehab Program.     Educated using teach back method. Reviewed the use of bear for sternal support, daily weight and temperature monitoring, showering restrictions, signs and symptoms of infection at surgery sites, daily walking and arm exercises, and use of incentive spirometer.  Reviewed risk factors for CAD to include the following: family history, elevated BMI, hyperlipidemia, hypertension, diabetes, stress, and smoking. Discussed Heart Healthy/Low Sodium (2000 mg.) diet.     Discussed Cardiac Rehab Program format, benefits, and encouraged participation. Pt not interested at this time due to distance but would appreciate a follow up call    General questions answered. Nicola Draper verbalized understanding.       ANTONIO SHI RN

## 2024-01-19 NOTE — PLAN OF CARE
Problem: Occupational Therapy - Adult  Goal: By Discharge: Performs self-care activities at highest level of function for planned discharge setting.  See evaluation for individualized goals.  Description: FUNCTIONAL STATUS PRIOR TO ADMISSION:    Receives Help From: Other (comment) (Girlfriend - Amy Delaney), ADL Assistance: Independent, Homemaking Assistance: Independent, Ambulation Assistance: Independent, Transfer Assistance: Independent, Active : Yes     HOME SUPPORT: Patient lived with s/o but didn't require assistance.    Occupational Therapy Goals:  Initiated 1/19/2024  1.  Patient will perform upper body dressing with Minimal Assist within 7 day(s).  2.  Patient will perform lower body dressing with Moderate Assist within 7 day(s).  3.  Patient will perform toileting with Moderate Assist within 7 day(s).  4.  Patient will perform toilet transfers with Minimal Assist  within 7 day(s).  5.  Patient will perform bed mobility with Minimal Assist within 7 day(s).  6.  Patient will participate in exercises with Stand by Assist within 7 day(s).    7.  Patient will adhere to move in the tube precautions during functional activities with verbal cues within 7 day(s).    Outcome: Progressing   OCCUPATIONAL THERAPY EVALUATION    Patient: Nicola Draper (78 y.o. male)  Date: 1/19/2024  Primary Diagnosis: Chest pain [R07.9]  NSTEMI (non-ST elevated myocardial infarction) (HCC) [I21.4]  Procedure(s) (LRB):  ON PUMP CORONARY ARTERY BYPASS (CABG) x4 WITH ENDOSCOPIC VEIN HARVEST (EVH) AND CARDIOPLEGIA (NO PAC). INTRAOPERATIVE MOOKIE DONE BY DR. GRAJEDA. EVH DONE BY JOSE SHIN. (N/A) 3 Days Post-Op     Precautions: Cardiac, Fall Risk (move in the tube)                  ASSESSMENT :  The patient is limited by decreased functional mobility, independence in ADLs, high-level IADLs, ROM, strength, activity tolerance, endurance, command following, attention/concentration, coordination, balance, increased pain levels.    Based  Rodriguez Anderson MD at [facility] CARDIAC CATH LAB    CORONARY ANGIOPLASTY WITH STENT PLACEMENT  2011    CORONARY ARTERY BYPASS GRAFT N/A 1/16/2024    ON PUMP CORONARY ARTERY BYPASS (CABG) x4 WITH ENDOSCOPIC VEIN HARVEST (EVH) AND CARDIOPLEGIA (NO PAC). INTRAOPERATIVE MOOKIE DONE BY DR. GRAJEDA. EVH DONE BY JOSE SHIN. performed by Nguyễn Maharaj MD at [facility] OPEN HEART    HERNIA REPAIR  2012    PTCA  09/2021    ANTOINE L Circumflex artery Dr. Kelly          Expanded or extensive additional review of patient history:   Lives With: Significant other  Type of Home: House  Home Layout: Two level, Able to Live on Main level with bedroom/bathroom  Home Access: Stairs to enter with rails     Entrance Stairs - Rails: Both  Bathroom Shower/Tub: Walk-in shower  Bathroom Toilet: Handicap height  Bathroom Equipment: Grab bars in shower     Home Equipment: None  Has the patient had two or more falls in the past year or any fall with injury in the past year?: No        Hand Dominance: unknown     EXAMINATION OF PERFORMANCE DEFICITS:    Cognitive/Behavioral Status:  Orientation  Overall Orientation Status: Within Normal Limits  Orientation Level: Oriented to person;Oriented to situation;Oriented to place  Cognition  Overall Cognitive Status: Exceptions  Following Commands: Follows one step commands consistently  Memory: Decreased recall of biographical Information  Problem Solving: Decreased awareness of errors  Initiation: Requires cues for some  Sequencing: Requires cues for some    Skin: sternal incision, rodriguez catheter, chest tube    Edema: moderate LUE    Hearing:   Hearing  Hearing: Within functional limits    Vision/Perceptual:          Vision  Vision: Impaired  Vision Exceptions: Wears glasses at all times       Range of Motion:   AROM: Generally decreased, functional  PROM: Within functional limits      Strength:  Strength: Generally decreased, functional      Coordination:  Coordination: Generally decreased, functional

## 2024-01-19 NOTE — CARE COORDINATION
Transition of Care Plan:    RUR: 16%  Prior Level of Functioning: Independent  Disposition: IPR vs Home wth HH- referral pending with MUNIRA  If SNF or IPR: Date FOC offered:   Date FOC received:   Accepting facility:   Date authorization started with reference number:   Date authorization received and expires:   Follow up appointments: PCP< Specialist  DME needed: pending clinical progress  Transportation at discharge:  BLS to be arranged  IM/IMM Medicare/ letter given:  2nd IM Letter to be given  Is patient a  and connected with [facility]? N/A   If yes, was [place] transfer form completed and [facility] notified?   Caregiver Contact:   Discharge Caregiver contacted prior to discharge? If pt desires  Care Conference needed? none  Barriers to discharge:  Cardiothoracic clearance    CM sent referral to MUNIRA for review for IPR vis careport.  MEETA 1/22/24    With clinical  improvement over weekend then home with HH.     FREDI VickN,RN  Care   [facility]  Phone: [phone]

## 2024-01-19 NOTE — PROGRESS NOTES
0700:  Bedside report received from PERLA Campuzano.    0900:  CTS rounds. Plan to DC MAC and kulwant drains.  DC rodriguez around lunch time after Lasix dose.     1000:  Patient converted to Afib.  HR \"s.  SACHA Baird notified.  Amio bolus and drip ordered.     1100:  R MAC removed.     1120:  Kulwant drains x 4 removed by SACHA Baird.     1300:  Rodriguez Dc'd.     1845:  Patient bladder scan showed 33 ml.

## 2024-01-19 NOTE — PROCEDURES
1115: Ctx4 removed without difficulty. Vaseline guaze and 4x4 applied to site. Pt tolerated well.

## 2024-01-19 NOTE — DIABETES MGMT
[facility]  PROGRAM FOR DIABETES HEALTH  DIABETES MANAGEMENT CONSULT    Consulted by  Obey Lopez PA-C  for advanced nursing evaluation and care for inpatient blood glucose management.    Evaluation and Action Plan   Nicola Draper is a 78 year old gentleman, with nearly controlled Type 2 Diabetes on Jardiance and Glipizide, who is admitted with an NSTEMI and found to have multivessel CAD now s/p IABP placement and emergent CABG x4.  The Program for Diabetes Health has been consulted to assist in glycemic management and advanced diabetes management assessment this admission. His A1C is 7.3%, improved from 11% in October after adding 5mg Glipizide BID and monitoring glucose.  His glucose was significantly elevated at 304 at admission and 10 units glargine was given.  Following CABG, he was started on an insulin gtt for management of diabetes and stress hyperglycemia with excellent control.  He transitioned off the insulin gtt with 7 units of glargine and   correctional insulin.  Glucose remains elevated 180-256 with the opportunity to adjust both basal/bolus insulin.    Action Plan    Basal: Increased to weight based dosing. 0.2units/kg/day: 17 units Lantus daily.  Additional 9 units x1 now  If fasting glucose over 140mg/dl/day: increase to 0.3 units/kg/day:  25 units/day    2. Bolus: Starting 4 units Humalog/consumed meal. Advance by 2 units daily for persistent pre-prandial hyperglycemia.    3. Correction: ACHS    4. Adjusted to carb consistent meal         Diabetes Discharge Plan   Medication  Resume metformin 1000mg BID and Jardiance 25 mg daily (needs new Rx for both)   Additional orders  Will need a FUV with PCP within 1-2 weeks after hospital discharge for ongoing diabetes management   Check glucose BID          Initial Presentation   Nicola Draper is a 78 y.o. male who presented to the ED 1/15/24 with a c/o chest pressure.  Reports pressure today was a 3 out of 10 and felt similar to prior MI pain.

## 2024-01-19 NOTE — PROGRESS NOTES
Cardiac Surgery ICU Progress Note    Admit Date: 2024  POD:  3 Days Post-Op    Procedure:  Procedure(s):  ON PUMP CORONARY ARTERY BYPASS (CABG) x4 WITH ENDOSCOPIC VEIN HARVEST (EVH) AND CARDIOPLEGIA (NO PAC). INTRAOPERATIVE MOOKIE DONE BY DR. GRAJEDA. EVH DONE BY JOSE SHIN.        SubjectiveInterval events:   Patient seen with Dr. Maharaj. Afebrile, on 1L NC. Up in chair. In NSR 90s, no complaints.    After initial rounds, pt went into afib rate 120s.      Objective:   Vitals:  Blood pressure 116/63, pulse 94, temperature 97.9 °F (36.6 °C), temperature source Oral, resp. rate 18, height 1.829 m (6'), weight 84.1 kg (185 lb 6.4 oz), SpO2 96 %.  Temp (24hrs), Av.7 °F (37.1 °C), Min:97.9 °F (36.6 °C), Max:99.6 °F (37.6 °C)      Oxygen Flow Rate: O2 Flow Rate (L/min): 1 L/min    Oxygen Delivery Method: O2 Device: None (Room air)    Byron-Romero  Byron-Romero  CVP (Mean): 6 mmHg    EKG/Rhythm:  nsr/afib    CT Output: 300 ml/last 24 hrs (90 ml last 12 hrs)    CXR: Xray Result (most recent):  XR CHEST PORTABLE 2024    Narrative  EXAM:  XR CHEST PORTABLE    INDICATION: Postop heart surgery    COMPARISON: 2024    TECHNIQUE: Upright portable chest AP view at 0344 hours    FINDINGS: Right IJ catheter overlies the SVC. The style pleural drains are in  satisfactory position. Lungs demonstrate increasing opacity at the left lung  base consistent with left pleural effusion and increasing atelectasis. Right  sided pleural effusion has decreased. Mild interstitial edema pattern has  decreased. There is no pneumothorax.    Impression  1. No pneumothorax  2. Increasing opacities at the left lung base consistent with increasing left  pleural effusion and atelectasis. The pulmonary edema has decreased    Admission Weight: Last Weight   Weight - Scale: 84.4 kg (186 lb 1.1 oz) Weight - Scale: 84.1 kg (185 lb 6.4 oz)     Intake / Output / Drain:  Current Shift: 701 - 1900  In: 240 [P.O.:240]  Out: 10

## 2024-01-20 ENCOUNTER — APPOINTMENT (OUTPATIENT)
Facility: HOSPITAL | Age: 79
DRG: 233 | End: 2024-01-20
Payer: MEDICARE

## 2024-01-20 LAB
AMORPH CRY URNS QL MICRO: ABNORMAL
ANION GAP SERPL CALC-SCNC: 3 MMOL/L (ref 5–15)
APPEARANCE UR: ABNORMAL
BACTERIA URNS QL MICRO: ABNORMAL /HPF
BILIRUB UR QL: NEGATIVE
BUN SERPL-MCNC: 37 MG/DL (ref 6–20)
BUN/CREAT SERPL: 17 (ref 12–20)
CALCIUM SERPL-MCNC: 9.1 MG/DL (ref 8.5–10.1)
CHLORIDE SERPL-SCNC: 108 MMOL/L (ref 97–108)
CO2 SERPL-SCNC: 23 MMOL/L (ref 21–32)
COLOR UR: ABNORMAL
CREAT SERPL-MCNC: 2.24 MG/DL (ref 0.7–1.3)
EPITH CASTS URNS QL MICRO: ABNORMAL /LPF
ERYTHROCYTE [DISTWIDTH] IN BLOOD BY AUTOMATED COUNT: 13.3 % (ref 11.5–14.5)
GLUCOSE BLD STRIP.AUTO-MCNC: 171 MG/DL (ref 65–117)
GLUCOSE BLD STRIP.AUTO-MCNC: 182 MG/DL (ref 65–117)
GLUCOSE BLD STRIP.AUTO-MCNC: 192 MG/DL (ref 65–117)
GLUCOSE BLD STRIP.AUTO-MCNC: 209 MG/DL (ref 65–117)
GLUCOSE SERPL-MCNC: 215 MG/DL (ref 65–100)
GLUCOSE UR STRIP.AUTO-MCNC: 100 MG/DL
HCT VFR BLD AUTO: 32.6 % (ref 36.6–50.3)
HGB BLD-MCNC: 10.4 G/DL (ref 12.1–17)
HGB UR QL STRIP: ABNORMAL
KETONES UR QL STRIP.AUTO: ABNORMAL MG/DL
LEUKOCYTE ESTERASE UR QL STRIP.AUTO: ABNORMAL
MAGNESIUM SERPL-MCNC: 2.4 MG/DL (ref 1.6–2.4)
MCH RBC QN AUTO: 30.1 PG (ref 26–34)
MCHC RBC AUTO-ENTMCNC: 31.9 G/DL (ref 30–36.5)
MCV RBC AUTO: 94.5 FL (ref 80–99)
NITRITE UR QL STRIP.AUTO: NEGATIVE
NRBC # BLD: 0 K/UL (ref 0–0.01)
NRBC BLD-RTO: 0 PER 100 WBC
PH UR STRIP: 5 (ref 5–8)
PLATELET # BLD AUTO: 116 K/UL (ref 150–400)
PMV BLD AUTO: 10.3 FL (ref 8.9–12.9)
POTASSIUM SERPL-SCNC: 3.9 MMOL/L (ref 3.5–5.1)
PROT UR STRIP-MCNC: 30 MG/DL
RBC # BLD AUTO: 3.45 M/UL (ref 4.1–5.7)
RBC #/AREA URNS HPF: ABNORMAL /HPF (ref 0–5)
SERVICE CMNT-IMP: ABNORMAL
SODIUM SERPL-SCNC: 134 MMOL/L (ref 136–145)
SP GR UR REFRACTOMETRY: 1.02
URINE CULTURE IF INDICATED: ABNORMAL
UROBILINOGEN UR QL STRIP.AUTO: 0.2 EU/DL (ref 0.2–1)
WBC # BLD AUTO: 6.5 K/UL (ref 4.1–11.1)
WBC URNS QL MICRO: ABNORMAL /HPF (ref 0–4)

## 2024-01-20 PROCEDURE — 6370000000 HC RX 637 (ALT 250 FOR IP)

## 2024-01-20 PROCEDURE — 2580000003 HC RX 258

## 2024-01-20 PROCEDURE — 2060000000 HC ICU INTERMEDIATE R&B

## 2024-01-20 PROCEDURE — 2500000003 HC RX 250 WO HCPCS

## 2024-01-20 PROCEDURE — 97116 GAIT TRAINING THERAPY: CPT

## 2024-01-20 PROCEDURE — 51798 US URINE CAPACITY MEASURE: CPT

## 2024-01-20 PROCEDURE — 6360000002 HC RX W HCPCS

## 2024-01-20 PROCEDURE — 80048 BASIC METABOLIC PNL TOTAL CA: CPT

## 2024-01-20 PROCEDURE — 97530 THERAPEUTIC ACTIVITIES: CPT

## 2024-01-20 PROCEDURE — 81001 URINALYSIS AUTO W/SCOPE: CPT

## 2024-01-20 PROCEDURE — 82962 GLUCOSE BLOOD TEST: CPT

## 2024-01-20 PROCEDURE — 36415 COLL VENOUS BLD VENIPUNCTURE: CPT

## 2024-01-20 PROCEDURE — A4216 STERILE WATER/SALINE, 10 ML: HCPCS

## 2024-01-20 PROCEDURE — 6360000002 HC RX W HCPCS: Performed by: THORACIC SURGERY (CARDIOTHORACIC VASCULAR SURGERY)

## 2024-01-20 PROCEDURE — 71045 X-RAY EXAM CHEST 1 VIEW: CPT

## 2024-01-20 PROCEDURE — 74018 RADEX ABDOMEN 1 VIEW: CPT

## 2024-01-20 PROCEDURE — 83735 ASSAY OF MAGNESIUM: CPT

## 2024-01-20 PROCEDURE — 85027 COMPLETE CBC AUTOMATED: CPT

## 2024-01-20 RX ORDER — INSULIN GLARGINE 100 [IU]/ML
0.25 INJECTION, SOLUTION SUBCUTANEOUS DAILY
Status: DISCONTINUED | OUTPATIENT
Start: 2024-01-20 | End: 2024-01-22

## 2024-01-20 RX ORDER — ENOXAPARIN SODIUM 100 MG/ML
1 INJECTION SUBCUTANEOUS DAILY
Status: DISCONTINUED | OUTPATIENT
Start: 2024-01-20 | End: 2024-01-22

## 2024-01-20 RX ORDER — MULTIVITAMIN WITH IRON
1 TABLET ORAL DAILY
Status: DISCONTINUED | OUTPATIENT
Start: 2024-01-20 | End: 2024-01-27 | Stop reason: HOSPADM

## 2024-01-20 RX ADMIN — Medication: at 18:00

## 2024-01-20 RX ADMIN — INSULIN LISPRO 2 UNITS: 100 INJECTION, SOLUTION INTRAVENOUS; SUBCUTANEOUS at 17:15

## 2024-01-20 RX ADMIN — SODIUM CHLORIDE, PRESERVATIVE FREE 10 ML: 5 INJECTION INTRAVENOUS at 21:10

## 2024-01-20 RX ADMIN — METOPROLOL TARTRATE 12.5 MG: 25 TABLET, FILM COATED ORAL at 11:28

## 2024-01-20 RX ADMIN — MUPIROCIN: 20 OINTMENT TOPICAL at 09:04

## 2024-01-20 RX ADMIN — ONDANSETRON 4 MG: 2 INJECTION INTRAMUSCULAR; INTRAVENOUS at 21:21

## 2024-01-20 RX ADMIN — ENOXAPARIN SODIUM 90 MG: 100 INJECTION SUBCUTANEOUS at 16:57

## 2024-01-20 RX ADMIN — FAMOTIDINE 20 MG: 10 INJECTION, SOLUTION INTRAVENOUS at 11:40

## 2024-01-20 RX ADMIN — Medication: at 09:04

## 2024-01-20 RX ADMIN — Medication: at 00:00

## 2024-01-20 RX ADMIN — CHLORHEXIDINE GLUCONATE, 0.12% ORAL RINSE 15 ML: 1.2 SOLUTION DENTAL at 21:00

## 2024-01-20 RX ADMIN — OXYCODONE 5 MG: 5 TABLET ORAL at 06:28

## 2024-01-20 RX ADMIN — Medication: at 21:08

## 2024-01-20 RX ADMIN — CHLORHEXIDINE GLUCONATE, 0.12% ORAL RINSE 15 ML: 1.2 SOLUTION DENTAL at 09:08

## 2024-01-20 RX ADMIN — ACETAMINOPHEN 975 MG: 325 TABLET ORAL at 06:28

## 2024-01-20 RX ADMIN — AMIODARONE HYDROCHLORIDE 0.5 MG/MIN: 50 INJECTION, SOLUTION INTRAVENOUS at 15:40

## 2024-01-20 RX ADMIN — MUPIROCIN: 20 OINTMENT TOPICAL at 21:11

## 2024-01-20 RX ADMIN — ONDANSETRON 4 MG: 2 INJECTION INTRAMUSCULAR; INTRAVENOUS at 11:39

## 2024-01-20 RX ADMIN — SODIUM CHLORIDE, PRESERVATIVE FREE 10 ML: 5 INJECTION INTRAVENOUS at 09:08

## 2024-01-20 RX ADMIN — ASPIRIN 81 MG: 81 TABLET, COATED ORAL at 11:29

## 2024-01-20 ASSESSMENT — PAIN SCALES - GENERAL
PAINLEVEL_OUTOF10: 3
PAINLEVEL_OUTOF10: 4
PAINLEVEL_OUTOF10: 3
PAINLEVEL_OUTOF10: 2

## 2024-01-20 ASSESSMENT — PAIN DESCRIPTION - DESCRIPTORS: DESCRIPTORS: PRESSURE

## 2024-01-20 ASSESSMENT — PAIN DESCRIPTION - ORIENTATION: ORIENTATION: MID

## 2024-01-20 ASSESSMENT — PAIN DESCRIPTION - LOCATION: LOCATION: CHEST

## 2024-01-20 NOTE — PROGRESS NOTES
Progress Note      1/20/2024 1:23 PM  NAME: Nicola Draper   MRN:  [id number]   Admit Diagnosis: Chest pain [R07.9]  NSTEMI (non-ST elevated myocardial infarction) (HCC) [I21.4]      Primary Cardiologist: Dr Kelly          Assessment:    Problem list:   NSTEMI, h/o PCI, cath on 1/16/2024 multivessel disease, s/p IABP for CP, underwent emergent CABG with LIMA to LAD, SVG > OM2> L-PL, SVG to PDA on 1/16/2023   Post op Afib   HTN  DM  HLP  Renal insufficiency   Respiratory failure s/p intubation post CABG    Lives with girlfriend, has 5 children from previous marriage, retired  Follows with Dr. Kelly, last seen a year ago          Recommendations:      Cont aspirin   On Amiodarone gtt for post Op Afib > hopefully will convert to NSR >> otherwise can consider OP or IP MOOKIE/DCCV.   On xarelto for AC  On lovastatin   On metoprolol   Lasix as needed   PT/OT . Post OP care     Will see intermittently as needed         [x]        High complexity decision making was performed    Subjective:     HPI:   Had CABG for refractory CP   IABP removed today   Low dose of derik       Objective:      Physical Exam:    Last 24hrs VS reviewed since prior progress note. Most recent are:    BP (!) 101/57   Pulse 88   Temp 98 °F (36.7 °C) (Oral)   Resp 27   Ht 1.829 m (6')   Wt 86.3 kg (190 lb 4.8 oz)   SpO2 92%   BMI 25.81 kg/m²     Intake/Output Summary (Last 24 hours) at 1/20/2024 1323  Last data filed at 1/20/2024 1200  Gross per 24 hour   Intake 1680.76 ml   Output 1115 ml   Net 565.76 ml           General: A+Ox3, no distress   Neck: Supple   Respiratory: No respiratory distress, decreased  Cardiovascular: Irregular rate rhythm   Abdomen: soft, non tender, non distended   Neuro: moves all extremities, oriented x3   Skin: warm and dry   Extremity: no edema, warm to touch        Data Review    Telemetry: Afib       Lab Data Personally Reviewed:    Recent Labs     01/19/24  0351 01/20/24  0300   WBC 7.0 6.5   HGB 10.8*

## 2024-01-20 NOTE — PLAN OF CARE
Problem: Physical Therapy - Adult  Goal: By Discharge: Performs mobility at highest level of function for planned discharge setting.  See evaluation for individualized goals.  Description: FUNCTIONAL STATUS PRIOR TO ADMISSION: Patient was independent and active without use of DME.    HOME SUPPORT PRIOR TO ADMISSION: The patient lived with his girlfriend but did not require assistance. 2-story house but able to stay on 1st floor.    Physical Therapy Goals  Initiated 1/19/2024  1.  Patient will move from supine to sit and sit to supine in bed with contact guard assist within 5 day(s).    2.  Patient will perform sit to stand with contact guard assist within 5 day(s).  3.  Patient will transfer from bed to chair and chair to bed with contact guard assist using the least restrictive device within 5 day(s).  4.  Patient will ambulate with contact guard assist for 300 feet with the least restrictive device within 5 day(s).   5.  Patient will ascend/descend 3 stairs with B handrail(s) with contact guard assist within 5 day(s).  6.  Patient will perform cardiac exercises per protocol with modified independence within 5 days.  7.  Patient will verbally recall and functionally demonstrate mindful-based movements (\"move in the tube\") principles without cues within 5 days.   Outcome: Progressing     PHYSICAL THERAPY TREATMENT    Patient: Nicola Draper (78 y.o. male)  Date: 1/20/2024  Diagnosis: Chest pain [R07.9]  NSTEMI (non-ST elevated myocardial infarction) (HCC) [I21.4] NSTEMI (non-ST elevated myocardial infarction) (HCC)  Procedure(s) (LRB):  ON PUMP CORONARY ARTERY BYPASS (CABG) x4 WITH ENDOSCOPIC VEIN HARVEST (EVH) AND CARDIOPLEGIA (NO PAC). INTRAOPERATIVE MOOKIE DONE BY DR. GRAJEDA. EVH DONE BY JOSE SHIN. (N/A) 4 Days Post-Op  Precautions: Cardiac, Fall Risk (move in the tube)                    ASSESSMENT:  Patient continues to benefit from skilled PT services and is progressing towards goals. Able to progress to short                                                                Pain Rating:  Pain from constipation    Pain Intervention(s):   rest    Activity Tolerance:   Fair  and requires rest breaks    After treatment:   Patient left in no apparent distress in bed, Call bell within reach, Side rails x3, and RN bedside    COMMUNICATION/EDUCATION:   The patient's plan of care was discussed with: registered nurse and rehabilitation technician    Patient Education  Education Given To: Patient  Education Provided: Role of Therapy;Plan of Care;Precautions  Education Provided Comments: cardiac  Education Method: Verbal  Barriers to Learning: None  Education Outcome: Continued education needed      Sindy Dior PT  Minutes: 30

## 2024-01-20 NOTE — PROGRESS NOTES
1910 Bedside shift change report given to PERLA Faria (oncoming nurse) by PERLA Dalton (offgoing nurse). Report included the following information Nurse Handoff Report, Adult Overview, Intake/Output, MAR, Recent Results, and Cardiac Rhythm afib . Pacer set with back up: AAI 40bpm/3mA    1915 Pacer transitioned to VVI 50bpm/10mA due to persistent atrial fib.     2000 Shift assessment completed, see flowsheets for details. Patient Aox4 but minimally engaging with conversation, flat affect. In bed with no complaints of pain at this time, currently on 1L nasal cannula due to SpO2 dropping while sleeping. Severely diminished lung sounds in bilateral bases, with L more diminished than R. Course crackles present in R base.Bowel sounds hyperactive and turbulent in all four quadrants. Abdomen distended and semisoft.   Dual skin assessment completed with Tamar Jo RN. Pressure injuries noted on left posterior/medial thigh and sacrum. BL heels boggy/pink but blanching. Old blood noted at urethral opening. Sternal incision is tender and slightly swollen at the top. BL hemosiderin staining on calves/shins. BL radial pulses and L pedal pulse palpable- all other lower extremity pulses require doppler.   TID venelex ordered per protocol. Wound healing oral supplement ordered per protocol. Turning patient with wedges and pillows to ensure adequate offloading.    2006 Spoke with Nancy at CT surgery call service to page on call provider.    2012 ALEIDA Kelly returned page. Orders for 250cc 5% albumin followed by 40mg IV lasix.    2044 Patient gagging. PRN zofran administered.    2045 Patient vomited 305cc watery, gray emesis. Urinated 105cc elmira/sediment/malodorous urine. Delaying oral meds until nausea abates.    2125 Patient on bedpan attempting to have a bowel movement. Unsuccessful.    8026-5101 Patient bathed at this time. Pacer wires were dangling- cleansed with CHG and new dressing applied with fabric tape and gauze to stabilize

## 2024-01-20 NOTE — PROGRESS NOTES
0700:  bedside report received from PERLA Faria.     1340:  Patient voided 75 ml of urine.  Clean catch obtained for urinalysis. Bladder scanner not working, will try to get PVR.  KUB still not read, called radiology reading room to get it expedited.  Patient still burping, passing flatus and having hiccups.  1 episode of vomiting earlier with movement.  Zofran given.  See flowsheet and MAR.     1420:  Spoke with ALEIDA Lyn regarding KUB report.  Telephone order received to place NGT and hold eliquis and start Lovenox.  Spoke with KATIE, Pharmacy to place correct Lovenox dose.     1515:  Placed NGT, waiting for xray confirmation. Chest tube dressing changed.

## 2024-01-21 ENCOUNTER — APPOINTMENT (OUTPATIENT)
Facility: HOSPITAL | Age: 79
DRG: 233 | End: 2024-01-21
Payer: MEDICARE

## 2024-01-21 VITALS
SYSTOLIC BLOOD PRESSURE: 142 MMHG | BODY MASS INDEX: 26.38 KG/M2 | OXYGEN SATURATION: 94 % | DIASTOLIC BLOOD PRESSURE: 54 MMHG | WEIGHT: 194.8 LBS | RESPIRATION RATE: 19 BRPM | HEIGHT: 72 IN | TEMPERATURE: 98.4 F | HEART RATE: 90 BPM

## 2024-01-21 LAB
ANION GAP SERPL CALC-SCNC: 8 MMOL/L (ref 5–15)
BUN SERPL-MCNC: 43 MG/DL (ref 6–20)
BUN/CREAT SERPL: 17 (ref 12–20)
CALCIUM SERPL-MCNC: 9 MG/DL (ref 8.5–10.1)
CHLORIDE SERPL-SCNC: 108 MMOL/L (ref 97–108)
CO2 SERPL-SCNC: 21 MMOL/L (ref 21–32)
CREAT SERPL-MCNC: 2.51 MG/DL (ref 0.7–1.3)
ERYTHROCYTE [DISTWIDTH] IN BLOOD BY AUTOMATED COUNT: 13.5 % (ref 11.5–14.5)
GLUCOSE BLD STRIP.AUTO-MCNC: 164 MG/DL (ref 65–117)
GLUCOSE BLD STRIP.AUTO-MCNC: 180 MG/DL (ref 65–117)
GLUCOSE BLD STRIP.AUTO-MCNC: 182 MG/DL (ref 65–117)
GLUCOSE BLD STRIP.AUTO-MCNC: 187 MG/DL (ref 65–117)
GLUCOSE SERPL-MCNC: 177 MG/DL (ref 65–100)
HCT VFR BLD AUTO: 32 % (ref 36.6–50.3)
HGB BLD-MCNC: 10.2 G/DL (ref 12.1–17)
MAGNESIUM SERPL-MCNC: 2.6 MG/DL (ref 1.6–2.4)
MCH RBC QN AUTO: 29.1 PG (ref 26–34)
MCHC RBC AUTO-ENTMCNC: 31.9 G/DL (ref 30–36.5)
MCV RBC AUTO: 91.2 FL (ref 80–99)
NRBC # BLD: 0 K/UL (ref 0–0.01)
NRBC BLD-RTO: 0 PER 100 WBC
PLATELET # BLD AUTO: 151 K/UL (ref 150–400)
PMV BLD AUTO: 10.1 FL (ref 8.9–12.9)
POTASSIUM SERPL-SCNC: 3.6 MMOL/L (ref 3.5–5.1)
RBC # BLD AUTO: 3.51 M/UL (ref 4.1–5.7)
SERVICE CMNT-IMP: ABNORMAL
SODIUM SERPL-SCNC: 137 MMOL/L (ref 136–145)
WBC # BLD AUTO: 5.4 K/UL (ref 4.1–11.1)

## 2024-01-21 PROCEDURE — 2580000003 HC RX 258

## 2024-01-21 PROCEDURE — 6360000002 HC RX W HCPCS: Performed by: THORACIC SURGERY (CARDIOTHORACIC VASCULAR SURGERY)

## 2024-01-21 PROCEDURE — 85027 COMPLETE CBC AUTOMATED: CPT

## 2024-01-21 PROCEDURE — 6370000000 HC RX 637 (ALT 250 FOR IP)

## 2024-01-21 PROCEDURE — 2500000003 HC RX 250 WO HCPCS

## 2024-01-21 PROCEDURE — 83735 ASSAY OF MAGNESIUM: CPT

## 2024-01-21 PROCEDURE — 51798 US URINE CAPACITY MEASURE: CPT

## 2024-01-21 PROCEDURE — A4216 STERILE WATER/SALINE, 10 ML: HCPCS

## 2024-01-21 PROCEDURE — 36415 COLL VENOUS BLD VENIPUNCTURE: CPT

## 2024-01-21 PROCEDURE — 71045 X-RAY EXAM CHEST 1 VIEW: CPT

## 2024-01-21 PROCEDURE — 97110 THERAPEUTIC EXERCISES: CPT

## 2024-01-21 PROCEDURE — 2060000000 HC ICU INTERMEDIATE R&B

## 2024-01-21 PROCEDURE — 80048 BASIC METABOLIC PNL TOTAL CA: CPT

## 2024-01-21 PROCEDURE — 6360000002 HC RX W HCPCS

## 2024-01-21 PROCEDURE — 82962 GLUCOSE BLOOD TEST: CPT

## 2024-01-21 PROCEDURE — 2700000000 HC OXYGEN THERAPY PER DAY

## 2024-01-21 PROCEDURE — 97116 GAIT TRAINING THERAPY: CPT

## 2024-01-21 RX ORDER — METOCLOPRAMIDE HYDROCHLORIDE 5 MG/ML
10 INJECTION INTRAMUSCULAR; INTRAVENOUS EVERY 6 HOURS
Status: DISCONTINUED | OUTPATIENT
Start: 2024-01-21 | End: 2024-01-21

## 2024-01-21 RX ORDER — HYDROMORPHONE HYDROCHLORIDE 1 MG/ML
0.25 INJECTION, SOLUTION INTRAMUSCULAR; INTRAVENOUS; SUBCUTANEOUS EVERY 4 HOURS PRN
Status: DISCONTINUED | OUTPATIENT
Start: 2024-01-21 | End: 2024-01-27 | Stop reason: HOSPADM

## 2024-01-21 RX ORDER — METOCLOPRAMIDE HYDROCHLORIDE 5 MG/ML
5 INJECTION INTRAMUSCULAR; INTRAVENOUS EVERY 6 HOURS
Status: COMPLETED | OUTPATIENT
Start: 2024-01-21 | End: 2024-01-22

## 2024-01-21 RX ORDER — POTASSIUM CHLORIDE 7.45 MG/ML
10 INJECTION INTRAVENOUS
Status: COMPLETED | OUTPATIENT
Start: 2024-01-21 | End: 2024-01-21

## 2024-01-21 RX ADMIN — METOCLOPRAMIDE 5 MG: 5 INJECTION, SOLUTION INTRAMUSCULAR; INTRAVENOUS at 20:36

## 2024-01-21 RX ADMIN — AMIODARONE HYDROCHLORIDE 0.5 MG/MIN: 50 INJECTION, SOLUTION INTRAVENOUS at 08:04

## 2024-01-21 RX ADMIN — POTASSIUM CHLORIDE 10 MEQ: 10 INJECTION, SOLUTION INTRAVENOUS at 08:11

## 2024-01-21 RX ADMIN — HYDROMORPHONE HYDROCHLORIDE 0.5 MG: 1 INJECTION, SOLUTION INTRAMUSCULAR; INTRAVENOUS; SUBCUTANEOUS at 03:35

## 2024-01-21 RX ADMIN — METOCLOPRAMIDE 5 MG: 5 INJECTION, SOLUTION INTRAMUSCULAR; INTRAVENOUS at 14:23

## 2024-01-21 RX ADMIN — FAMOTIDINE 20 MG: 10 INJECTION, SOLUTION INTRAVENOUS at 08:01

## 2024-01-21 RX ADMIN — POTASSIUM CHLORIDE 10 MEQ: 10 INJECTION, SOLUTION INTRAVENOUS at 10:49

## 2024-01-21 RX ADMIN — ASPIRIN 81 MG: 81 TABLET, COATED ORAL at 08:13

## 2024-01-21 RX ADMIN — MUPIROCIN: 20 OINTMENT TOPICAL at 08:46

## 2024-01-21 RX ADMIN — Medication: at 14:24

## 2024-01-21 RX ADMIN — CHLORHEXIDINE GLUCONATE, 0.12% ORAL RINSE 15 ML: 1.2 SOLUTION DENTAL at 20:36

## 2024-01-21 RX ADMIN — POTASSIUM CHLORIDE 10 MEQ: 10 INJECTION, SOLUTION INTRAVENOUS at 12:12

## 2024-01-21 RX ADMIN — ENOXAPARIN SODIUM 90 MG: 100 INJECTION SUBCUTANEOUS at 08:42

## 2024-01-21 RX ADMIN — SODIUM CHLORIDE, PRESERVATIVE FREE 10 ML: 5 INJECTION INTRAVENOUS at 20:48

## 2024-01-21 RX ADMIN — POTASSIUM CHLORIDE 10 MEQ: 10 INJECTION, SOLUTION INTRAVENOUS at 09:33

## 2024-01-21 RX ADMIN — METOPROLOL TARTRATE 12.5 MG: 25 TABLET, FILM COATED ORAL at 08:11

## 2024-01-21 RX ADMIN — Medication: at 08:46

## 2024-01-21 RX ADMIN — HYDROMORPHONE HYDROCHLORIDE 0.25 MG: 1 INJECTION, SOLUTION INTRAMUSCULAR; INTRAVENOUS; SUBCUTANEOUS at 09:39

## 2024-01-21 RX ADMIN — SODIUM CHLORIDE, PRESERVATIVE FREE 10 ML: 5 INJECTION INTRAVENOUS at 08:01

## 2024-01-21 RX ADMIN — Medication: at 20:28

## 2024-01-21 RX ADMIN — METOCLOPRAMIDE 10 MG: 5 INJECTION, SOLUTION INTRAMUSCULAR; INTRAVENOUS at 08:42

## 2024-01-21 ASSESSMENT — PAIN - FUNCTIONAL ASSESSMENT: PAIN_FUNCTIONAL_ASSESSMENT: PREVENTS OR INTERFERES SOME ACTIVE ACTIVITIES AND ADLS

## 2024-01-21 ASSESSMENT — PAIN DESCRIPTION - DESCRIPTORS
DESCRIPTORS: ACHING
DESCRIPTORS: ACHING

## 2024-01-21 ASSESSMENT — PAIN SCALES - GENERAL
PAINLEVEL_OUTOF10: 0
PAINLEVEL_OUTOF10: 0
PAINLEVEL_OUTOF10: 7
PAINLEVEL_OUTOF10: 3
PAINLEVEL_OUTOF10: 2
PAINLEVEL_OUTOF10: 0
PAINLEVEL_OUTOF10: 4
PAINLEVEL_OUTOF10: 2

## 2024-01-21 ASSESSMENT — PAIN DESCRIPTION - PAIN TYPE: TYPE: ACUTE PAIN

## 2024-01-21 ASSESSMENT — PAIN DESCRIPTION - ORIENTATION
ORIENTATION: ANTERIOR;RIGHT
ORIENTATION: MID

## 2024-01-21 ASSESSMENT — PAIN DESCRIPTION - LOCATION
LOCATION: CHEST
LOCATION: ABDOMEN

## 2024-01-21 ASSESSMENT — PAIN DESCRIPTION - FREQUENCY: FREQUENCY: CONTINUOUS

## 2024-01-21 ASSESSMENT — PAIN DESCRIPTION - ONSET: ONSET: ON-GOING

## 2024-01-21 NOTE — PROGRESS NOTES
1905 Bedside shift change report given to PERLA Faria (oncoming nurse) by PERLA Dalton (offgoing nurse). Report included the following information Nurse Handoff Report, Adult Overview, Intake/Output, MAR, and Cardiac Rhythm afib .     1945 Patient only voided 125 cc on dayshift. Concerns for retention- bladder scanned at this time, max scan 464cc. Assisted patient with urinal.     1959 Followed up with Radiology reading room regarding stat xray from 1528 that has not been read.     2000 Shift assessment completed, see flowsheets for details. Patient is very tired today, Aox4, resting comfortably on room air in the bed. Pressure injures on sacrum and L medial/posterior thigh. Attempted to assess placement of NG tube- unable to express air into the tube. There is a bouncing resistance, but distant, soft gurgling present until the air is rebounding back into the syringe. Patient voided 210cc elmira, hazy urine voided. Post void residual scan showed 263cc remaining in bladder. L groin incision previously sealed with skin glue is partially open,.    2015 ALEIDA Kelly on the phone to notify that the NG tube is coiled in the patient's esophagus- need to remove it and try to place a new one. Patient is NPO- went through the oral meds scheduled and approved to hold all of them. Notified of PVR- orders to straight cath patient once and if he still is retaining afterwards then place a rodriguez.    2018 NG tube removed.    2020 Patient's step-son and his wife at bedside. Updates given and opportunity for questions provided.    2027 SACHA Sullivan (hospitalist) called to pass along x-ray results that were incorrectly relayed to her. She was not consulted on this patient and wanted to ensure information was correctly relayed.    2105-2111 Patient straight cathed - 215cc elmira, heavily sedimented/purulent, malodorous urine with small blood clot removed. The catheter seemed to clog with the sediment before it stopped draining.     2115-2130

## 2024-01-21 NOTE — PROGRESS NOTES
[facility] FLOOR Progress Note    Admit Date: 2024  POD: 5 Days Post-Op      Procedure:  Procedure(s):  ON PUMP CORONARY ARTERY BYPASS (CABG) x4 WITH ENDOSCOPIC VEIN HARVEST (EVH) AND CARDIOPLEGIA (NO PAC). INTRAOPERATIVE MOOKIE DONE BY DR. GRAJEDA. EVH DONE BY JOSE SHIN.      Subjective/overnight events:   Pt seen with Dr. Nichols, up in the chair. In controlled a fib, on 1 liters via nasal cannula, afebrile. Vital signs stable.     NGT ordered yesterday afternoon for ileus. Initial placement was coiled in the stomach and repositioning was unsuccessful. He has not had any further nausea or vomiting, aside from attempts of replacement of NGT. Reports passing gas, active BS, belly is soft.     Weight is up 2.1 kg.     Galloway to be placed per retention protocol.     Objective:     BP 98/60   Pulse 79   Temp 97.9 °F (36.6 °C) (Axillary)   Resp 13   Ht 1.829 m (6')   Wt 88.4 kg (194 lb 12.8 oz)   SpO2 93%   BMI 26.42 kg/m²   Temp (24hrs), Av °F (36.7 °C), Min:97.8 °F (36.6 °C), Max:98.4 °F (36.9 °C)      Last 24hr Input/Output:    Intake/Output Summary (Last 24 hours) at 2024 0731  Last data filed at 2024 0650  Gross per 24 hour   Intake 370.92 ml   Output 1582 ml   Net -1211.08 ml        Chest Tube Output: N/A    EKG/Rhythm:   Encounter Date: 24   EKG 12 Lead   Result Value    Ventricular Rate 60    Atrial Rate 60    P-R Interval 164    QRS Duration 94    Q-T Interval 372    QTc Calculation (Bazett) 372    P Axis -2    R Axis -42    T Axis -14    Diagnosis      Normal sinus rhythm  Left axis deviation  Low voltage QRS  Inferior infarct (cited on or before 2024)  Cannot rule out Anterior infarct (cited on or before 2024)  Abnormal ECG  When compared with ECG of 2024 05:35,  Sinus rhythm has replaced Atrial fibrillation  Serial changes of Anterior infarct present         Oxygen: As above    CXR: Xray Result (most recent):  XR CHEST PORTABLE 2024    Narrative  PORTABLE CHEST

## 2024-01-21 NOTE — PROGRESS NOTES
0700: Bedside report received from PERLA Faria.    0900:  Urinalysis with reflex culture discontinued after discussing with Charge Nurse and SACHA Pinzon.  Patient recently had rodriguez removed on 1/19/24 and not having any symptoms at this time.     1250:  Small amount of serous drainage noted to bottom of sternal incision.  Cleaned area with soap and water and applied a dry dressing.

## 2024-01-21 NOTE — CONSULTS
[facility] ([facility])         Nephrology and Hypertension         Patient seen and examined. Full consult dictated-[id number]    ASSESSMENT:  RODOLFO- likely ischemic ATN in the setting of NSTEMI, component of CHARLOTTE (s/p IV contrast on 1/6 with cath), post op Afib with borderline hypotension post CABG (all contributing factors).  UA from 1/20/2024 shows large blood but minimal RBCs on microscopy, raising the suspicion for rhabdomyolysis.  Renal ultrasound does not show any hydronephrosis.  Do not see any recent CK level    CKD 3B: Baseline CR appears to be around 1.5-1.7 based on chart review.  Likely due to early DN + hypertension.  Patient sees a local nephrologist in [place].  Does not remember much details about his CKD.  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    Horseshoe kidney  Renal mass-lower pole of the left kidney, noted on renal ultrasound; suspicion for RCC  Hypertension  DM-2 with early DN  Mild BPH  Hypermagnesemia  CAD/NSTEMI  Status post CABG  A-fib    He is nonoliguric.  Appears hemodynamically stable at present.  Hoping, CR to plateau soon.  Does not appear to be in any fluid overload.    PLAN:  Supportive care with avoidance of nephrotoxins  Rate/rhythm control as able  Check CK level with a.m. labs  Hold magnesium oxide  IV Lasix as needed for worsening SOB or hypoxia  Monitor urine output  Daily labs  He will need either CT with IV contrast or MRI of the abdomen, as outpatient, once he is stable to evaluate the left renal mass  He will also need urology follow-up  Continue Flomax    D/W patient  Thanks for Renal consult. We will follow patient with you.    Signed by:  Gilbert Anderson MD  Nephrology and HTN

## 2024-01-21 NOTE — PLAN OF CARE
Problem: Physical Therapy - Adult  Goal: By Discharge: Performs mobility at highest level of function for planned discharge setting.  See evaluation for individualized goals.  Description: FUNCTIONAL STATUS PRIOR TO ADMISSION: Patient was independent and active without use of DME.    HOME SUPPORT PRIOR TO ADMISSION: The patient lived with his girlfriend but did not require assistance. 2-story house but able to stay on 1st floor.    Physical Therapy Goals  Initiated 1/19/2024  1.  Patient will move from supine to sit and sit to supine in bed with contact guard assist within 5 day(s).    2.  Patient will perform sit to stand with contact guard assist within 5 day(s).  3.  Patient will transfer from bed to chair and chair to bed with contact guard assist using the least restrictive device within 5 day(s).  4.  Patient will ambulate with contact guard assist for 300 feet with the least restrictive device within 5 day(s).   5.  Patient will ascend/descend 3 stairs with B handrail(s) with contact guard assist within 5 day(s).  6.  Patient will perform cardiac exercises per protocol with modified independence within 5 days.  7.  Patient will verbally recall and functionally demonstrate mindful-based movements (\"move in the tube\") principles without cues within 5 days.   Outcome: Progressing   PHYSICAL THERAPY TREATMENT    Patient: Nicola Draper (78 y.o. male)  Date: 1/21/2024  Diagnosis: Chest pain [R07.9]  NSTEMI (non-ST elevated myocardial infarction) (HCC) [I21.4] NSTEMI (non-ST elevated myocardial infarction) (HCC)  Procedure(s) (LRB):  ON PUMP CORONARY ARTERY BYPASS (CABG) x4 WITH ENDOSCOPIC VEIN HARVEST (EVH) AND CARDIOPLEGIA (NO PAC). INTRAOPERATIVE MOOKIE DONE BY DR. GRAJEDA. EVH DONE BY JOSE SHIN. (N/A) 5 Days Post-Op  Precautions: Cardiac, Fall Risk (move in the tube)                      ASSESSMENT:  Patient continues to benefit from skilled PT services and is progressing towards goals. Patient received resting

## 2024-01-21 NOTE — PROGRESS NOTES
Progress Note      1/21/2024 10:10 AM  NAME: Nicola Draper   MRN:  [id number]   Admit Diagnosis: Chest pain [R07.9]  NSTEMI (non-ST elevated myocardial infarction) (HCC) [I21.4]      Primary Cardiologist: Dr Kelly          Assessment:    Problem list:   NSTEMI, h/o PCI, cath on 1/16/2024 multivessel disease, s/p IABP for CP, underwent emergent CABG with LIMA to LAD, SVG > OM2> L-PL, SVG to PDA on 1/16/2023   Post op Afib   HTN  DM  HLP  Renal insufficiency   Respiratory failure s/p intubation post CABG    Lives with girlfriend, has 5 children from previous marriage, retired  Follows with Dr. Kelly, last seen a year ago          Recommendations:      Cont aspirin   On Amiodarone gtt for post Op Afib > hopefully will convert to NSR >> otherwise can consider OP or IP MOOKIE/DCCV.   On xarelto for AC  On lovastatin   On metoprolol   Worsening of renal function, renal consult   PT/OT . Post OP care     Will see intermittently as needed         [x]        High complexity decision making was performed    Subjective:     HPI:  No CP or SOB       Objective:      Physical Exam:    Last 24hrs VS reviewed since prior progress note. Most recent are:    BP (!) 95/44   Pulse 92   Temp 97.6 °F (36.4 °C) (Axillary)   Resp 20   Ht 1.829 m (6')   Wt 88.4 kg (194 lb 12.8 oz)   SpO2 96%   BMI 26.42 kg/m²     Intake/Output Summary (Last 24 hours) at 1/21/2024 1010  Last data filed at 1/21/2024 0847  Gross per 24 hour   Intake 448.7 ml   Output 1825 ml   Net -1376.3 ml           General: A+Ox3, no distress   Neck: Supple   Respiratory: No respiratory distress, decreased  Cardiovascular: Irregular rate rhythm   Abdomen: soft, non tender, non distended   Neuro: moves all extremities, oriented x3   Skin: warm and dry   Extremity: no edema, warm to touch        Data Review    Telemetry: Afib       Lab Data Personally Reviewed:    Recent Labs     01/20/24  0300 01/21/24  0344   WBC 6.5 5.4   HGB 10.4* 10.2*   HCT 32.6* 32.0*

## 2024-01-22 ENCOUNTER — APPOINTMENT (OUTPATIENT)
Facility: HOSPITAL | Age: 79
DRG: 233 | End: 2024-01-22
Payer: MEDICARE

## 2024-01-22 PROBLEM — K56.7 ILEUS (HCC): Status: ACTIVE | Noted: 2024-01-22

## 2024-01-22 LAB
ANION GAP SERPL CALC-SCNC: 6 MMOL/L (ref 5–15)
BUN SERPL-MCNC: 42 MG/DL (ref 6–20)
BUN/CREAT SERPL: 19 (ref 12–20)
CALCIUM SERPL-MCNC: 9.3 MG/DL (ref 8.5–10.1)
CHLORIDE SERPL-SCNC: 109 MMOL/L (ref 97–108)
CK SERPL-CCNC: 542 U/L (ref 39–308)
CO2 SERPL-SCNC: 22 MMOL/L (ref 21–32)
CREAT SERPL-MCNC: 2.22 MG/DL (ref 0.7–1.3)
ECHO BSA: 2.07 M2
ERYTHROCYTE [DISTWIDTH] IN BLOOD BY AUTOMATED COUNT: 13.4 % (ref 11.5–14.5)
GLUCOSE BLD STRIP.AUTO-MCNC: 130 MG/DL (ref 65–117)
GLUCOSE BLD STRIP.AUTO-MCNC: 130 MG/DL (ref 65–117)
GLUCOSE BLD STRIP.AUTO-MCNC: 182 MG/DL (ref 65–117)
GLUCOSE BLD STRIP.AUTO-MCNC: 229 MG/DL (ref 65–117)
GLUCOSE SERPL-MCNC: 176 MG/DL (ref 65–100)
HCT VFR BLD AUTO: 31.6 % (ref 36.6–50.3)
HGB BLD-MCNC: 10.4 G/DL (ref 12.1–17)
MAGNESIUM SERPL-MCNC: 2.7 MG/DL (ref 1.6–2.4)
MCH RBC QN AUTO: 29.6 PG (ref 26–34)
MCHC RBC AUTO-ENTMCNC: 32.9 G/DL (ref 30–36.5)
MCV RBC AUTO: 90 FL (ref 80–99)
NRBC # BLD: 0 K/UL (ref 0–0.01)
NRBC BLD-RTO: 0 PER 100 WBC
PLATELET # BLD AUTO: 169 K/UL (ref 150–400)
PMV BLD AUTO: 9.5 FL (ref 8.9–12.9)
POTASSIUM SERPL-SCNC: 4 MMOL/L (ref 3.5–5.1)
RBC # BLD AUTO: 3.51 M/UL (ref 4.1–5.7)
SERVICE CMNT-IMP: ABNORMAL
SODIUM SERPL-SCNC: 137 MMOL/L (ref 136–145)
WBC # BLD AUTO: 5.2 K/UL (ref 4.1–11.1)

## 2024-01-22 PROCEDURE — 97530 THERAPEUTIC ACTIVITIES: CPT

## 2024-01-22 PROCEDURE — 6370000000 HC RX 637 (ALT 250 FOR IP)

## 2024-01-22 PROCEDURE — 2580000003 HC RX 258

## 2024-01-22 PROCEDURE — 97116 GAIT TRAINING THERAPY: CPT

## 2024-01-22 PROCEDURE — 6360000004 HC RX CONTRAST MEDICATION

## 2024-01-22 PROCEDURE — 2500000003 HC RX 250 WO HCPCS

## 2024-01-22 PROCEDURE — 80048 BASIC METABOLIC PNL TOTAL CA: CPT

## 2024-01-22 PROCEDURE — 6370000000 HC RX 637 (ALT 250 FOR IP): Performed by: CLINICAL NURSE SPECIALIST

## 2024-01-22 PROCEDURE — 97535 SELF CARE MNGMENT TRAINING: CPT

## 2024-01-22 PROCEDURE — 71045 X-RAY EXAM CHEST 1 VIEW: CPT

## 2024-01-22 PROCEDURE — 99231 SBSQ HOSP IP/OBS SF/LOW 25: CPT | Performed by: CLINICAL NURSE SPECIALIST

## 2024-01-22 PROCEDURE — 36415 COLL VENOUS BLD VENIPUNCTURE: CPT

## 2024-01-22 PROCEDURE — 6360000002 HC RX W HCPCS

## 2024-01-22 PROCEDURE — 6360000002 HC RX W HCPCS: Performed by: THORACIC SURGERY (CARDIOTHORACIC VASCULAR SURGERY)

## 2024-01-22 PROCEDURE — 97110 THERAPEUTIC EXERCISES: CPT

## 2024-01-22 PROCEDURE — 85027 COMPLETE CBC AUTOMATED: CPT

## 2024-01-22 PROCEDURE — 82550 ASSAY OF CK (CPK): CPT

## 2024-01-22 PROCEDURE — 93971 EXTREMITY STUDY: CPT

## 2024-01-22 PROCEDURE — 99222 1ST HOSP IP/OBS MODERATE 55: CPT | Performed by: SURGERY

## 2024-01-22 PROCEDURE — 2060000000 HC ICU INTERMEDIATE R&B

## 2024-01-22 PROCEDURE — 2500000003 HC RX 250 WO HCPCS: Performed by: THORACIC SURGERY (CARDIOTHORACIC VASCULAR SURGERY)

## 2024-01-22 PROCEDURE — 83735 ASSAY OF MAGNESIUM: CPT

## 2024-01-22 PROCEDURE — A4216 STERILE WATER/SALINE, 10 ML: HCPCS

## 2024-01-22 PROCEDURE — 74176 CT ABD & PELVIS W/O CONTRAST: CPT

## 2024-01-22 PROCEDURE — 82962 GLUCOSE BLOOD TEST: CPT

## 2024-01-22 RX ORDER — ENOXAPARIN SODIUM 100 MG/ML
1 INJECTION SUBCUTANEOUS 2 TIMES DAILY
Status: DISCONTINUED | OUTPATIENT
Start: 2024-01-22 | End: 2024-01-24

## 2024-01-22 RX ORDER — SODIUM CHLORIDE, SODIUM LACTATE, POTASSIUM CHLORIDE, CALCIUM CHLORIDE 600; 310; 30; 20 MG/100ML; MG/100ML; MG/100ML; MG/100ML
INJECTION, SOLUTION INTRAVENOUS CONTINUOUS
Status: DISCONTINUED | OUTPATIENT
Start: 2024-01-22 | End: 2024-01-25

## 2024-01-22 RX ORDER — INSULIN GLARGINE 100 [IU]/ML
14 INJECTION, SOLUTION SUBCUTANEOUS DAILY
Status: DISCONTINUED | OUTPATIENT
Start: 2024-01-22 | End: 2024-01-27 | Stop reason: HOSPADM

## 2024-01-22 RX ORDER — METOPROLOL TARTRATE 1 MG/ML
5 INJECTION, SOLUTION INTRAVENOUS EVERY 6 HOURS
Status: DISCONTINUED | OUTPATIENT
Start: 2024-01-22 | End: 2024-01-23

## 2024-01-22 RX ADMIN — ACETAMINOPHEN 975 MG: 325 TABLET ORAL at 17:08

## 2024-01-22 RX ADMIN — AMIODARONE HYDROCHLORIDE 0.5 MG/MIN: 50 INJECTION, SOLUTION INTRAVENOUS at 01:13

## 2024-01-22 RX ADMIN — METOPROLOL TARTRATE 5 MG: 5 INJECTION INTRAVENOUS at 20:24

## 2024-01-22 RX ADMIN — IOHEXOL 50 ML: 240 INJECTION, SOLUTION INTRATHECAL; INTRAVASCULAR; INTRAVENOUS; ORAL at 14:41

## 2024-01-22 RX ADMIN — FAMOTIDINE 20 MG: 10 INJECTION, SOLUTION INTRAVENOUS at 08:40

## 2024-01-22 RX ADMIN — CHLORHEXIDINE GLUCONATE, 0.12% ORAL RINSE 15 ML: 1.2 SOLUTION DENTAL at 09:48

## 2024-01-22 RX ADMIN — SODIUM CHLORIDE, PRESERVATIVE FREE 10 ML: 5 INJECTION INTRAVENOUS at 08:22

## 2024-01-22 RX ADMIN — Medication: at 20:31

## 2024-01-22 RX ADMIN — CHLORHEXIDINE GLUCONATE, 0.12% ORAL RINSE 15 ML: 1.2 SOLUTION DENTAL at 20:58

## 2024-01-22 RX ADMIN — INSULIN LISPRO 2 UNITS: 100 INJECTION, SOLUTION INTRAVENOUS; SUBCUTANEOUS at 12:57

## 2024-01-22 RX ADMIN — ENOXAPARIN SODIUM 90 MG: 100 INJECTION SUBCUTANEOUS at 08:44

## 2024-01-22 RX ADMIN — TAMSULOSIN HYDROCHLORIDE 0.4 MG: 0.4 CAPSULE ORAL at 20:51

## 2024-01-22 RX ADMIN — SODIUM CHLORIDE, POTASSIUM CHLORIDE, SODIUM LACTATE AND CALCIUM CHLORIDE: 600; 310; 30; 20 INJECTION, SOLUTION INTRAVENOUS at 08:23

## 2024-01-22 RX ADMIN — AMIODARONE HYDROCHLORIDE 0.5 MG/MIN: 50 INJECTION, SOLUTION INTRAVENOUS at 18:16

## 2024-01-22 RX ADMIN — ONDANSETRON 4 MG: 2 INJECTION INTRAMUSCULAR; INTRAVENOUS at 11:08

## 2024-01-22 RX ADMIN — METOPROLOL TARTRATE 5 MG: 5 INJECTION INTRAVENOUS at 08:39

## 2024-01-22 RX ADMIN — INSULIN GLARGINE 14 UNITS: 100 INJECTION, SOLUTION SUBCUTANEOUS at 09:47

## 2024-01-22 RX ADMIN — METOPROLOL TARTRATE 5 MG: 5 INJECTION INTRAVENOUS at 14:43

## 2024-01-22 RX ADMIN — ENOXAPARIN SODIUM 90 MG: 100 INJECTION SUBCUTANEOUS at 20:32

## 2024-01-22 RX ADMIN — Medication: at 14:43

## 2024-01-22 RX ADMIN — METOCLOPRAMIDE 5 MG: 5 INJECTION, SOLUTION INTRAMUSCULAR; INTRAVENOUS at 02:11

## 2024-01-22 RX ADMIN — SODIUM CHLORIDE, PRESERVATIVE FREE 10 ML: 5 INJECTION INTRAVENOUS at 20:51

## 2024-01-22 RX ADMIN — Medication: at 08:20

## 2024-01-22 NOTE — PROGRESS NOTES
0700  Bedside and verbal report from Adwoa Patel RN. Patient up in recliner.     0730 Ninoska Soler NP rounding. Patient notes pain on palpation to lower mid abdominal area (old hernia repair site).    0745 Dr. Maharaj and heart team rounding. Goals:   PT and OT   2.  General surgery consult re: pain at hernia repair site  3.  Gentle hydration (IVF)  4.  Beta blocker (IV lopressor)    0800  Assessment completed.      0900   Vascular tech at bedside for doppler study of left arm. Rosi Braxton NP (Diabetes management) here to evaluate patient status.     1000  PT and OT here to see patient. Dr. Keyes here to see patient. Abdominal CT ordered.     1100  Remains in atrial fib. Report given to Pooja Nguyen RN

## 2024-01-22 NOTE — PLAN OF CARE
hobbies, golfing, sexual activity, vacuuming, fishing, scrubbing the floors, and moving furniture. Patient was given the “Keep Your Move in the Tube” handout to describe each of these activities in detail.     Increase activity tolerance for home, work, and sexual intercourse by pacing self with increasing the arm exercises, sitting duration, frequency OOB, walking, standing, and ADLs. Instructed and indicated understanding of s/s of too much activity, how to respond to s/s safely.    Patient instructed on no asymmetrical reaching over head to ensure BUEs are lifted together above 90* of shoulder flexion.    Pt instructed that they may have to adjust home setup to increase ease with items closer to waist height to prevent deep bending and asymmetrical UE WB/pushing for stabilization during bending.     Toileting: Educated to rotate at the waist having shoulders rotate with waist to perform joe/anal care with Fair understanding.  Instructed if they have continued pain, decreased functional reach with shoulder IR for BM hygiene can use wet wipes and toilet tongs PRN. Reinforced to avoid valsalva maneuvers with all tasks.      Therapeutic Exercises:   Patient instructed on the benefits and demonstrated cardiac exercises while seated  with Minimal Assist. Instructed and indicated understanding on how to progress reps, sets against gravity, pacing through progressive muscle strengthening standing based on surgeon clearance for more weight in prep for basic and instrumental ADLs. Instruction on the use of household items in place of weights as needed.    CARDIAC   EXERCISE    Sets    Reps    Active  Active Assist    Passive  Self ROM    Comments    Shoulder flexion  1  5   [x]                            []                             []                             []                                Shoulder abduction  1  5  [x]                             []                             []                             []

## 2024-01-22 NOTE — CONSULTS
Subjective:       Nicola Draper is a 78 y.o. male who presents for evaluation of periumbilical pain and ileus. Past medical history significant for diabetes, hypertension, CKD, who was transferred from outside hospital for management of acute non-ST elevation MI. He underwent cardiac cath on 01/16/2024 showing multivessel disease. He underwent CABG on 1/16/24. Postoperative course complicated by RODOLFO, A. Fib, and ileus. Patient reports he has not had a bowel movement since prior to admission. He reports mild nausea. No vomiting today. He passes flatus. He reports chronic umbilical pain since his hernia repair in 2011.       Past Medical History:   Diagnosis Date    CAD (coronary artery disease), native coronary artery     MI with RCA stent 2011. NSTEMI with L circumflex stent 2021    Diabetes (HCC)     Hernia of abdominal wall 2012    Hypercholesterolemia     Hypertension     MI, old 2011     Past Surgical History:   Procedure Laterality Date    CARDIAC PROCEDURE N/A 1/16/2024    Left heart cath / coronary angiography performed by Rodriguez Anderson MD at [facility] CARDIAC CATH LAB    CARDIAC PROCEDURE N/A 1/16/2024    Intra-aortic balloon pump insertion performed by Rodriguez Anderson MD at [facility] CARDIAC CATH LAB    CORONARY ANGIOPLASTY WITH STENT PLACEMENT  2011    CORONARY ARTERY BYPASS GRAFT N/A 1/16/2024    ON PUMP CORONARY ARTERY BYPASS (CABG) x4 WITH ENDOSCOPIC VEIN HARVEST (EVH) AND CARDIOPLEGIA (NO PAC). INTRAOPERATIVE MOOKIE DONE BY DR. GRAJEDA. EVH DONE BY JOSE SHIN. performed by Nguyễn Maharaj MD at [facility] OPEN HEART    HERNIA REPAIR  2012    PTCA  09/2021    ANTOINE L Circumflex artery Dr. Kelly     Social History     Socioeconomic History    Marital status:      Spouse name: None    Number of children: None    Years of education: None    Highest education level: None   Tobacco Use    Smoking status: Former     Current packs/day: 0.00     Types: Cigarettes     Quit date: 7/17/1985     Years since quitting:

## 2024-01-22 NOTE — DIABETES MGMT
[facility]  PROGRAM FOR DIABETES HEALTH  DIABETES MANAGEMENT CONSULT    Consulted by  Obey Lopez PA-C  for advanced nursing evaluation and care for inpatient blood glucose management.    Evaluation and Action Plan   Nicola Draper is a 78 year old gentleman, with nearly controlled Type 2 Diabetes on Jardiance and metformin, who is admitted with an NSTEMI and found to have multivessel CAD now s/p IABP placement and emergent CABG x4.  The Program for Diabetes Health has been consulted to assist in glycemic management and advanced diabetes management assessment this admission. His A1C is 7.3%, improved from 11% in October after intentional changes in diet.  His glucose was significantly elevated at 304 at admission and 10 units glargine was given.  Following CABG, he was started on an insulin gtt for management of diabetes and stress hyperglycemia with excellent control.  He transitioned off the insulin gtt with 7 units of glargine and  correctional insulin.  Glucose remained elevated and Lantus was increased to low dose weight based and bolus insulin added.  This weekend was complicated by ileus and he is currently NPO.  All insulin has been held since ileus. Glucose remains elevated this weekend without insulin, 176-215.  Will resume basal insulin at low dose.     Action Plan    Basal: Resume. 0.15 units/kg/day: 14 units Lantus daily. Safe to given when NPO.    2. Correction: ACHS    3. Diet advancement per primary team.  When advanced, please include consistent carbohydrate component of diet (60 grams CHO/meal)- this can be added to clear and full liq diets.        Diabetes Discharge Plan   Medication  Resume metformin 1000mg BID and Jardiance 25 mg daily (needs new Rx for both)  D/C Glipizide off MAR, not taking.   Additional orders  Will need a FUV with PCP within 1-2 weeks after hospital discharge for ongoing diabetes management   Check glucose BID          Initial Presentation   Nicola Draper is a 78 y.o.

## 2024-01-22 NOTE — CONSULTS
[facility]  CONSULTATION    Name:  ROBERT CRISTINA  MR#:  [id number]  :  1945  ACCOUNT #:  [id number]  DATE OF SERVICE:  2024      REQUESTED BY:  Xuan Soler.    REASON FOR CONSULTATION:  Evaluation and management of renal failure.    HISTORY OF PRESENT ILLNESS:  The patient is a 78-year-old male with past medical history significant for diabetes, hypertension, CKD, who was transferred from outside hospital for management of acute non-ST elevation MI.  He underwent cardiac cath on 2024 showing multivessel disease.  He underwent CABG.  He had post of AFib and borderline hypotension.  He has now developed what appears to be RODOLFO on CKD, PCR up to 2.51 today.  His CR on admit was around 1.49 and had progressively gotten worse.    He has known CKD.  He does not remember any details as to what his creatinine are stage of CKD is at.  He sees a nephrologist locally in [place].  He was told \"everything is okay.\"    He is resting comfortably.  He currently denies any acute chest pain, dyspnea, nausea or vomiting.  BP is soft.  He is making decent urine.  He is not on any NSAIDs.    He does report having history of horseshoe kidney with a mass rising from the lower pole concerning for possible renal neoplasm.  Mildly enlarged prostate.    REVIEW OF SYSTEM:  As noted above.  Remainder is negative    Past Medical History:   Diagnosis Date    CAD (coronary artery disease), native coronary artery     MI with RCA stent . NSTEMI with L circumflex stent     Diabetes (HCC)     Hernia of abdominal wall     Hypercholesterolemia     Hypertension     MI, old      Allergies   Allergen Reactions    Atorvastatin Myalgia    Rosuvastatin Myalgia    Simvastatin Myalgia     Prior to Admission Medications   Prescriptions Last Dose Informant Patient Reported? Taking?   Glucose Blood (ACCU-CHEK PAO PLUS VI) 2024  Yes Yes   Lancets MISC 2024  Yes Yes   Sig: Check Glucose

## 2024-01-22 NOTE — PLAN OF CARE
patient sitting up in chair, cleared by nursing to mobilize. Required moderate assistance with additional time and cuing for precautions to perform sit-stand transfer. Reported feeling dizzy initially in standing that dissipated, systolic BP dropped 20 points. Ambulated 10' using rollator and contact guard assist. Noted narrow TIERNEY with slow speed and short steps. BP recovered in sitting. Patient able to ambulate an additional 15'. At end of session patient was left sitting up in chair, call bell within reach, no complaints. Patient remains significantly below his prior level of function and would benefit from intensive, 5x/week therapy upon discharge.         PLAN:  Patient continues to benefit from skilled intervention to address the above impairments.  Continue treatment per established plan of care.    Recommendation for discharge: (in order for the patient to meet his/her long term goals): Therapy 3 hours/day 5-7 days/week    Other factors to consider for discharge: patient's current support system is unable to meet their requirements for physical assistance, impaired cognition, and high risk for falls    IF patient discharges home will need the following DME: rolling walker       SUBJECTIVE:   Patient stated, \"No.\"    OBJECTIVE DATA SUMMARY:   Critical Behavior:  Orientation  Overall Orientation Status: Within Functional Limits  Orientation Level: Oriented X4  Cognition  Overall Cognitive Status: Exceptions  Arousal/Alertness: Delayed responses to stimuli  Following Commands: Follows multistep commands with increased time  Attention Span: Attends with cues to redirect  Memory: Decreased recall of precautions;Decreased short term memory  Safety Judgement: Decreased awareness of need for assistance;Decreased awareness of need for safety  Problem Solving: Decreased awareness of errors  Insights: Decreased awareness of deficits  Initiation: Requires cues for some  Sequencing: Requires cues for some    Functional

## 2024-01-22 NOTE — CONSULTS
New Urology Consult Note    Patient: Nicola Draper MRN: [id number]  SSN: [id number]    YOB: 1945  Age: 78 y.o.  Sex: male            Assessment:     S/p CABG   Acute Urinary retention  L Renal mass on GWEN 2022    Recommendations:     1. Post op urinary retention >1L  will need catheter for 10 - 14 days for bladder decompression . Will add Tamsulosin   2 L Renal mass - OP follow up    He can follow up with VU for VT   Will arrange FU    Urology will sign off for now   Thank you for this consult. Please contact [facility] with any further questions/concerns.    D/w Dr Lynn     History of Present Illness:     Reason for Consult:  Urinary retention     Nicola Draper is seen in consultation for reasons noted above at the request of Nguyễn Maharaj MD.    This is a 78 y.o. male with a history of essential hypertension, type 2 diabetes, CAD,  chronic kidney disease now postop from a three-vessel CABG with postop urinary retention of greater than 1 L.    Patient seen today sitting in chair family at the bedside discussed with patient reason for visit.  Discussed inability to void likely related to postoperative.  Narcotics as well as other complications related to recent surgery.  Discussed with patient will need Galloway for decompression and will also add tamsulosin to with cystoscopy and bladder relaxation.  Encourage patient to mobilize as this would help further some return of function.  Of note chart review from 10/20/2022 renal ultrasound notes horseshoe kidney with mass arising from the left lower pole concerning for possible renal neoplasm for follow-up after an initial postoperative..    Subjective     Past Medical History  Past Medical History:   Diagnosis Date    CAD (coronary artery disease), native coronary artery     MI with RCA stent 2011. NSTEMI with L circumflex stent 2021    Diabetes (HCC)     Hernia of abdominal wall 2012    Hypercholesterolemia

## 2024-01-22 NOTE — PROGRESS NOTES
1915: Bedside and Verbal shift change report given to PERLA Orona (oncoming nurse) by PERLA Dalton (offgoing nurse). Report included the following information Nurse Handoff Report, Index, Intake/Output, MAR, Recent Results, Med Rec Status, Cardiac Rhythm AFIB, Quality Measures, and Neuro Assessment.     1920: pt called out stated \"what in the heck is going on\" pt stating that he was supposed to get up and hasn't been up all day. Per dayshift RN pt has been up to chair several times. Pt saying random things like there is a wedding going on and is confused about the computer screen. Pt is oriented x4 but seems disoriented to the room. I reminded him we moved him to the room overnight and oriented him to the new surroundings. I offered to get pt up to chair but he declined.     2000: Shift assessment complete, pt is A+Ox4 afebrile, follows commands, moves all extrem spontaneously. Pt reports mild abdominal discomfort but does not want pain meds at this time. Pt is on RA, lungs are clear/diminshed bilaterally. Pt is in rate controlled afib, HDS, all pulses palpable BUE non-pitting edema present. Chest tube dressing site is clean, dry and intact. Pacer wires are isolated and taped to chest. The distal portion of MSI is red. There is no drainage present. Abd is distended, semi-soft, BS hyperactive. Pt does report he is passing gas. Hazy yellow urine is draining via ordriguez. See flow sheet for further details    2130: pt remains oriented x4 but hit the call bell right ~10 after I left the room and stated \"where have you been, no one has been in here for hours\"    2200: Pt bathed, full linen change, Sternal incision cleansed with soap and water and CHG. Additional wounds cleansed with CHG and venelex applied.    0000: Reassessment complete. Pt has not had any additional episodes of confusion, remains A+Ox4.    0400: Reassessment complete, pt remains A+Ox4 w/ intermittent confusion    0640: Pt ambulated to scale and placed in

## 2024-01-22 NOTE — PROGRESS NOTES
sacrum: Wound care consult pending and appreciated.   Continue wound care, Venelex and offloading.   Continue multivitamin per protocol and nutritional supplements when able to take PO.   DMII, blood sugars still running above goal: A1C 7.3; on metformin, glipizide, Jardiance PTA. Diabetes Management following- appreciate the recommendations.   Follow up with Diabetes Management for further recs today.   HLD: on lovasatin, has allergy to multiple other statins, pt family brought in.  Holding while NPO and with elevated CK.  Hx hypercalcemia: Has been told to avoid calcium supplements/tums, monitor   Possible BPH: started Flomax, rodriguez discontinued 1/19/24 and replacement 1/21/24.  Will get Urology consult for recs and to facilitate OP follow up- appreciate the assistance.   Left renal mass:  He will need either CT with IV contrast or MRI of the abdomen, as outpatient, once he is stable to evaluate.    Fluid and electrolytes: NPO due to ileus. Gentle IVF (LR at 50).  Maintain K+ >4 and magnesium level >2.   Nutrition:  NPO for now due to ileus.  Activity: OOB all meals and ambulate in halls TID; PT/OT; IS 10-15 x an hour while awake.  Bowel Regimen: Senna, Miralax, prn dulcolax.    GI ppx: Pepcid.  DVT ppx: SCDs and Lovenox.  Dispo:  Pending acceptance by [facility]. Continue stepdown status.   Case discussed with: Primary RN.    Signed By: JAMIN Tsai - NP

## 2024-01-22 NOTE — PROGRESS NOTES
1145: Bedside shift report given to Angus DUNCAN by Lanette DUNCAN.     1200: Shift assessment completed, see flow sheets.     1425: YUN Churchill RN at bedside to assess pt.     1430: Pt ambulated in brannon very short distance with X3 RN assist and rollator. Pt returned to bed.     1445: Pt started oral contrast. CT notified.     1600: Reassessment completed, see flow sheets. Pacer care and sternal incision care done at this time.     1925: Bedside shift report given to Keyon DUNCAN.

## 2024-01-22 NOTE — WOUND CARE
Wound care nurse consult for sacrum and left posterior upper thigh    Chart reviewed and patient assessed    79 y/o CM admitted for NSTEMI and is POD# 6 from CABG x4.   Past Medical History:   Diagnosis Date    CAD (coronary artery disease), native coronary artery     MI with RCA stent 2011. NSTEMI with L circumflex stent 2021    Diabetes (HCC)     Hernia of abdominal wall 2012    Hypercholesterolemia     Hypertension     MI, old 2011     Past Surgical History:   Procedure Laterality Date    CARDIAC PROCEDURE N/A 1/16/2024    Left heart cath / coronary angiography performed by Rodriguez Anderson MD at [facility] CARDIAC CATH LAB    CARDIAC PROCEDURE N/A 1/16/2024    Intra-aortic balloon pump insertion performed by Rodriguez Anderson MD at [facility] CARDIAC CATH LAB    CORONARY ANGIOPLASTY WITH STENT PLACEMENT  2011    CORONARY ARTERY BYPASS GRAFT N/A 1/16/2024    ON PUMP CORONARY ARTERY BYPASS (CABG) x4 WITH ENDOSCOPIC VEIN HARVEST (EVH) AND CARDIOPLEGIA (NO PAC). INTRAOPERATIVE MOOKIE DONE BY DR. GRAJEDA. EVH DONE BY JOSE SHIN. performed by Nguyễn Maharaj MD at [facility] OPEN HEART    HERNIA REPAIR  2012    PTCA  09/2021    ANTOINE L Circumflex artery Dr. Kelly     Patient now being worked up for umbilical hernia due c/o pain and nausea. Patient positive for gas and negative for BM since prior to admit. Patient for CT today and consult to General Surgery.    Patient has blanchable partial thickness loss to sacrum/coccyx area. Patient reports a stinging pain. Small serous blister in area.      Left posterior upper thigh discolored purple/maroon linear lonnie : has some blanchable areas, measures aprox 1.5 x 10.5, injury to soft tissue. Unsure if etiology of injury at this time.      Recommend:    Left posterior upper thigh wound: TID, apply Venelex/BPO ointment to injury.    Sacral/coccyx partial thickness wound that blanches: Apply Venelex/BPO ointment. May cleanse with soap and water as needed,    RAKESH WHITE RN,

## 2024-01-22 NOTE — PROGRESS NOTES
NAME: Nicola Draper        :  1945        MRN:  [id number]          Assessment :    Plan:  RODOLFO-    CKD 3B: Baseline CR appears to be around 1.5-1.7,  Likely due to early DN + hypertension.  Patient sees a local nephrologist in [place].       Horseshoe kidney  Renal mass-lower pole of the left kidney, noted on renal ultrasound; suspicion for RCC  Hypertension  DM-2 with early DN  Mild BPH  Hypermagnesemia  CAD/NSTEMI  Status post CABG  A-fib Creatinine 2.5 to 2.2; non-oliguric; rodriguez; likely ischemic ATN +/- a component of [place]    Renal ultrasound does not show any hydronephrosis.  mildly high CK level     He will need either CT with IV contrast or MRI of the abdomen, as outpatient, once he is stable to evaluate the left renal mass  He will also need urology follow-up       Subjective:     Chief Complaint:  oob in chair. Alert. C/o abdominal pain, hernia. We discussed the above.    Review of Systems:    Symptom Y/N Comments  Symptom Y/N Comments   Fever/Chills    Chest Pain     Poor Appetite    Edema     Cough    Abdominal Pain     Sputum    Joint Pain     SOB/SHELTON    Pruritis/Rash     Nausea/vomit    Tolerating PT/OT     Diarrhea    Tolerating Diet     Constipation    Other       Could not obtain due to:      Objective:     VITALS:   Last 24hrs VS reviewed since prior progress note. Most recent are:  Vitals:    24 0400   BP: 131/62   Pulse: 89   Resp: 14   Temp: 97.7 °F (36.5 °C)   SpO2: 96%       Intake/Output Summary (Last 24 hours) at 2024 0606  Last data filed at 2024 0400  Gross per 24 hour   Intake 912.82 ml   Output 2232 ml   Net -1319.18 ml      Telemetry Reviewed:     PHYSICAL EXAM:  General: NAD      Lab Data Reviewed: (see below)    Medications Reviewed: (see below)    PMH/SH reviewed - no change compared to H&P  ________________________________________________________________________  Care Plan discussed

## 2024-01-23 ENCOUNTER — APPOINTMENT (OUTPATIENT)
Facility: HOSPITAL | Age: 79
DRG: 233 | End: 2024-01-23
Payer: MEDICARE

## 2024-01-23 LAB
ANION GAP SERPL CALC-SCNC: 6 MMOL/L (ref 5–15)
BUN SERPL-MCNC: 40 MG/DL (ref 6–20)
BUN/CREAT SERPL: 22 (ref 12–20)
CALCIUM SERPL-MCNC: 9.3 MG/DL (ref 8.5–10.1)
CHLORIDE SERPL-SCNC: 113 MMOL/L (ref 97–108)
CO2 SERPL-SCNC: 23 MMOL/L (ref 21–32)
CREAT SERPL-MCNC: 1.82 MG/DL (ref 0.7–1.3)
ERYTHROCYTE [DISTWIDTH] IN BLOOD BY AUTOMATED COUNT: 13.5 % (ref 11.5–14.5)
GLUCOSE BLD STRIP.AUTO-MCNC: 140 MG/DL (ref 65–117)
GLUCOSE BLD STRIP.AUTO-MCNC: 161 MG/DL (ref 65–117)
GLUCOSE BLD STRIP.AUTO-MCNC: 183 MG/DL (ref 65–117)
GLUCOSE BLD STRIP.AUTO-MCNC: 186 MG/DL (ref 65–117)
GLUCOSE SERPL-MCNC: 135 MG/DL (ref 65–100)
HCT VFR BLD AUTO: 30.4 % (ref 36.6–50.3)
HGB BLD-MCNC: 10.1 G/DL (ref 12.1–17)
MCH RBC QN AUTO: 30.1 PG (ref 26–34)
MCHC RBC AUTO-ENTMCNC: 33.2 G/DL (ref 30–36.5)
MCV RBC AUTO: 90.5 FL (ref 80–99)
NRBC # BLD: 0 K/UL (ref 0–0.01)
NRBC BLD-RTO: 0 PER 100 WBC
PLATELET # BLD AUTO: 183 K/UL (ref 150–400)
PMV BLD AUTO: 9.3 FL (ref 8.9–12.9)
POTASSIUM SERPL-SCNC: 3.8 MMOL/L (ref 3.5–5.1)
RBC # BLD AUTO: 3.36 M/UL (ref 4.1–5.7)
SERVICE CMNT-IMP: ABNORMAL
SODIUM SERPL-SCNC: 142 MMOL/L (ref 136–145)
WBC # BLD AUTO: 5 K/UL (ref 4.1–11.1)

## 2024-01-23 PROCEDURE — 2100000001 HC CVICU R&B

## 2024-01-23 PROCEDURE — 6370000000 HC RX 637 (ALT 250 FOR IP): Performed by: NURSE PRACTITIONER

## 2024-01-23 PROCEDURE — 92610 EVALUATE SWALLOWING FUNCTION: CPT

## 2024-01-23 PROCEDURE — 6360000002 HC RX W HCPCS: Performed by: THORACIC SURGERY (CARDIOTHORACIC VASCULAR SURGERY)

## 2024-01-23 PROCEDURE — 6370000000 HC RX 637 (ALT 250 FOR IP)

## 2024-01-23 PROCEDURE — 85027 COMPLETE CBC AUTOMATED: CPT

## 2024-01-23 PROCEDURE — 99231 SBSQ HOSP IP/OBS SF/LOW 25: CPT

## 2024-01-23 PROCEDURE — 80048 BASIC METABOLIC PNL TOTAL CA: CPT

## 2024-01-23 PROCEDURE — 36415 COLL VENOUS BLD VENIPUNCTURE: CPT

## 2024-01-23 PROCEDURE — 99231 SBSQ HOSP IP/OBS SF/LOW 25: CPT | Performed by: NURSE PRACTITIONER

## 2024-01-23 PROCEDURE — 97530 THERAPEUTIC ACTIVITIES: CPT

## 2024-01-23 PROCEDURE — 2500000003 HC RX 250 WO HCPCS: Performed by: THORACIC SURGERY (CARDIOTHORACIC VASCULAR SURGERY)

## 2024-01-23 PROCEDURE — 2580000003 HC RX 258

## 2024-01-23 PROCEDURE — 97110 THERAPEUTIC EXERCISES: CPT

## 2024-01-23 PROCEDURE — 97535 SELF CARE MNGMENT TRAINING: CPT

## 2024-01-23 PROCEDURE — 97116 GAIT TRAINING THERAPY: CPT

## 2024-01-23 PROCEDURE — 6370000000 HC RX 637 (ALT 250 FOR IP): Performed by: CLINICAL NURSE SPECIALIST

## 2024-01-23 PROCEDURE — 82962 GLUCOSE BLOOD TEST: CPT

## 2024-01-23 PROCEDURE — 6360000002 HC RX W HCPCS

## 2024-01-23 PROCEDURE — 71045 X-RAY EXAM CHEST 1 VIEW: CPT

## 2024-01-23 RX ORDER — LOVASTATIN 40 MG/1
80 TABLET ORAL NIGHTLY
Status: DISCONTINUED | OUTPATIENT
Start: 2024-01-23 | End: 2024-01-27 | Stop reason: HOSPADM

## 2024-01-23 RX ORDER — CYCLOBENZAPRINE HCL 10 MG
10 TABLET ORAL 3 TIMES DAILY PRN
Status: DISCONTINUED | OUTPATIENT
Start: 2024-01-23 | End: 2024-01-27 | Stop reason: HOSPADM

## 2024-01-23 RX ADMIN — Medication: at 15:28

## 2024-01-23 RX ADMIN — DOCUSATE SODIUM AND SENNOSIDES 1 TABLET: 8.6; 5 TABLET, FILM COATED ORAL at 20:54

## 2024-01-23 RX ADMIN — SODIUM CHLORIDE, PRESERVATIVE FREE 10 ML: 5 INJECTION INTRAVENOUS at 08:31

## 2024-01-23 RX ADMIN — DOCUSATE SODIUM AND SENNOSIDES 1 TABLET: 8.6; 5 TABLET, FILM COATED ORAL at 08:49

## 2024-01-23 RX ADMIN — METOPROLOL TARTRATE 12.5 MG: 25 TABLET, FILM COATED ORAL at 21:00

## 2024-01-23 RX ADMIN — ENOXAPARIN SODIUM 90 MG: 100 INJECTION SUBCUTANEOUS at 20:55

## 2024-01-23 RX ADMIN — ACETAMINOPHEN 975 MG: 325 TABLET ORAL at 11:17

## 2024-01-23 RX ADMIN — THERA TABS 1 TABLET: TAB at 08:50

## 2024-01-23 RX ADMIN — METOPROLOL TARTRATE 5 MG: 5 INJECTION INTRAVENOUS at 08:46

## 2024-01-23 RX ADMIN — CHLORHEXIDINE GLUCONATE, 0.12% ORAL RINSE 15 ML: 1.2 SOLUTION DENTAL at 20:55

## 2024-01-23 RX ADMIN — ASPIRIN 81 MG: 81 TABLET, COATED ORAL at 08:49

## 2024-01-23 RX ADMIN — ENOXAPARIN SODIUM 90 MG: 100 INJECTION SUBCUTANEOUS at 08:44

## 2024-01-23 RX ADMIN — FAMOTIDINE 20 MG: 20 TABLET, FILM COATED ORAL at 08:50

## 2024-01-23 RX ADMIN — MELATONIN 6 MG: at 20:54

## 2024-01-23 RX ADMIN — METOPROLOL TARTRATE 12.5 MG: 25 TABLET, FILM COATED ORAL at 15:29

## 2024-01-23 RX ADMIN — AMIODARONE HYDROCHLORIDE 400 MG: 200 TABLET ORAL at 20:54

## 2024-01-23 RX ADMIN — INSULIN GLARGINE 14 UNITS: 100 INJECTION, SOLUTION SUBCUTANEOUS at 08:42

## 2024-01-23 RX ADMIN — OXYCODONE 10 MG: 5 TABLET ORAL at 20:54

## 2024-01-23 RX ADMIN — Medication: at 20:55

## 2024-01-23 RX ADMIN — SODIUM CHLORIDE, PRESERVATIVE FREE 40 ML: 5 INJECTION INTRAVENOUS at 21:03

## 2024-01-23 RX ADMIN — AMIODARONE HYDROCHLORIDE 400 MG: 200 TABLET ORAL at 15:30

## 2024-01-23 RX ADMIN — CHLORHEXIDINE GLUCONATE, 0.12% ORAL RINSE 15 ML: 1.2 SOLUTION DENTAL at 08:31

## 2024-01-23 RX ADMIN — SODIUM CHLORIDE, POTASSIUM CHLORIDE, SODIUM LACTATE AND CALCIUM CHLORIDE: 600; 310; 30; 20 INJECTION, SOLUTION INTRAVENOUS at 05:06

## 2024-01-23 RX ADMIN — CYCLOBENZAPRINE 10 MG: 10 TABLET, FILM COATED ORAL at 11:17

## 2024-01-23 RX ADMIN — METOPROLOL TARTRATE 5 MG: 5 INJECTION INTRAVENOUS at 02:17

## 2024-01-23 RX ADMIN — AMIODARONE HYDROCHLORIDE 0.5 MG/MIN: 50 INJECTION, SOLUTION INTRAVENOUS at 10:30

## 2024-01-23 RX ADMIN — Medication: at 08:30

## 2024-01-23 RX ADMIN — OXYCODONE 5 MG: 5 TABLET ORAL at 04:15

## 2024-01-23 RX ADMIN — TAMSULOSIN HYDROCHLORIDE 0.4 MG: 0.4 CAPSULE ORAL at 20:55

## 2024-01-23 RX ADMIN — ACETAMINOPHEN 975 MG: 325 TABLET ORAL at 02:13

## 2024-01-23 ASSESSMENT — PAIN SCALES - GENERAL
PAINLEVEL_OUTOF10: 4
PAINLEVEL_OUTOF10: 8
PAINLEVEL_OUTOF10: 6
PAINLEVEL_OUTOF10: 4
PAINLEVEL_OUTOF10: 0
PAINLEVEL_OUTOF10: 3

## 2024-01-23 ASSESSMENT — PAIN DESCRIPTION - DESCRIPTORS
DESCRIPTORS: ACHING
DESCRIPTORS: ACHING
DESCRIPTORS: SORE;ACHING;THROBBING

## 2024-01-23 ASSESSMENT — PAIN DESCRIPTION - ORIENTATION
ORIENTATION: RIGHT;LOWER
ORIENTATION: POSTERIOR;RIGHT
ORIENTATION: POSTERIOR;RIGHT

## 2024-01-23 ASSESSMENT — PAIN DESCRIPTION - LOCATION
LOCATION: BACK

## 2024-01-23 NOTE — PROGRESS NOTES
Topical TID    tamsulosin (FLOMAX) capsule 0.4 mg  0.4 mg Oral QHS    glucose chewable tablet 16 g  4 tablet Oral PRN    dextrose 10 % infusion   IntraVENous Continuous PRN    insulin lispro (HUMALOG) injection vial 0-8 Units  0-8 Units SubCUTAneous TID WC    insulin lispro (HUMALOG) injection vial 0-4 Units  0-4 Units SubCUTAneous Nightly    [Held by provider] magnesium oxide (MAG-OX) tablet 400 mg  400 mg Oral BID    [Held by provider] lovastatin (MEVACOR) tablet 80 mg (Patient Supplied)  80 mg Oral Nightly    sodium chloride flush 0.9 % injection 5-40 mL  5-40 mL IntraVENous 2 times per day    sodium chloride flush 0.9 % injection 5-40 mL  5-40 mL IntraVENous PRN    ondansetron (ZOFRAN) injection 4 mg  4 mg IntraVENous Q4H PRN    aspirin EC tablet 81 mg  81 mg Oral Daily    acetaminophen (TYLENOL) tablet 975 mg  975 mg Oral 4 times per day    lidocaine 4 % external patch 2 patch  2 patch Topical Daily    oxyCODONE (ROXICODONE) immediate release tablet 5 mg  5 mg Oral Q4H PRN    Or    oxyCODONE (ROXICODONE) immediate release tablet 10 mg  10 mg Oral Q4H PRN    HYDROmorphone HCl PF (DILAUDID) injection 0.5 mg  0.5 mg IntraVENous Q4H PRN    [Held by provider] amiodarone (CORDARONE) tablet 400 mg  400 mg Oral BID    chlorhexidine (PERIDEX) 0.12 % solution 15 mL  15 mL Mouth/Throat BID    hydrALAZINE (APRESOLINE) injection 10 mg  10 mg IntraVENous Q6H PRN    diphenhydrAMINE (BENADRYL) capsule 25 mg  25 mg Oral Nightly PRN    polyethylene glycol (GLYCOLAX) packet 17 g  17 g Oral Daily    sennosides-docusate sodium (SENOKOT-S) 8.6-50 MG tablet 1 tablet  1 tablet Oral BID    bisacodyl (DULCOLAX) suppository 10 mg  10 mg Rectal Daily PRN    famotidine (PEPCID) tablet 20 mg  20 mg Oral Daily    Or    famotidine (PEPCID) 20 mg in sodium chloride (PF) 0.9 % 10 mL injection  20 mg IntraVENous Daily    magnesium sulfate 2000 mg in 50 mL IVPB premix  2,000 mg IntraVENous PRN    albuterol (PROVENTIL) (2.5 MG/3ML) 0.083%

## 2024-01-23 NOTE — PROGRESS NOTES
[facility] FLOOR Progress Note    Admit Date: 2024  POD: 7 Days Post-Op      Procedure:  Procedure(s):  ON PUMP CORONARY ARTERY BYPASS (CABG) x4 WITH ENDOSCOPIC VEIN HARVEST (EVH) AND CARDIOPLEGIA (NO PAC). INTRAOPERATIVE MOOKIE DONE BY DR. GRAJEDA. EVH DONE BY JOSE SHIN.      Subjective/overnight events:   Pt seen with Dr. Maharaj, pt sitting up in the chair. Feeling well this am. Reports he has minimal pain from his chest. He does have pain in his lower back from being moved to the CT table yesterday. Denies nausea. + BM overnight.     On RA. Afebrile. On Amio and LR gtts.     Objective:     /63   Pulse 74   Temp 97.5 °F (36.4 °C) (Oral)   Resp 13   Ht 1.829 m (6')   Wt 87.4 kg (192 lb 10.9 oz)   SpO2 96%   BMI 26.13 kg/m²   Temp (24hrs), Av.7 °F (36.5 °C), Min:97.4 °F (36.3 °C), Max:98.2 °F (36.8 °C)      Last 24hr Input/Output:    Intake/Output Summary (Last 24 hours) at 2024 0855  Last data filed at 2024 0826  Gross per 24 hour   Intake 1720.97 ml   Output 1745 ml   Net -24.03 ml      EKG/Rhythm:   Encounter Date: 24   EKG 12 Lead   Result Value    Ventricular Rate 60    Atrial Rate 60    P-R Interval 164    QRS Duration 94    Q-T Interval 372    QTc Calculation (Bazett) 372    P Axis -2    R Axis -42    T Axis -14    Diagnosis      Normal sinus rhythm  Left axis deviation  Low voltage QRS  Inferior infarct (cited on or before 2024)  Cannot rule out Anterior infarct (cited on or before 2024)  Abnormal ECG  When compared with ECG of 2024 05:35,  Sinus rhythm has replaced Atrial fibrillation  Serial changes of Anterior infarct present       Oxygen: As above    CXR: Xray Result (most recent):  XR CHEST PORTABLE 2024    Narrative  EXAM: XR CHEST PORTABLE    INDICATION: Post op open heart surgery    COMPARISON: 2024    TECHNIQUE: AP portable semierect view of the chest.    FINDINGS:    Small left-sided pleural effusion may be slightly increased.  Cardiac

## 2024-01-23 NOTE — PLAN OF CARE
Problem: Physical Therapy - Adult  Goal: By Discharge: Performs mobility at highest level of function for planned discharge setting.  See evaluation for individualized goals.  Description: FUNCTIONAL STATUS PRIOR TO ADMISSION: Patient was independent and active without use of DME.    HOME SUPPORT PRIOR TO ADMISSION: The patient lived with his girlfriend but did not require assistance. 2-story house but able to stay on 1st floor.    Physical Therapy Goals  Initiated 1/19/2024  1.  Patient will move from supine to sit and sit to supine in bed with contact guard assist within 5 day(s).    2.  Patient will perform sit to stand with contact guard assist within 5 day(s).  3.  Patient will transfer from bed to chair and chair to bed with contact guard assist using the least restrictive device within 5 day(s).  4.  Patient will ambulate with contact guard assist for 300 feet with the least restrictive device within 5 day(s).   5.  Patient will ascend/descend 3 stairs with B handrail(s) with contact guard assist within 5 day(s).  6.  Patient will perform cardiac exercises per protocol with modified independence within 5 days.  7.  Patient will verbally recall and functionally demonstrate mindful-based movements (\"move in the tube\") principles without cues within 5 days.   Outcome: Progressing    PHYSICAL THERAPY TREATMENT    Patient: Nicola Draper (78 y.o. male)  Date: 1/23/2024  Diagnosis: Chest pain [R07.9]  NSTEMI (non-ST elevated myocardial infarction) (HCC) [I21.4] NSTEMI (non-ST elevated myocardial infarction) (HCC)  Procedure(s) (LRB):  ON PUMP CORONARY ARTERY BYPASS (CABG) x4 WITH ENDOSCOPIC VEIN HARVEST (EVH) AND CARDIOPLEGIA (NO PAC). INTRAOPERATIVE MOOKIE DONE BY DR. GRAJEDA. EVH DONE BY JOSE SHIN. (N/A) 7 Days Post-Op  Precautions: Cardiac, Fall Risk (move in the tube)                      ASSESSMENT:  Patient continues to benefit from skilled PT services and is slowly progressing towards goals. Received patient

## 2024-01-23 NOTE — DIABETES MGMT
History  Diabetes drug class Diabetes drug name Additional Comments   Biguanide  Metformin 1000mg BID Was    Sulfonylureas Glipizide 5mg BID  Stopped independently in Nov \"It wasn't doing anything with my sugars\"   DPP4 inhibitors     Thiazolidinediones     SGLT-2 inhibitors Jardiance 25mg daily Cost $400/month!   GLP-1 Margaret Rybelsus    Insulin       Diabetes self-management practices:   Eating pattern   Breakfast: Peanut butter sandwich.  Coffee with artifical sugar   Lunch: Bologna [place] and Coke zero   Dinner: Meatloaf and vegetables, Coke Zero   Snack: Popcorn with oatmeal    Physical activity pattern     Monitoring pattern   Does check glucose   Fastin   2pm: up to 170s   No lows  Taking medications pattern    Reducing risks    Social determinants of health impacting diabetes self-management practices       Overall evaluation:    [x] Achieving individualized A1c target with drug therapy & self-care practices    Subjective   \"I'm eating some of the pudding\"     Objective   Physical exam  General Overweight male in no acute distress.  Neuro  Awake, alert, oriented  Vital Signs   Vitals:    24 1500   BP:    Pulse: 83   Resp: 15   Temp:    SpO2: 96%             Laboratory  Recent Labs     24  0344 24  0309 24  0353   WBC 5.4 5.2 5.0   HGB 10.2* 10.4* 10.1*   HCT 32.0* 31.6* 30.4*   MCV 91.2 90.0 90.5    169 183       Recent Labs     24  0344 24  0309 24  0353    137 142   K 3.6 4.0 3.8    109* 113*   CO2  22 23   BUN 43* 42* 40*   CREATININE 2.51* 2.22* 1.82*       Lab Results   Component Value Date    ALT 12 2024    AST 32 2024    ALKPHOS 41 (L) 2024    BILITOT 1.3 (H) 2024     Lab Results   Component Value Date    TSH 1.480 2024     Lab Results   Component Value Date    LABA1C 7.3 (H) 2024    LABA1C 7.5 (H) 2024    LABA1C 11.4 (H) 10/05/2023     Blood glucose pattern      Significant diabetes-related

## 2024-01-23 NOTE — PROGRESS NOTES
Surgery NP Progress Note    Nicola Draper  [id number]  male  78 y.o.  1945    Admitted for Principal Problem:    NSTEMI (non-ST elevated myocardial infarction) (HCC)  Active Problems:    Type 2 diabetes mellitus with hyperglycemia, without long-term current use of insulin (HCC)    S/P CABG x 4    Stress hyperglycemia    Ileus (HCC)  Resolved Problems:    * No resolved hospital problems. *    Pt seen with Dr. Escobar      Assessment:   Problem resolving    Plan/Recommendations/Medical Decision Making:     - Mobilize with nursing and OOB to chair for meals  - Advance diet once cleared by SLP  - Care per primary team.   - Surgery to sign off.   - F/U outpatient for elective surgery if desired.     Subjective:     Patient having bowel function. Minimal pain. Hernia unchanged from baseline.     Objective:     Blood pressure 115/70, pulse 70, temperature 97.5 °F (36.4 °C), temperature source Oral, resp. rate 14, height 1.829 m (6'), weight 87.4 kg (192 lb 10.9 oz), SpO2 94 %.    Temp (24hrs), Av.7 °F (36.5 °C), Min:97.4 °F (36.3 °C), Max:98.2 °F (36.8 °C)      Pt walking with therapy. NAD   SCDs for mechanical DVT proph while in bed   Abd soft and non-tender.     Body mass index is 26.13 kg/m².     Reference: BMI greater than 30 is classified as obesity and greater than 40 is classified as morbid obesity.       JAMIN Swanson - NP   MSN, APRN, FNP-C, CWOCN-AP, RNFA    24

## 2024-01-23 NOTE — PROGRESS NOTES
Physician Progress Note      PATIENT:               ROBERT CRISTINA  CSN #:                  [id number]  :                       1945  ADMIT DATE:       2024 5:05 AM  DISCH DATE:  RESPONDING  PROVIDER #:        Nguyễn Maharaj MD          QUERY TEXT:    Pt admitted with NSTEMI S/p Cardiac cath on  and CABG x 4 on . Pt   noted to have \"70% ISR of proximal Lcx\" per Cards Cath note dated . If   possible, please document in progress notes and discharge summary the   relationship, if any, between NSTEMI and ISR.    The medical record reflects the following:  Risk Factors: Hx: CAD w/ MI x 2 stents; DM; High Cholesterol;   HTN...Transferred for NSTEMI  Clinical Indicators:     [facility] Procedure note:  Cardiac catheterization: Severe three vessel coronary artery disease with   severe diffuse LAD disease from prox-mid segment with KATIE 2-3 flow, faint   collaterals from RCA  70% ISR of proximal Lcx, Diffuse disease of L-PL branch   of RCA  Normal LVEF, mild mid anterior wall hypokinesis  Normal LVEDP  Mildly Dilated ascending aorta  Successful placement of IABP for chest pain    Treatment: Cardiac cath; IABP inserted during cath; Continue aspirin and   heparin drip; Continue balloon pump and nitroglycerin drip for chest pain;   Continue metoprolol, continue lovastatin; Check 2D echocardiogram; CTS   consult; Emergent CABG;    Thank you,    [email or web address]  Options provided:  -- NSTEMI due to 70% ISR of proximal Lcx  -- NSTEMI unrelated to 70% ISR of proximal Lcx  -- Other - I will add my own diagnosis  -- Disagree - Not applicable / Not valid  -- Disagree - Clinically unable to determine / Unknown  -- Refer to Clinical Documentation Reviewer    PROVIDER RESPONSE TEXT:    This patient has NSTEMI due to 70% ISR of proximal Lcx.    Query created by: Aleksandra Higgins on 2024 2:49 PM      Electronically signed by:  Nguyễn Maharaj MD 2024 12:33 PM

## 2024-01-23 NOTE — PLAN OF CARE
exercises \"outside of the tube\" to prevent scar tissue formation around sternal incision site.    Patient instructed on no asymmetrical reaching over head to ensure BUEs are lifted together above 90* of shoulder flexion.      Lower Body Dressing:  Educated on the benefits of donning clothing tailor sitting and don all clothing while sitting prior to standing. Patient demonstrated lower body dressing with Minimal Assist.         Therapeutic Exercises:   Patient instructed on the benefits and demonstrated cardiac exercises while seated  with Minimal Assist.    CARDIAC   EXERCISE    Sets    Reps    Active  Active Assist    Passive  Self ROM    Comments    Shoulder flexion  1  10  [x]                            []                             []                             []                                Shoulder abduction  1  5 [x]                             []                             []                             []                                Scapular elevation  1  10  [x]                             []                              []                             []                                Scapular retraction  1  10 [x]                             []                             []                             []                                Trunk rotation  1  10  [x]                             []                             []                             []                                Trunk sidebending  1  10 [x]                             []                              []                             []                                                Pain Ratin/10   Pain Intervention(s):   rest and pain is at a level acceptable to the patient      Activity Tolerance:   Fair , requires rest breaks, dizziness however BP stable  Please refer to the flowsheet for vital signs taken during this treatment.    After treatment:   Patient left in no apparent distress sitting up in chair and Call bell

## 2024-01-23 NOTE — PROGRESS NOTES
0700  Bedside and verbal report from Keyon Hillman RN.  Patient sitting in recliner chair, napping.    0740  Dr. Maharaj and heart team rounding. Plan for today: swallow evaluation by SLP, start PO meds, increase activity, pain control.    0800  Assessment completed.      0830  Blanca Desai NP here to see patient.     0900  PT and OT in room, working with patient.  Lidocaine patches placed on right lower back per patient request.     0910  Dr. Keyes here to see patient.     1000  SLP evaluation re: swallowing. Patient swallows pills and water without difficulty. Blanca Desai NP notified of same.     1117  Medicated with flexeril 10 mg po for complaint of back and sacral discomfort. Patient also repositioned in the chair.     1200  Reassessment completed. Patient transferred to CVICU room 2403 via chair. Hips shifted in the chair.     1400  Napping in chair. Remains in atrial fib. Hips shifted in chair.    1530  PO amiodarone given. Will stop IV amio in 2 hours.     1600  Reassessment completed. Patient denies complaints. Hips shifted in chair.    1750  Now in NSR. HR 58-60    1800  Amiodarone drip stopped (per protocol after PO dosing started).  Remains in sinus aisha. Urine output improved (425 ml/2 hours). Hips shifted in the chair.     1900  Bedside and verbal report given to Giana Broderick RN. Patient assisted back to bed with 2 assists.

## 2024-01-23 NOTE — PLAN OF CARE
Speech LAnguage Pathology EVALUATION    Patient: Nicola Draper (78 y.o. male)  Date: 1/23/2024  Primary Diagnosis: Chest pain [R07.9]  NSTEMI (non-ST elevated myocardial infarction) (HCC) [I21.4]  Procedure(s) (LRB):  ON PUMP CORONARY ARTERY BYPASS (CABG) x4 WITH ENDOSCOPIC VEIN HARVEST (EVH) AND CARDIOPLEGIA (NO PAC). INTRAOPERATIVE MOOKIE DONE BY DR. GRAJEDA. EVH DONE BY JOSE SHIN. (N/A) 7 Days Post-Op   Precautions: Cardiac, Fall Risk (move in the tube)                  ASSESSMENT :  Based on the objective data described below, the patient presents with oral/pharyngeal swallow function approximately at baseline. Note patient underwent CABG on 1/16/24 with postoperative course complicated by ileus. Patient cleared for SLP evaluation on this date. Patient demonstrated grossly functional mastication and complete oral clearance. Strong cough observed following solid trial, which was not reproducible on subsequent trials. RN at bedside for medication administration. Patient tolerated pills whole with thin liquid without overt difficulty. Patient tolerated single and sequential sips of thin liquid without overt signs of aspiration.    Patient reporting he does not want anything \"too dry.\" At this time, will initiate Soft&Bite-Sized/Thin Liquid diet, however patient cleared to advance to baseline diet of regular from oral/pharyngeal swallow perspective. Will follow up to ensure least restrictive diet.    Patient will benefit from skilled intervention to address the above impairments.     PLAN :  Recommendations and Planned Interventions:  Diet: Soft and bite sized and thin liquids  -- Can advance to Regular/Thin Liquid diet from SLP perspective  -- Medication as tolerated  -- Routine oral care, as able       Acute SLP Services: Yes, patient will be followed by speech-language pathology 1-2x/week to address goals. Patient's rehabilitation potential is considered to be Excellent.    Discharge Recommendations: Continue to

## 2024-01-23 NOTE — PROGRESS NOTES
Patient slept well overnight, tylenol & Oxycodone 5 mg PO given for back pain at right lower side, clear lung, I/S 1500 ml, sat 92-99% on RA, A Fib 64-90 with good BP, on Amiodarone 0.5 mg/min, had 1 loose BM at the shift change > active BS, good UOP while sleeping, sediment, distal pulses in both feet confirmed with doppler, denies any change in sensation, he said it feel normal, KF better, L/R 50 ml/hr,     0700  Bedside and Verbal shift change report given to PERLA Gama (oncoming nurse) by PERLA Ta (offgoing nurse). Report included the following information SBAR, Kardex, Intake/Output, MAR, Accordion, Recent Results, Med Rec Status and Cardiac Rhythm A Fib.

## 2024-01-23 NOTE — WOUND CARE
Wound care nurse spoke with cardiac NP for Blanca Osorio, and notified her of Left posterior upper thigh Deep Tissue Pressure Injury. WC nurse trying to piece together the cause of the left posterior thigh Deep Tissue pressure injury that is linear shaped and on soft tissue.   With discussion with about the Left saphenous vein harvest it was questioned if a tourniquet was used in procedure?.Patient did have an ACE bandage placed on left leg after procedure and may have been the cause of injury, also. Further investigation is needed and will report as found.    RAKESH WHITE RN, CWON

## 2024-01-23 NOTE — CARE COORDINATION
Transition of Care Plan:    RUR: 17%  Prior Level of Functioning: Independent  Disposition: IPR  If SNF or IPR: Date FOC offered: 1/19/24  Date FOC received: 1/19/24  Accepting facility: Awaiting to hear from [facility]  Date authorization started with reference number: To be started once liaison accepts.  Date authorization received and expires: To be started once liaison from [facility] accepts.  Follow up appointments: To be done by [facility]  DME needed: None  Transportation at discharge: BLS  IM/IMM Medicare/ letter given: To be given prior to discharge.  Is patient a  and connected with [facility]? No   If yes, was [place] transfer form completed and VA notified? N/A  Caregiver Contact: Girlfriend  Discharge Caregiver contacted prior to discharge? Caregiver to be contacted prior to discharge.   Care Conference needed? None  Barriers to discharge: Medical stability      Spoke with liaison at [facility] and she states they are following the patient's progress. Patient for swallow eval today.      Chandni Pina RN BSN CRM        [phone]

## 2024-01-24 ENCOUNTER — APPOINTMENT (OUTPATIENT)
Facility: HOSPITAL | Age: 79
DRG: 233 | End: 2024-01-24
Payer: MEDICARE

## 2024-01-24 LAB
GLUCOSE BLD STRIP.AUTO-MCNC: 137 MG/DL (ref 65–117)
GLUCOSE BLD STRIP.AUTO-MCNC: 141 MG/DL (ref 65–117)
GLUCOSE BLD STRIP.AUTO-MCNC: 170 MG/DL (ref 65–117)
GLUCOSE BLD STRIP.AUTO-MCNC: 178 MG/DL (ref 65–117)
SERVICE CMNT-IMP: ABNORMAL

## 2024-01-24 PROCEDURE — 6370000000 HC RX 637 (ALT 250 FOR IP)

## 2024-01-24 PROCEDURE — 97116 GAIT TRAINING THERAPY: CPT

## 2024-01-24 PROCEDURE — 2100000001 HC CVICU R&B

## 2024-01-24 PROCEDURE — 97110 THERAPEUTIC EXERCISES: CPT

## 2024-01-24 PROCEDURE — 92526 ORAL FUNCTION THERAPY: CPT

## 2024-01-24 PROCEDURE — 71045 X-RAY EXAM CHEST 1 VIEW: CPT

## 2024-01-24 PROCEDURE — 99232 SBSQ HOSP IP/OBS MODERATE 35: CPT | Performed by: CLINICAL NURSE SPECIALIST

## 2024-01-24 PROCEDURE — 97535 SELF CARE MNGMENT TRAINING: CPT

## 2024-01-24 PROCEDURE — 82962 GLUCOSE BLOOD TEST: CPT

## 2024-01-24 PROCEDURE — 6370000000 HC RX 637 (ALT 250 FOR IP): Performed by: CLINICAL NURSE SPECIALIST

## 2024-01-24 PROCEDURE — 2580000003 HC RX 258

## 2024-01-24 PROCEDURE — 6370000000 HC RX 637 (ALT 250 FOR IP): Performed by: NURSE PRACTITIONER

## 2024-01-24 PROCEDURE — 97530 THERAPEUTIC ACTIVITIES: CPT

## 2024-01-24 RX ORDER — LANOLIN ALCOHOL/MO/W.PET/CERES
400 CREAM (GRAM) TOPICAL DAILY
Status: DISCONTINUED | OUTPATIENT
Start: 2024-01-25 | End: 2024-01-27 | Stop reason: HOSPADM

## 2024-01-24 RX ADMIN — DOCUSATE SODIUM AND SENNOSIDES 1 TABLET: 8.6; 5 TABLET, FILM COATED ORAL at 10:18

## 2024-01-24 RX ADMIN — METOPROLOL TARTRATE 12.5 MG: 25 TABLET, FILM COATED ORAL at 22:03

## 2024-01-24 RX ADMIN — AMIODARONE HYDROCHLORIDE 400 MG: 200 TABLET ORAL at 21:55

## 2024-01-24 RX ADMIN — CYCLOBENZAPRINE 10 MG: 10 TABLET, FILM COATED ORAL at 04:23

## 2024-01-24 RX ADMIN — FAMOTIDINE 20 MG: 20 TABLET, FILM COATED ORAL at 10:17

## 2024-01-24 RX ADMIN — CHLORHEXIDINE GLUCONATE, 0.12% ORAL RINSE 15 ML: 1.2 SOLUTION DENTAL at 10:39

## 2024-01-24 RX ADMIN — TAMSULOSIN HYDROCHLORIDE 0.4 MG: 0.4 CAPSULE ORAL at 21:58

## 2024-01-24 RX ADMIN — METOPROLOL TARTRATE 12.5 MG: 25 TABLET, FILM COATED ORAL at 10:18

## 2024-01-24 RX ADMIN — Medication: at 21:55

## 2024-01-24 RX ADMIN — ASPIRIN 81 MG: 81 TABLET, COATED ORAL at 10:18

## 2024-01-24 RX ADMIN — SODIUM CHLORIDE, PRESERVATIVE FREE 10 ML: 5 INJECTION INTRAVENOUS at 11:35

## 2024-01-24 RX ADMIN — AMIODARONE HYDROCHLORIDE 400 MG: 200 TABLET ORAL at 10:21

## 2024-01-24 RX ADMIN — Medication: at 16:38

## 2024-01-24 RX ADMIN — INSULIN GLARGINE 14 UNITS: 100 INJECTION, SOLUTION SUBCUTANEOUS at 10:14

## 2024-01-24 RX ADMIN — DOCUSATE SODIUM AND SENNOSIDES 1 TABLET: 8.6; 5 TABLET, FILM COATED ORAL at 21:58

## 2024-01-24 RX ADMIN — THERA TABS 1 TABLET: TAB at 10:20

## 2024-01-24 RX ADMIN — RIVAROXABAN 15 MG: 15 TABLET, FILM COATED ORAL at 17:26

## 2024-01-24 RX ADMIN — CHLORHEXIDINE GLUCONATE, 0.12% ORAL RINSE 15 ML: 1.2 SOLUTION DENTAL at 21:56

## 2024-01-24 RX ADMIN — POLYETHYLENE GLYCOL 3350 17 G: 17 POWDER, FOR SOLUTION ORAL at 10:00

## 2024-01-24 RX ADMIN — ACETAMINOPHEN 975 MG: 325 TABLET ORAL at 16:38

## 2024-01-24 RX ADMIN — Medication: at 11:34

## 2024-01-24 RX ADMIN — SODIUM CHLORIDE, PRESERVATIVE FREE 10 ML: 5 INJECTION INTRAVENOUS at 21:58

## 2024-01-24 RX ADMIN — SODIUM CHLORIDE, POTASSIUM CHLORIDE, SODIUM LACTATE AND CALCIUM CHLORIDE: 600; 310; 30; 20 INJECTION, SOLUTION INTRAVENOUS at 01:19

## 2024-01-24 ASSESSMENT — PAIN DESCRIPTION - LOCATION: LOCATION: BACK

## 2024-01-24 ASSESSMENT — PAIN SCALES - GENERAL
PAINLEVEL_OUTOF10: 10
PAINLEVEL_OUTOF10: 3
PAINLEVEL_OUTOF10: 0

## 2024-01-24 ASSESSMENT — PAIN DESCRIPTION - DESCRIPTORS: DESCRIPTORS: SPASM

## 2024-01-24 ASSESSMENT — PAIN DESCRIPTION - ORIENTATION: ORIENTATION: RIGHT;LOWER

## 2024-01-24 NOTE — PROGRESS NOTES
1905 Bedside shift change report given to PERLA Faria (oncoming nurse) by PERLA Gama (offgoing nurse). Report included the following information Nurse Handoff Report, Adult Overview, Intake/Output, MAR, Recent Results, and Cardiac Rhythm SR .     2000 Shift assessment completed, see flowsheets for details. Patient Aox4 but very sleepy, currently resting on room air.    2100 Administered 1x chinedu packet with evening meds. Patient enjoyed the fruit punch flavor dissolved in water fairly well.    0000 Reassessment completed, no significant changes.    0400 Reassessment completed, no significant changes.  No labs drawn per orders for every other day labs.    0645 Patient up to chair.    0710  Bedside shift change report given to PERLA Vick (oncoming nurse) by PERLA Faria (offgoing nurse). Report included the following information Nurse Handoff Report, Adult Overview, Intake/Output, MAR, Recent Results, and Cardiac Rhythm SR .

## 2024-01-24 NOTE — PLAN OF CARE
Problem: Physical Therapy - Adult  Goal: By Discharge: Performs mobility at highest level of function for planned discharge setting.  See evaluation for individualized goals.  Description: FUNCTIONAL STATUS PRIOR TO ADMISSION: Patient was independent and active without use of DME.    HOME SUPPORT PRIOR TO ADMISSION: The patient lived with his girlfriend but did not require assistance. 2-story house but able to stay on 1st floor.    Physical Therapy Goals  Weekly re-assessment 1/24/24, goal still remain appropriate  1.  Patient will move from supine to sit and sit to supine in bed with contact guard assist within 5 day(s).    2.  Patient will perform sit to stand with contact guard assist within 5 day(s).  3.  Patient will transfer from bed to chair and chair to bed with contact guard assist using the least restrictive device within 5 day(s).  4.  Patient will ambulate with contact guard assist for 300 feet with the least restrictive device within 5 day(s).   5.  Patient will ascend/descend 3 stairs with B handrail(s) with contact guard assist within 5 day(s).  6.  Patient will perform cardiac exercises per protocol with modified independence within 5 days.  7.  Patient will verbally recall and functionally demonstrate mindful-based movements (\"move in the tube\") principles without cues within 5 days.    Initiated 1/19/2024  1.  Patient will move from supine to sit and sit to supine in bed with contact guard assist within 5 day(s).    2.  Patient will perform sit to stand with contact guard assist within 5 day(s).  3.  Patient will transfer from bed to chair and chair to bed with contact guard assist using the least restrictive device within 5 day(s).  4.  Patient will ambulate with contact guard assist for 300 feet with the least restrictive device within 5 day(s).   5.  Patient will ascend/descend 3 stairs with B handrail(s) with contact guard assist within 5 day(s).  6.  Patient will perform cardiac exercises

## 2024-01-24 NOTE — DIABETES MGMT
7.3 (H) 2024    LABA1C 7.5 (H) 2024    LABA1C 11.4 (H) 10/05/2023     Blood glucose pattern      Significant diabetes-related events over the past 24-72 hours  A1C 7.3%    Fasting B  Pre-prandial: 161-186  Basal: Lantus held  Bolus: held   Correction: 0 units   Total daily insulin dose in the last 24 hours: 14 units   Regular diet      Assessment and Nursing Intervention   Nursing Diagnosis Risk for unstable blood glucose pattern   Nursing Intervention Domain 5250 Decision-making Support   Nursing Interventions Examined current inpatient diabetes/blood glucose control   Explored factors facilitating and impeding inpatient management  Explored corrective strategies with patient and responsible inpatient provider   Informed patient of rational for insulin strategy while hospitalized     Billing Code(s)   08243    Before making these care recommendations, I personally reviewed the hospitalization record, including notes, laboratory & diagnostic data and current medications, and examined the patient at the bedside (circumstances permitting) before determining care. More than fifty (50) percent of the time was spent in patient counseling and/or care coordination.  Total minutes: 40    JAMIN Washburn - CNS  Diabetes Clinical Nurse Specialist  [facility]  Access via [employer]

## 2024-01-24 NOTE — PROGRESS NOTES
0800  AM assessment complete. Pt has no c/o pain. Encouraged PO intake.    1020 Pt walked in brannon with PT and tolerated well    1200 Reassessment complete. Family at bedside    1720 CHG bath done. Incision cleansed with soap and water, CT site dressing changed and pacer wire care complete.  Pt walked on brannon 50ft.

## 2024-01-24 NOTE — CARE COORDINATION
Transition of Care Plan:    RUR: 17%  Prior Level of Functioning: Independent  Disposition: IPR_ [facility]    If SNF or IPR: Date FOC offered: 1/19/24  Date FOC received: 1/19/24  Accepting facility: [facility]  Date authorization started with reference number:  1/24/24  Date authorization received and expires:   Follow up appointments: PCP Specialist  DME needed: rollling walker and wheelchair if not going to IPR  Transportation at discharge: BLS to arrange for transportation   IM/IMM Medicare/ letter given: 2nd Im Letter to be given  Is patient a [place] and connected with [facility]? N/A   If yes, was [place] transfer form completed and VA notified?   Caregiver Contact: Amy Juarez                                           [phone] (Home Phone)   Discharge Caregiver contacted prior to discharge? If pt wishes  Care Conference needed? none  Barriers to discharge: IPR- Auth pending    CM spoke with Brie that shared pt is ready ford/c to start auth with [facility].      CM contacted [facility] [place] liaison with [facility] to start auth via careport.     ADAN Vick,RN  Care   [facility]  Phone: [phone]

## 2024-01-24 NOTE — PROGRESS NOTES
[facility] FLOOR Progress Note    Admit Date: 2024  POD: 8 Days Post-Op      Procedure:  Procedure(s):  ON PUMP CORONARY ARTERY BYPASS (CABG) x4 WITH ENDOSCOPIC VEIN HARVEST (EVH) AND CARDIOPLEGIA (NO PAC). INTRAOPERATIVE MOOKIE DONE BY DR. GRAJEDA. EVH DONE BY JOSE SHIN.      Subjective/overnight events:   Pt seen with Dr. Maharaj, pt sitting up in the chair. Feeling well this am. Breakfast just arrived. Pt reports +flatus.     Objective:     BP (!) 140/66   Pulse 75   Temp 97.7 °F (36.5 °C) (Oral)   Resp 16   Ht 1.829 m (6')   Wt 88.5 kg (195 lb 3.2 oz)   SpO2 96%   BMI 26.47 kg/m²   Temp (24hrs), Av.8 °F (36.6 °C), Min:97.4 °F (36.3 °C), Max:98.7 °F (37.1 °C)      Last 24hr Input/Output:    Intake/Output Summary (Last 24 hours) at 2024 0934  Last data filed at 2024 0600  Gross per 24 hour   Intake 2044.21 ml   Output 1290 ml   Net 754.21 ml      EKG/Rhythm:   Encounter Date: 24   EKG 12 Lead   Result Value    Ventricular Rate 60    Atrial Rate 60    P-R Interval 164    QRS Duration 94    Q-T Interval 372    QTc Calculation (Bazett) 372    P Axis -2    R Axis -42    T Axis -14    Diagnosis      Normal sinus rhythm  Left axis deviation  Low voltage QRS  Inferior infarct (cited on or before 2024)  Cannot rule out Anterior infarct (cited on or before 2024)  Abnormal ECG  When compared with ECG of 2024 05:35,  Sinus rhythm has replaced Atrial fibrillation  Serial changes of Anterior infarct present       Oxygen: As above    CXR: Xray Result (most recent):  XR CHEST PORTABLE 2024    Narrative  EXAM: XR CHEST PORTABLE    INDICATION: Post op open heart surgery    COMPARISON: 2024    TECHNIQUE: AP portable semierect view of the chest.    FINDINGS:    Small left-sided pleural effusion may be slightly increased.  Cardiac silhouette is stable.  No acute bony abnormalities.    Impression  Increase in small left-sided pleural effusion.    Admission Weight: Last Weight

## 2024-01-24 NOTE — DISCHARGE INSTRUCTIONS
[facility]     [address] [facility]                        [address] [place] [address] I  [address]                                                           [address]    [place] [address]                                       [place] [address]  Office- [phone]  Fax- [phone]        Office- [phone]  Fax- [phone]  _________________________________________________________________  Dr. Dung Soler, SACHA Zambrano, ALEIDA Zuluaga, SACHA Clark, SACHA Corona, ALEIDA Nash Dr., NP                                                                                                                                                            Name:Nicola Draper     Surgery & Date: 1/16/24    Discharge Date: [unfilled]     MEDICATIONS:  Please refer to your After Visit Summary for your medication list. If you do not have a prescription for a new medication, you may purchase the medication over the counter.   DO NOT TAKE ANY MEDICATIONS THAT ARE NOT ON THIS LIST      INSTRUCTIONS:  NO SMOKING OR TOBACCO PRODUCTS  Follow all the instructions in your discharge book  Shower daily.  Wash all incisions twice daily with Dial soap and water.  After each wash with soap and water, wash incisions with Dynahex 4 solution and rinse. Do this for 14 days. No lotions, ointments or powder.  Call the office immediately for any redness, swelling, or drainage from your incision.   Take your temperature daily and call for a temperature of 101 degrees or higher or for any symptoms that make you think you have and infection.  Weigh yourself each morning.  Call if you gain more than 5 pounds in 48 hours.  Use the incentive spirometer 6-8 times a day-10 breaths each time.  Use a pillow or your bear to

## 2024-01-24 NOTE — PROGRESS NOTES
Cardiac Surgery Care Coordinator- Met with Nicola Draper and his family.  Reviewed plan of care.  Reinforced sternal precautions and encouraged continued use of the incentive spirometer. Began discharge teaching and encouraged them to verbalize.  Reviewed goals for the day and discussed the  importance of increased activity and continued activity after discharge. Will continue to follow for educational and emotional needs.  Linda Patel RN

## 2024-01-24 NOTE — PLAN OF CARE
Problem: Occupational Therapy - Adult  Goal: By Discharge: Performs self-care activities at highest level of function for planned discharge setting.  See evaluation for individualized goals.  Description: FUNCTIONAL STATUS PRIOR TO ADMISSION:   Receives Help From: Other (comment) (Girlfriend - Amy Delaney), ADL Assistance: Independent, Homemaking Assistance: Independent, Ambulation Assistance: Independent, Transfer Assistance: Independent, Active : Yes     HOME SUPPORT: Patient lived with s/o but didn't require assistance.    Occupational Therapy Goals:  Initiated 1/19/2024  1.  Patient will perform upper body dressing with Minimal Assist within 7 day(s).  2.  Patient will perform lower body dressing with Moderate Assist within 7 day(s).  3.  Patient will perform toileting with Moderate Assist within 7 day(s).  4.  Patient will perform toilet transfers with Minimal Assist  within 7 day(s).  5.  Patient will perform bed mobility with Minimal Assist within 7 day(s).  6.  Patient will participate in exercises with Stand by Assist within 7 day(s).    7.  Patient will adhere to move in the tube precautions during functional activities with verbal cues within 7 day(s).    Outcome: Progressing   OCCUPATIONAL THERAPY TREATMENT  Patient: Nicola Draper (78 y.o. male)  Date: 1/24/2024  Primary Diagnosis: Chest pain [R07.9]  NSTEMI (non-ST elevated myocardial infarction) (HCC) [I21.4]  Procedure(s) (LRB):  ON PUMP CORONARY ARTERY BYPASS (CABG) x4 WITH ENDOSCOPIC VEIN HARVEST (EVH) AND CARDIOPLEGIA (NO PAC). INTRAOPERATIVE MOOKIE DONE BY DR. GRAJEDA. EVH DONE BY JOSE SHIN. (N/A) 8 Days Post-Op   Precautions: Cardiac, Fall Risk (move in the tube)                Chart, occupational therapy assessment, plan of care, and goals were reviewed.    ASSESSMENT  Patient continues to benefit from skilled OT services and is progressing towards goals, but remains limited by cognition (memory, processing), activity tolerance

## 2024-01-24 NOTE — PLAN OF CARE
by Rodriguez Anderson MD at [facility] CARDIAC CATH LAB    CORONARY ANGIOPLASTY WITH STENT PLACEMENT  2011    CORONARY ARTERY BYPASS GRAFT N/A 1/16/2024    ON PUMP CORONARY ARTERY BYPASS (CABG) x4 WITH ENDOSCOPIC VEIN HARVEST (EVH) AND CARDIOPLEGIA (NO PAC). INTRAOPERATIVE MOOKIE DONE BY DR. GRAJEDA. EVH DONE BY JOSE SHIN. performed by Nguyễn Maharaj MD at [facility] OPEN HEART    HERNIA REPAIR  2012    PTCA  09/2021    ANTOINE L Circumflex artery Dr. Kelly     Prior Level of Function/Home Situation:   Social/Functional History  Lives With: Significant other  Type of Home: House  Home Layout: Two level, Able to Live on Main level with bedroom/bathroom  Home Access: Stairs to enter with rails  Entrance Stairs - Number of Steps: 4  Entrance Stairs - Rails: Both  Bathroom Shower/Tub: Walk-in shower  Bathroom Toilet: Handicap height  Bathroom Equipment: Grab bars in shower  Home Equipment: None  Has the patient had two or more falls in the past year or any fall with injury in the past year?: No  Receives Help From: Other (comment) (Girlfriend - Amy Delaney)  ADL Assistance: Independent  Homemaking Assistance: Independent  Ambulation Assistance: Independent  Transfer Assistance: Independent  Active : Yes  Mode of Transportation: Car    Baseline Assessment:                Cognitive and Communication Status:  Neurologic State: Alert  Orientation Level: Oriented x4  Cognition: Follows commands    Dysphagia:  P.O. Trials:  PO Trials  Assessment Method(s): Observation  Patient Position: Upright in chair  Vocal Quality: No Impairment  Consistency Presented: Thin;Soft & Bite Sized  How Presented: Self-fed/presented;Cup/sip;Spoon  Bolus Acceptance: No impairment  Bolus Formation/Control: No impairment  Propulsion: No impairment  Oral Residue: None  Aspiration Signs/Symptoms: None  Pharyngeal Phase Characteristics: No impairment, issues, or problems            Respiratory Status/Airway:  Room air                           Functional Oral

## 2024-01-25 ENCOUNTER — APPOINTMENT (OUTPATIENT)
Facility: HOSPITAL | Age: 79
DRG: 233 | End: 2024-01-25
Payer: MEDICARE

## 2024-01-25 LAB
ANION GAP SERPL CALC-SCNC: 4 MMOL/L (ref 5–15)
BUN SERPL-MCNC: 37 MG/DL (ref 6–20)
BUN/CREAT SERPL: 22 (ref 12–20)
CALCIUM SERPL-MCNC: 9.4 MG/DL (ref 8.5–10.1)
CHLORIDE SERPL-SCNC: 110 MMOL/L (ref 97–108)
CK SERPL-CCNC: 118 U/L (ref 39–308)
CO2 SERPL-SCNC: 24 MMOL/L (ref 21–32)
CREAT SERPL-MCNC: 1.71 MG/DL (ref 0.7–1.3)
ERYTHROCYTE [DISTWIDTH] IN BLOOD BY AUTOMATED COUNT: 14 % (ref 11.5–14.5)
GLUCOSE BLD STRIP.AUTO-MCNC: 140 MG/DL (ref 65–117)
GLUCOSE BLD STRIP.AUTO-MCNC: 153 MG/DL (ref 65–117)
GLUCOSE BLD STRIP.AUTO-MCNC: 165 MG/DL (ref 65–117)
GLUCOSE BLD STRIP.AUTO-MCNC: 173 MG/DL (ref 65–117)
GLUCOSE SERPL-MCNC: 138 MG/DL (ref 65–100)
HCT VFR BLD AUTO: 30.9 % (ref 36.6–50.3)
HGB BLD-MCNC: 9.8 G/DL (ref 12.1–17)
MCH RBC QN AUTO: 29.3 PG (ref 26–34)
MCHC RBC AUTO-ENTMCNC: 31.7 G/DL (ref 30–36.5)
MCV RBC AUTO: 92.2 FL (ref 80–99)
NRBC # BLD: 0 K/UL (ref 0–0.01)
NRBC BLD-RTO: 0 PER 100 WBC
PLATELET # BLD AUTO: 190 K/UL (ref 150–400)
PMV BLD AUTO: 9 FL (ref 8.9–12.9)
POTASSIUM SERPL-SCNC: 3.6 MMOL/L (ref 3.5–5.1)
RBC # BLD AUTO: 3.35 M/UL (ref 4.1–5.7)
SERVICE CMNT-IMP: ABNORMAL
SODIUM SERPL-SCNC: 138 MMOL/L (ref 136–145)
WBC # BLD AUTO: 4.4 K/UL (ref 4.1–11.1)

## 2024-01-25 PROCEDURE — 6360000002 HC RX W HCPCS

## 2024-01-25 PROCEDURE — 85027 COMPLETE CBC AUTOMATED: CPT

## 2024-01-25 PROCEDURE — 6370000000 HC RX 637 (ALT 250 FOR IP): Performed by: INTERNAL MEDICINE

## 2024-01-25 PROCEDURE — 6370000000 HC RX 637 (ALT 250 FOR IP)

## 2024-01-25 PROCEDURE — 71045 X-RAY EXAM CHEST 1 VIEW: CPT

## 2024-01-25 PROCEDURE — 2580000003 HC RX 258

## 2024-01-25 PROCEDURE — 6370000000 HC RX 637 (ALT 250 FOR IP): Performed by: CLINICAL NURSE SPECIALIST

## 2024-01-25 PROCEDURE — 97530 THERAPEUTIC ACTIVITIES: CPT | Performed by: PHYSICAL THERAPIST

## 2024-01-25 PROCEDURE — 80048 BASIC METABOLIC PNL TOTAL CA: CPT

## 2024-01-25 PROCEDURE — 6370000000 HC RX 637 (ALT 250 FOR IP): Performed by: THORACIC SURGERY (CARDIOTHORACIC VASCULAR SURGERY)

## 2024-01-25 PROCEDURE — 82962 GLUCOSE BLOOD TEST: CPT

## 2024-01-25 PROCEDURE — 2100000001 HC CVICU R&B

## 2024-01-25 PROCEDURE — 6370000000 HC RX 637 (ALT 250 FOR IP): Performed by: NURSE PRACTITIONER

## 2024-01-25 PROCEDURE — 97530 THERAPEUTIC ACTIVITIES: CPT

## 2024-01-25 PROCEDURE — 82550 ASSAY OF CK (CPK): CPT

## 2024-01-25 PROCEDURE — 74176 CT ABD & PELVIS W/O CONTRAST: CPT

## 2024-01-25 PROCEDURE — 99231 SBSQ HOSP IP/OBS SF/LOW 25: CPT

## 2024-01-25 PROCEDURE — 97535 SELF CARE MNGMENT TRAINING: CPT

## 2024-01-25 PROCEDURE — 36415 COLL VENOUS BLD VENIPUNCTURE: CPT

## 2024-01-25 RX ORDER — FUROSEMIDE 20 MG/1
20 TABLET ORAL DAILY
Status: DISCONTINUED | OUTPATIENT
Start: 2024-01-25 | End: 2024-01-26

## 2024-01-25 RX ORDER — POTASSIUM CHLORIDE 20 MEQ/1
20 TABLET, EXTENDED RELEASE ORAL DAILY
Status: DISCONTINUED | OUTPATIENT
Start: 2024-01-25 | End: 2024-01-26

## 2024-01-25 RX ADMIN — ONDANSETRON 4 MG: 2 INJECTION INTRAMUSCULAR; INTRAVENOUS at 09:42

## 2024-01-25 RX ADMIN — Medication: at 20:40

## 2024-01-25 RX ADMIN — RIVAROXABAN 15 MG: 15 TABLET, FILM COATED ORAL at 17:52

## 2024-01-25 RX ADMIN — POTASSIUM CHLORIDE 20 MEQ: 1500 TABLET, EXTENDED RELEASE ORAL at 09:31

## 2024-01-25 RX ADMIN — SODIUM CHLORIDE, PRESERVATIVE FREE 10 ML: 5 INJECTION INTRAVENOUS at 20:40

## 2024-01-25 RX ADMIN — POLYETHYLENE GLYCOL 3350 17 G: 17 POWDER, FOR SOLUTION ORAL at 09:29

## 2024-01-25 RX ADMIN — THERA TABS 1 TABLET: TAB at 09:31

## 2024-01-25 RX ADMIN — ACETAMINOPHEN 975 MG: 325 TABLET ORAL at 17:51

## 2024-01-25 RX ADMIN — FUROSEMIDE 20 MG: 20 TABLET ORAL at 09:31

## 2024-01-25 RX ADMIN — ACETAMINOPHEN 975 MG: 325 TABLET ORAL at 00:03

## 2024-01-25 RX ADMIN — Medication 400 MG: at 09:31

## 2024-01-25 RX ADMIN — INSULIN GLARGINE 14 UNITS: 100 INJECTION, SOLUTION SUBCUTANEOUS at 09:15

## 2024-01-25 RX ADMIN — ASPIRIN 81 MG: 81 TABLET, COATED ORAL at 09:31

## 2024-01-25 RX ADMIN — CHLORHEXIDINE GLUCONATE, 0.12% ORAL RINSE 15 ML: 1.2 SOLUTION DENTAL at 09:31

## 2024-01-25 RX ADMIN — METOPROLOL TARTRATE 12.5 MG: 25 TABLET, FILM COATED ORAL at 20:40

## 2024-01-25 RX ADMIN — ACETAMINOPHEN 975 MG: 325 TABLET ORAL at 22:15

## 2024-01-25 RX ADMIN — FAMOTIDINE 20 MG: 20 TABLET, FILM COATED ORAL at 09:31

## 2024-01-25 RX ADMIN — AMIODARONE HYDROCHLORIDE 400 MG: 200 TABLET ORAL at 20:41

## 2024-01-25 RX ADMIN — LOVASTATIN 80 MG: 40 TABLET ORAL at 20:42

## 2024-01-25 RX ADMIN — SODIUM CHLORIDE, PRESERVATIVE FREE 10 ML: 5 INJECTION INTRAVENOUS at 09:34

## 2024-01-25 RX ADMIN — CYCLOBENZAPRINE 10 MG: 10 TABLET, FILM COATED ORAL at 22:14

## 2024-01-25 RX ADMIN — DOCUSATE SODIUM AND SENNOSIDES 1 TABLET: 8.6; 5 TABLET, FILM COATED ORAL at 09:31

## 2024-01-25 RX ADMIN — OXYCODONE 10 MG: 5 TABLET ORAL at 14:12

## 2024-01-25 RX ADMIN — ACETAMINOPHEN 975 MG: 325 TABLET ORAL at 06:49

## 2024-01-25 RX ADMIN — TAMSULOSIN HYDROCHLORIDE 0.4 MG: 0.4 CAPSULE ORAL at 20:42

## 2024-01-25 RX ADMIN — AMIODARONE HYDROCHLORIDE 400 MG: 200 TABLET ORAL at 09:30

## 2024-01-25 RX ADMIN — Medication: at 17:53

## 2024-01-25 RX ADMIN — METOPROLOL TARTRATE 12.5 MG: 25 TABLET, FILM COATED ORAL at 09:32

## 2024-01-25 RX ADMIN — Medication: at 09:33

## 2024-01-25 ASSESSMENT — PAIN DESCRIPTION - LOCATION
LOCATION: INCISION
LOCATION: BACK
LOCATION: BACK

## 2024-01-25 ASSESSMENT — PAIN DESCRIPTION - DESCRIPTORS: DESCRIPTORS: ACHING

## 2024-01-25 ASSESSMENT — PAIN SCALES - GENERAL
PAINLEVEL_OUTOF10: 4
PAINLEVEL_OUTOF10: 9
PAINLEVEL_OUTOF10: 4

## 2024-01-25 ASSESSMENT — PAIN DESCRIPTION - ORIENTATION: ORIENTATION: RIGHT;LOWER

## 2024-01-25 ASSESSMENT — PAIN - FUNCTIONAL ASSESSMENT: PAIN_FUNCTIONAL_ASSESSMENT: PREVENTS OR INTERFERES WITH MANY ACTIVE NOT PASSIVE ACTIVITIES

## 2024-01-25 NOTE — CARE COORDINATION
ransition of Care Plan:     RUR: 17%  Prior Level of Functioning: Independent  Disposition: IPR_ [facility]     If SNF or IPR: Date FOC offered: 1/19/24  Date FOC received: 1/19/24  Accepting facility: [facility]  Date authorization started with reference number:  1/24/24  Date authorization received and expires:   Follow up appointments: PCP Specialist  DME needed: rollling walker and wheelchair if not going to IPR  Transportation at discharge: BLS to arrange for transportation   IM/IMM Medicare/ letter given: 2nd Im Letter to be given  Is patient a [place] and connected with VA? N/A              If yes, was  transfer form completed and VA notified?   Caregiver Contact: Girlfriend, Amy Delaney                                           [phone] (Home Phone)   Discharge Caregiver contacted prior to discharge? If pt wishes  Care Conference needed? none  Barriers to discharge: IPR- Auth pending    CM followed on auth status- still pending         ADAN Vick,RN  Care   [facility]  Phone: [phone]

## 2024-01-25 NOTE — PROGRESS NOTES
NAME: Nicola Draper        :  1945        MRN:  [id number]          Assessment :    Plan:  RODOLFO-    CKD 3B: Baseline CR appears to be around 1.5-1.7,  Likely due to early DN + hypertension.  Patient sees a local nephrologist in [place].       Horseshoe kidney  Hypertension  DM-2 with early DN  Mild BPH  Hypermagnesemia  CAD/NSTEMI  Status post CABG  A-fib Creatinine peaked at 2.5, now back to baseline of 1.7; non-oliguric    rodriguez for urine retention-removal plan per Urology    No renal mass on CT     Mg high on  - decreased po Mg to daily           Subjective:     Chief Complaint:  oob in chair. Alert. With some n/v at the time of my visit - he states he took too many pills at once.    Review of Systems:    Symptom Y/N Comments  Symptom Y/N Comments   Fever/Chills    Chest Pain     Poor Appetite    Edema     Cough    Abdominal Pain     Sputum    Joint Pain     SOB/SHELTON    Pruritis/Rash     Nausea/vomit    Tolerating PT/OT     Diarrhea    Tolerating Diet     Constipation    Other       Could not obtain due to:      Objective:     VITALS:   Last 24hrs VS reviewed since prior progress note. Most recent are:  Vitals:    24 0418   BP: 126/63   Pulse: 65   Resp: 19   Temp: 98.1 °F (36.7 °C)   SpO2: 97%       Intake/Output Summary (Last 24 hours) at 2024 0617  Last data filed at 2024 0418  Gross per 24 hour   Intake 530 ml   Output 2220 ml   Net -1690 ml        Telemetry Reviewed:     PHYSICAL EXAM:  General: NAD      Lab Data Reviewed: (see below)    Medications Reviewed: (see below)    PMH/SH reviewed - no change compared to H&P  ________________________________________________________________________  Care Plan discussed with:  Patient     Family      RN     Care Manager                    Consultant:          Comments   >50% of visit spent in counseling and coordination of care

## 2024-01-25 NOTE — PROGRESS NOTES
1900: Bedside shift change report given to PERLA Boyle (oncoming nurse) by PERLA Vick (offgoing nurse). Report included the following information Nurse Handoff Report.      2000: Assessment complete. Pt A&O x 4. Pt moves all extremities purposefully and to command. Pt in sinus rhythm on monitor. Pt has palpable radial pulses bilaterally. Pt's RLE pedal pulse is able to be found via doppler. Pt's LLE pulse is palpable. Pt has soft abdomen with active bowel sounds. Pt on RA with diminished lung sounds bilaterally.    0000: Bedside shift change report given to PERLA Orona (oncoming nurse) by PERLA Boyle (offgoing nurse). Report included the following information Nurse Handoff Report.

## 2024-01-25 NOTE — PROGRESS NOTES
0000: Assumed care of pt at this time, report received from PERLA Boyle.    0030: assessment complete see flow sheet for details    0418: reassessment complete, no acute changes    0600: labs collected and sent. MSI care complete, pt weighed and up to chair    0700: Bedside and Verbal shift change report given to PERLA Triplett (oncoming nurse) by PERLA Orona (offgoing nurse). Report included the following information Nurse Handoff Report, Index, Adult Overview, Surgery Report, Intake/Output, MAR, Recent Results, Med Rec Status, Cardiac Rhythm NSR, and Neuro Assessment.

## 2024-01-25 NOTE — PLAN OF CARE
Status:  Orientation  Overall Orientation Status: Within Functional Limits  Orientation Level: Oriented X4  Cognition  Overall Cognitive Status: WFL  Cognition Comment: improved alertness this date    Functional Mobility and Transfers for ADLs:  Bed Mobility:  Bed Mobility Training  Bed Mobility Training: Yes  Interventions: Verbal cues;Tactile cues;Safety awareness training;Visual cues (log roll, move in tube)  Rolling: Minimum assistance;Assist X1  Sit to Supine: Assist X1;Minimum assistance     Transfers:   Transfer Training  Interventions: Verbal cues;Safety awareness training  Sit to Stand: Moderate assistance;Minimum assistance;Assist X1  Stand to Sit: Minimum assistance;Assist X1  Bed to Chair: Contact-guard assistance;Assist X1;Additional time           Balance:  Standing: Impaired  Balance  Sitting: Intact  Standing: Impaired  Standing - Static: Constant support;Fair  Standing - Dynamic: Constant support;Fair      ADL Intervention:         Patient instructed and educated on mindful movement principles based on “Move in The Tube” maintaining bilateral elbows close to rib cage when performing any load-bearing activity.  Educated on applying this concept when getting in/out of bed, pushing up from a chair, opening a door, or lifting a box.  Patient has been provided handouts with diagrams of each correct/incorrect method of performing each of the above tasks.     Patient instructed on the ability to utilize upper extremities “outside the tube” when doing any non-load bearing activity such as washing hair/body, brushing teeth, retrieving clothing items, or scratching your back. Patient encouraged to also perform upper extremity exercises \"outside of the tube\" to prevent scar tissue formation around sternal incision site.      Bathing/sternal incision hygiene:    Education provided on:    -only touching incisions with clean hands  -not letting water directly hit incisions  -on 2-step sternal hygiene:  -using

## 2024-01-25 NOTE — PROGRESS NOTES
[facility] FLOOR Progress Note    Admit Date: 2024  POD: 9 Days Post-Op      Procedure:  Procedure(s):  ON PUMP CORONARY ARTERY BYPASS (CABG) x4 WITH ENDOSCOPIC VEIN HARVEST (EVH) AND CARDIOPLEGIA (NO PAC). INTRAOPERATIVE MOOKIE DONE BY DR. GRAJEDA. EVH DONE BY JOSE SHIN.      Subjective/overnight events:   Pt seen with Dr. Maharaj, up in the chair. In NSR, on room air, afebrile. Vital signs stable. No events overnight. No complaints at present.    Objective:     BP (!) 143/60   Pulse 66   Temp 98.1 °F (36.7 °C) (Oral)   Resp 17   Ht 1.829 m (6')   Wt 88.1 kg (194 lb 4.8 oz)   SpO2 94%   BMI 26.35 kg/m²   Temp (24hrs), Av.2 °F (36.8 °C), Min:97.6 °F (36.4 °C), Max:99 °F (37.2 °C)      Last 24hr Input/Output:    Intake/Output Summary (Last 24 hours) at 2024 0742  Last data filed at 2024 0651  Gross per 24 hour   Intake 530 ml   Output 2445 ml   Net -1915 ml        EKG/Rhythm:   Encounter Date: 24   EKG 12 Lead   Result Value    Ventricular Rate 60    Atrial Rate 60    P-R Interval 164    QRS Duration 94    Q-T Interval 372    QTc Calculation (Bazett) 372    P Axis -2    R Axis -42    T Axis -14    Diagnosis      Normal sinus rhythm  Left axis deviation  Low voltage QRS  Inferior infarct (cited on or before 2024)  Cannot rule out Anterior infarct (cited on or before 2024)  Abnormal ECG  When compared with ECG of 2024 05:35,  Sinus rhythm has replaced Atrial fibrillation  Serial changes of Anterior infarct present       Oxygen: As above    CXR: Xray Result (most recent):  XR CHEST PORTABLE 2024    Narrative  EXAM:  XR CHEST PORTABLE    INDICATION: Postop    COMPARISON: 2024    TECHNIQUE: Semiupright portable chest AP view    FINDINGS: Cardiac monitoring leads. The cardiac silhouette is within normal  limits. The pulmonary vasculature is within normal limits.    Small left effusion. The visualized bones and upper abdomen are age-appropriate.    Impression  Small left

## 2024-01-25 NOTE — PROGRESS NOTES
Admit Date: 2024    POD 9 Days Post-Op    Procedure:  Procedure(s):  ON PUMP CORONARY ARTERY BYPASS (CABG) x4 WITH ENDOSCOPIC VEIN HARVEST (EVH) AND CARDIOPLEGIA (NO PAC). INTRAOPERATIVE MOOKIE DONE BY DR. GRAJEDA. EVH DONE BY JOSE SHIN.    Subjective:     Patient reported he felt great. He took all of his AM meds and then had an episode of vomiting. Passing flatus.     Objective:     Blood pressure (!) 115/50, pulse 74, temperature 97.6 °F (36.4 °C), temperature source Oral, resp. rate (P) 15, height 1.829 m (6'), weight 88.1 kg (194 lb 4.8 oz), SpO2 91 %.    Temp (24hrs), Av.1 °F (36.7 °C), Min:97.6 °F (36.4 °C), Max:99 °F (37.2 °C)      Physical Exam:  GENERAL: alert, appears stated age, and cooperative, LUNG: clear to auscultation bilaterally, HEART: regularly irregular rhythm, ABDOMEN: soft, non-tender; bowel sounds normal; no masses,  no organomegaly, EXTREMITIES:  edema bilaterally    Labs:   Recent Results (from the past 24 hour(s))   POCT Glucose    Collection Time: 24  5:30 PM   Result Value Ref Range    POC Glucose 137 (H) 65 - 117 mg/dL    Performed by: Cristofer Vick    POCT Glucose    Collection Time: 24  9:54 PM   Result Value Ref Range    POC Glucose 170 (H) 65 - 117 mg/dL    Performed by: Deysi Boyle RN    CK    Collection Time: 24  5:58 AM   Result Value Ref Range    Total  39 - 308 U/L   CBC    Collection Time: 24  5:58 AM   Result Value Ref Range    WBC 4.4 4.1 - 11.1 K/uL    RBC 3.35 (L) 4.10 - 5.70 M/uL    Hemoglobin 9.8 (L) 12.1 - 17.0 g/dL    Hematocrit 30.9 (L) 36.6 - 50.3 %    MCV 92.2 80.0 - 99.0 FL    MCH 29.3 26.0 - 34.0 PG    MCHC 31.7 30.0 - 36.5 g/dL    RDW 14.0 11.5 - 14.5 %    Platelets 190 150 - 400 K/uL    MPV 9.0 8.9 - 12.9 FL    Nucleated RBCs 0.0 0  WBC    nRBC 0.00 0.00 - 0.01 K/uL   Basic Metabolic Panel    Collection Time: 24  5:58 AM   Result Value Ref Range    Sodium 138 136 - 145 mmol/L    Potassium 3.6 3.5 - 5.1 mmol/L

## 2024-01-26 ENCOUNTER — APPOINTMENT (OUTPATIENT)
Facility: HOSPITAL | Age: 79
DRG: 233 | End: 2024-01-26
Payer: MEDICARE

## 2024-01-26 LAB
GLUCOSE BLD STRIP.AUTO-MCNC: 141 MG/DL (ref 65–117)
GLUCOSE BLD STRIP.AUTO-MCNC: 148 MG/DL (ref 65–117)
GLUCOSE BLD STRIP.AUTO-MCNC: 153 MG/DL (ref 65–117)
GLUCOSE BLD STRIP.AUTO-MCNC: 170 MG/DL (ref 65–117)
SERVICE CMNT-IMP: ABNORMAL

## 2024-01-26 PROCEDURE — 97535 SELF CARE MNGMENT TRAINING: CPT

## 2024-01-26 PROCEDURE — 6370000000 HC RX 637 (ALT 250 FOR IP)

## 2024-01-26 PROCEDURE — 2580000003 HC RX 258

## 2024-01-26 PROCEDURE — 71045 X-RAY EXAM CHEST 1 VIEW: CPT

## 2024-01-26 PROCEDURE — 6370000000 HC RX 637 (ALT 250 FOR IP): Performed by: THORACIC SURGERY (CARDIOTHORACIC VASCULAR SURGERY)

## 2024-01-26 PROCEDURE — 97116 GAIT TRAINING THERAPY: CPT

## 2024-01-26 PROCEDURE — 82962 GLUCOSE BLOOD TEST: CPT

## 2024-01-26 PROCEDURE — 2100000001 HC CVICU R&B

## 2024-01-26 PROCEDURE — 6370000000 HC RX 637 (ALT 250 FOR IP): Performed by: INTERNAL MEDICINE

## 2024-01-26 PROCEDURE — 6370000000 HC RX 637 (ALT 250 FOR IP): Performed by: CLINICAL NURSE SPECIALIST

## 2024-01-26 RX ORDER — MULTIVITAMIN WITH IRON
1 TABLET ORAL DAILY
Refills: 1 | DISCHARGE
Start: 2024-01-27 | End: 2024-02-26

## 2024-01-26 RX ORDER — OXYCODONE HYDROCHLORIDE 5 MG/1
5 TABLET ORAL EVERY 8 HOURS PRN
Refills: 0 | DISCHARGE
Start: 2024-01-26 | End: 2024-01-29

## 2024-01-26 RX ORDER — LIDOCAINE 4 G/G
2 PATCH TOPICAL DAILY
DISCHARGE
Start: 2024-01-27 | End: 2024-02-03

## 2024-01-26 RX ORDER — AMIODARONE HYDROCHLORIDE 200 MG/1
TABLET ORAL
Qty: 84 TABLET | DISCHARGE
Start: 2024-01-26

## 2024-01-26 RX ORDER — ACETAMINOPHEN 325 MG/1
975 TABLET ORAL EVERY 6 HOURS PRN
Status: SHIPPED | COMMUNITY
Start: 2024-01-26

## 2024-01-26 RX ORDER — SENNA AND DOCUSATE SODIUM 50; 8.6 MG/1; MG/1
1 TABLET, FILM COATED ORAL 2 TIMES DAILY
Refills: 0 | DISCHARGE
Start: 2024-01-26 | End: 2024-02-09

## 2024-01-26 RX ORDER — DIPHENHYDRAMINE HCL 25 MG
25 CAPSULE ORAL NIGHTLY PRN
Status: SHIPPED | COMMUNITY
Start: 2024-01-26 | End: 2024-02-05

## 2024-01-26 RX ORDER — CASTOR OIL AND BALSAM, PERU 788; 87 MG/G; MG/G
OINTMENT TOPICAL 3 TIMES DAILY
DISCHARGE
Start: 2024-01-26

## 2024-01-26 RX ORDER — POLYETHYLENE GLYCOL 3350 17 G/17G
17 POWDER, FOR SOLUTION ORAL DAILY
Qty: 263 G | Refills: 0 | DISCHARGE
Start: 2024-01-27 | End: 2024-02-11

## 2024-01-26 RX ORDER — ASPIRIN 81 MG/1
81 TABLET ORAL DAILY
Qty: 30 TABLET | Refills: 1 | DISCHARGE
Start: 2024-01-26

## 2024-01-26 RX ORDER — LANOLIN ALCOHOL/MO/W.PET/CERES
6 CREAM (GRAM) TOPICAL NIGHTLY PRN
Refills: 3 | Status: SHIPPED | COMMUNITY
Start: 2024-01-26

## 2024-01-26 RX ORDER — TAMSULOSIN HYDROCHLORIDE 0.4 MG/1
0.4 CAPSULE ORAL
Qty: 30 CAPSULE | Refills: 1 | DISCHARGE
Start: 2024-01-26

## 2024-01-26 RX ADMIN — RIVAROXABAN 15 MG: 15 TABLET, FILM COATED ORAL at 18:18

## 2024-01-26 RX ADMIN — ACETAMINOPHEN 975 MG: 325 TABLET ORAL at 06:50

## 2024-01-26 RX ADMIN — ACETAMINOPHEN 975 MG: 325 TABLET ORAL at 22:57

## 2024-01-26 RX ADMIN — DOCUSATE SODIUM AND SENNOSIDES 1 TABLET: 8.6; 5 TABLET, FILM COATED ORAL at 22:26

## 2024-01-26 RX ADMIN — DOCUSATE SODIUM AND SENNOSIDES 1 TABLET: 8.6; 5 TABLET, FILM COATED ORAL at 10:09

## 2024-01-26 RX ADMIN — TAMSULOSIN HYDROCHLORIDE 0.4 MG: 0.4 CAPSULE ORAL at 22:26

## 2024-01-26 RX ADMIN — Medication: at 10:22

## 2024-01-26 RX ADMIN — Medication: at 21:44

## 2024-01-26 RX ADMIN — Medication: at 18:11

## 2024-01-26 RX ADMIN — POTASSIUM BICARBONATE 20 MEQ: 782 TABLET, EFFERVESCENT ORAL at 10:08

## 2024-01-26 RX ADMIN — INSULIN GLARGINE 14 UNITS: 100 INJECTION, SOLUTION SUBCUTANEOUS at 10:04

## 2024-01-26 RX ADMIN — SODIUM CHLORIDE, PRESERVATIVE FREE 10 ML: 5 INJECTION INTRAVENOUS at 10:22

## 2024-01-26 RX ADMIN — INSULIN LISPRO 4 UNITS: 100 INJECTION, SOLUTION INTRAVENOUS; SUBCUTANEOUS at 18:17

## 2024-01-26 RX ADMIN — METOPROLOL TARTRATE 12.5 MG: 25 TABLET, FILM COATED ORAL at 21:46

## 2024-01-26 RX ADMIN — INSULIN LISPRO 4 UNITS: 100 INJECTION, SOLUTION INTRAVENOUS; SUBCUTANEOUS at 10:06

## 2024-01-26 RX ADMIN — ACETAMINOPHEN 975 MG: 325 TABLET ORAL at 18:17

## 2024-01-26 RX ADMIN — THERA TABS 1 TABLET: TAB at 10:15

## 2024-01-26 RX ADMIN — METOPROLOL TARTRATE 12.5 MG: 25 TABLET, FILM COATED ORAL at 10:09

## 2024-01-26 RX ADMIN — LOVASTATIN 80 MG: 40 TABLET ORAL at 21:44

## 2024-01-26 RX ADMIN — ASPIRIN 81 MG: 81 TABLET, COATED ORAL at 10:14

## 2024-01-26 RX ADMIN — ACETAMINOPHEN 975 MG: 325 TABLET ORAL at 10:15

## 2024-01-26 RX ADMIN — SODIUM CHLORIDE, PRESERVATIVE FREE 10 ML: 5 INJECTION INTRAVENOUS at 21:31

## 2024-01-26 RX ADMIN — AMIODARONE HYDROCHLORIDE 400 MG: 200 TABLET ORAL at 10:10

## 2024-01-26 RX ADMIN — AMIODARONE HYDROCHLORIDE 400 MG: 200 TABLET ORAL at 21:44

## 2024-01-26 RX ADMIN — POLYETHYLENE GLYCOL 3350 17 G: 17 POWDER, FOR SOLUTION ORAL at 10:12

## 2024-01-26 RX ADMIN — Medication 400 MG: at 10:09

## 2024-01-26 NOTE — PLAN OF CARE
compliance.    Cardiac diagnosis intervention:  Patient instructed and educated on mindful movement principles based on “Move in The Tube” concept to include maintaining bilateral elbows close to rib cage when performing any load-bearing activity such as getting in/out of bed, pushing up from a chair, opening a door, or lifting a box.  Patient was given a handout with diagrams of each correct/incorrect method of performing each of the above tasks.                                                                                                                                                                                                                               Pain Ratin/10   Activity Tolerance:   Fair  and signs and symptoms of orthostatic hypotension    After treatment:   Patient left in no apparent distress in bed, Call bell within reach, and Side rails x3      COMMUNICATION/EDUCATION:   The patient's plan of care was discussed with: registered nurse    Patient Education  Education Given To: Patient  Education Provided: Transfer Training  Education Method: Verbal  Barriers to Learning: None  Education Outcome: Verbalized understanding;Demonstrated understanding      Marcelina Carl, PT  Minutes: 21

## 2024-01-26 NOTE — PROCEDURES
Pacer wire dressing removed, site cleaned with CHG, sutures x 2 cut, pacer wire x 3 cut at the level of his skin with gentle traction due to facility protocol. Pt tolerated well. RN updated.

## 2024-01-26 NOTE — PLAN OF CARE
patient is unable to maintain    IF patient discharges home will need the following DME: continuing to assess with progress     VITALS:   01/26/24 1141 01/26/24 1143 01/26/24 1147   Vitals   Pulse 77 83 94   Respirations  --  19 22   /64 118/67 (!) 81/57   MAP (Calculated) 88 84 65   MAP (mmHg) 85 83 (!) 64   Patient Position Other (Comment)  (recliner) Sitting  (c/o feeling slightly dizzy) Standing      01/26/24 1150   Vitals   Pulse  --    Respirations  --    /69   MAP (Calculated) 79   MAP (mmHg) 80   Patient Position Sitting  (sitting in hallway after getting nauseated)       SUBJECTIVE:   Patient stated “I feel like I need to throw up.”    OBJECTIVE DATA SUMMARY:   Cognitive/Behavioral Status:  Orientation  Overall Orientation Status: Within Functional Limits  Orientation Level: Oriented X4  Cognition  Overall Cognitive Status: WFL    Functional Mobility and Transfers for ADLs:  Bed Mobility:  Bed Mobility Training  Bed Mobility Training: Yes  Overall Level of Assistance: Minimum assistance;Assist X1;Additional time  Interventions: Verbal cues;Tactile cues;Safety awareness training  Sit to Supine: Assist X1;Minimum assistance     Transfers:   Transfer Training  Transfer Training: Yes  Overall Level of Assistance: Moderate assistance;Additional time;Adaptive equipment;Assist X1  Interventions: Verbal cues;Safety awareness training;Tactile cues  Sit to Stand: Moderate assistance;Assist X1  Stand to Sit: Minimum assistance;Assist X1    Balance:  Standing: Impaired  Balance  Sitting: Intact  Standing: Impaired  Standing - Static: Constant support;Fair  Standing - Dynamic: Constant support;Fair    ADL Intervention:           Education provided on:    -only touching incisions with clean hands  -not letting water directly hit incisions  -on 2-step sternal hygiene:  -using clean wash cloth and towel over incisions and separate wash cloth for other parts of body  -gentle washing over entire length of

## 2024-01-26 NOTE — DIABETES MGMT
[facility]  PROGRAM FOR DIABETES HEALTH  DIABETES MANAGEMENT CONSULT    Consulted by  Obey Lopez PA-C  for advanced nursing evaluation and care for inpatient blood glucose management.    Evaluation and Action Plan   Nicola Draper is a 78 year old gentleman, with nearly controlled Type 2 Diabetes on Jardiance and metformin, who is admitted with an NSTEMI and found to have multivessel CAD now s/p IABP placement and emergent CABG x4.  The Program for Diabetes Health has been consulted to assist in glycemic management and advanced diabetes management assessment this admission. His A1C is 7.3%, improved from 11% in October after intentional changes in diet.  His glucose was significantly elevated at 304 at admission and 10 units glargine was given.  Following CABG, he was started on an insulin gtt for management of diabetes and stress hyperglycemia with excellent control.  He transitioned off the insulin gtt with 7 units of glargine and  correctional insulin.  Glucose remained elevated and Lantus was increased to low dose weight based and bolus insulin added.  This weekend was complicated by ileus requiring NPO status.  All insulin  was held since ileus. Low dose glargine and humalog has resumed with adequate glycemic response.     Action Plan    Basal: Continue 0.15 units/kg/day: 14 units Lantus daily.     2. Correction: ACHS    3. Added consistent carbohydrate component of diet (60 grams CHO/meal)    4. Resume 4 units humalog/meal if eating more than 50% of meals and experiencing pre-prandial hyperglycemia.      Diabetes Management Team to sign off at this point as patient's blood glucose remains stable.  Please re-consult us if patient needs change.  Thank you for including us in their care.       Diabetes Discharge Plan   Medication  Resume metformin 1000mg BID and Glipizide 5mg BID.    He does have ASCVD and a GLP-1/SGLT2I are preferred agents.  He is not able to provide the high out of pocket costs with these.

## 2024-01-26 NOTE — CARE COORDINATION
Transition of Care Plan:     RUR: 17%  Prior Level of Functioning: Independent  Disposition: IPR_ [facility] - Approved     If SNF or IPR: Date FOC offered: 1/19/24  Date FOC received: 1/19/24  Accepting facility: [facility]  Date authorization started with reference number:  1/24/24  Date authorization received and expires:  Approved - Auth # [id number]  start 1/26/24 expires 2/2/24  Follow up appointments: PCP Specialist  DME needed: rollling walker and wheelchair if not going to IPR  Transportation at discharge: BLS to arrange for transportation   IM/IMM Medicare/ letter given: 2nd Im Letter to be given  Is patient a [place] and connected with VA? N/A              If yes, was [place] transfer form completed and VA notified?   Caregiver Contact: Amy Juarez                                           [phone] (Home Phone)   Discharge Caregiver contacted prior to discharge? If pt wishes  Care Conference needed? none  Barriers to discharge: Bed Availability      Updated note 3:00pm.  CM received the fax from [employer] with Auth info as provided above.    Initial Note:  CM received a call from [employer] [facility] that they are reviewing the case and needed a physician contact in cause they have questions while reviews CM provided Greg with Xuan Soler NP contact number.  Per [facility] they will fax information once approved or josie with P2P info if denied.     CM received a call from Xuan that pt has been approved. CM awaiting a fax from [employer].    CM called and spoke with [facility] liaison to update on approval and check bed availability.  Informed that [facility] does not have a bed available today, CM will be informed when bed is available.    ADAN Vick,RN  Care   [facility]  Phone: [phone]

## 2024-01-26 NOTE — PROGRESS NOTES
Physician Progress Note      PATIENT:               ROBERT CRISTINA  [facility] #:                  [id number]  :                       1945  ADMIT DATE:       2024 5:05 AM  DISCH DATE:  RESPONDING  PROVIDER #:        Rodriguez Anderson MD          QUERY TEXT:    Patient admitted with NSTEMI S/p CABG x4. Noted documentation of acute   respiratory failure in Cards progress note dated . In order to support the   diagnosis of acute respiratory failure, please include additional clinical   indicators in your documentation.  Or please document if the diagnosis of   acute respiratory failure has been ruled out after further study.    The medical record reflects the following:  Risk Factors: Transferred for NSTEMI S/p CABG on   Clinical Indicators: RR: 14; O2 Sats: 100% on Vent; CXR (): There are   heart size is stable. ET tube remains in place double lumen catheter in place.   Chest tubes in place. Minimal vascular prominence which  shallow inspiration no focal infiltrate pneumothorax or shift.  ?   Cards PN: Respiratory failure s/p intubation post CABG  --NOTED: \"Possible extubation later today\".     CTS NP PN: 2. Anticipated post op ventilator dependence: Keep sedated   until 6 hrs post IABP removal, then SAT/SBT, plan to extubate this afternoon.    Treatment: ICU Admit; ETT remains; CXR; Keep sedated until 6 hrs post IABP   removal; SAT/SBT    Thank you,    [email or web address]    Acute Respiratory Failure Clinical Indicators per 3M MS-DRG Training Guide and   Quick Reference Guide:  pO2 < 60 mmHg or SpO2 (pulse oximetry) < 91% breathing room air  pCO2 > 50 and pH < 7.35  P/F ratio (pO2 / FIO2) < 300  pO2 decrease or pCO2 increase by 10 mmHg from baseline (if known)  Supplemental oxygen of 40% or more  Presence of respiratory distress, tachypnea, dyspnea, shortness of breath,   wheezing  Unable to speak in complete sentences  Use of accessory muscles to breathe  Extreme

## 2024-01-26 NOTE — PROGRESS NOTES
1900: Bedside and Verbal shift change report given to PERLA Orona (oncoming nurse) by PERLA Triplett (offgoing nurse). Report included the following information Nurse Handoff Report, Index, Adult Overview, Surgery Report, Intake/Output, MAR, Recent Results, Med Rec Status, Cardiac Rhythm NSR, Quality Measures, and Neuro Assessment.     2000: Shift assessment complete. Pt is up in chair, A+Ox4, afebrile currently denies pain. Pt on RA, lungs clear/diminshed SpO2 98%. Pt in NSR, HDS, all pulses palpable, 2+ pitting edema on wrist and hand of LUE. Abd is tender at umbilical hernia site, BS hyperactive, pt denies nausea, good appetite this evening. Clear yellow urine draining via rodriguez. Skin dry and warm MSI clean dry and intact. See flow sheet for further details.    2200: Pt ambulated in hallway on RA, with rollator ~70 ft. Pt tolerated well and returned to chair.     2214: prn flexeril given for lower right back pain    0000: Reassessment complete. No acute changes. MSI care, chest tube dressing & epicardial wire care complete.    0400: Reassessment complete, no acute changes    0630: Pt weighed and up to chair.     0730: Bedside and Verbal shift change report given to PERLA Donovan (oncoming nurse) by PERLA Orona (offgoing nurse). Report included the following information Nurse Handoff Report, Index, Adult Overview, Surgery Report, Intake/Output, MAR, Recent Results, Med Rec Status, Cardiac Rhythm NSR, Alarm Parameters, Quality Measures, and Neuro Assessment.

## 2024-01-26 NOTE — PROGRESS NOTES
NAME: Nicola Draper        :  1945        MRN:  [id number]          Assessment :    Plan:  RODOLFO-    CKD 3B: Baseline CR appears to be around 1.5-1.7,  Likely due to early DN + hypertension.  Patient sees a local nephrologist in [place].       Horseshoe kidney  Hypertension  DM-2 with early DN  Mild BPH  Hypermagnesemia  CAD/NSTEMI  Status post CABG  A-fib Creatinine peaked at 2.5, now back to baseline of 1.7mg/dl (24); non-oliguric    rodriguez for urine retention-removal plan per Urology    No renal mass on CT     Lasix 20mg daily  Oral KCl    Mg high on  - decreased po Mg to daily    Repeat lab work in am           Subjective:     Chief Complaint:  No events overnight. Good UOP.  Alert/awake. No complaints    Review of Systems:    Symptom Y/N Comments  Symptom Y/N Comments   Fever/Chills    Chest Pain     Poor Appetite    Edema     Cough    Abdominal Pain     Sputum    Joint Pain     SOB/SHELTON    Pruritis/Rash     Nausea/vomit    Tolerating PT/OT     Diarrhea    Tolerating Diet     Constipation    Other       Could not obtain due to:      Objective:     VITALS:   Last 24hrs VS reviewed since prior progress note. Most recent are:  Vitals:    24 0500   BP: (!) 135/55   Pulse: 69   Resp: 17   Temp:    SpO2: 94%       Intake/Output Summary (Last 24 hours) at 2024 0640  Last data filed at 2024 0400  Gross per 24 hour   Intake 440 ml   Output 3045 ml   Net -2605 ml        Telemetry Reviewed:     PHYSICAL EXAM:  General: NAD  No LE edema  Rodriguez      Lab Data Reviewed: (see below)    Medications Reviewed: (see below)    PMH/SH reviewed - no change compared to H&P  ________________________________________________________________________  Care Plan discussed with:  Patient Y    Family      RN Y    Care Manager                    Consultant:          Comments   >50% of visit spent in counseling and coordination of care

## 2024-01-26 NOTE — PROGRESS NOTES
[facility] FLOOR Progress Note    Admit Date: 2024  POD: 10 Days Post-Op      Procedure:  Procedure(s):  ON PUMP CORONARY ARTERY BYPASS (CABG) x4 WITH ENDOSCOPIC VEIN HARVEST (EVH) AND CARDIOPLEGIA (NO PAC). INTRAOPERATIVE MOOKIE DONE BY DR. GRAJEDA. EVH DONE BY JOSE SHIN.      Subjective/overnight events:   Pt seen with Dr. Maharaj, up in the chair.. In NSR, on room air, afebrile. Vital signs stable. Yesterday AM, had an episode of vomiting after taking all of his AM meds. Was re-evaluated by Gen Surg who repeated a CT scan, which was negative for ileus. He also had an episode of orthostatic hypotension when working with PT. No events overnight.  Pain and breathing are okay, no N/V this AM \"just a little tired\".      Objective:     /62   Pulse 73   Temp 98.1 °F (36.7 °C) (Oral)   Resp (!) 9   Ht 1.829 m (6')   Wt 87.7 kg (193 lb 4.8 oz)   SpO2 95%   BMI 26.22 kg/m²   Temp (24hrs), Av.9 °F (36.6 °C), Min:97.6 °F (36.4 °C), Max:98.1 °F (36.7 °C)      Last 24hr Input/Output:    Intake/Output Summary (Last 24 hours) at 2024 0730  Last data filed at 2024 0651  Gross per 24 hour   Intake 840 ml   Output 3250 ml   Net -2410 ml      EKG/Rhythm:   Encounter Date: 24   EKG 12 Lead   Result Value    Ventricular Rate 60    Atrial Rate 60    P-R Interval 164    QRS Duration 94    Q-T Interval 372    QTc Calculation (Bazett) 372    P Axis -2    R Axis -42    T Axis -14    Diagnosis      Normal sinus rhythm  Left axis deviation  Low voltage QRS  Inferior infarct (cited on or before 2024)  Cannot rule out Anterior infarct (cited on or before 2024)  Abnormal ECG  When compared with ECG of 2024 05:35,  Sinus rhythm has replaced Atrial fibrillation  Serial changes of Anterior infarct present       Oxygen: As above    CXR: Xray Result (most recent):  XR CHEST PORTABLE 2024    Narrative  EXAM: XR CHEST PORTABLE    INDICATION: Post op open heart surgery    COMPARISON:

## 2024-01-27 ENCOUNTER — APPOINTMENT (OUTPATIENT)
Facility: HOSPITAL | Age: 79
DRG: 233 | End: 2024-01-27
Payer: MEDICARE

## 2024-01-27 VITALS
RESPIRATION RATE: 19 BRPM | TEMPERATURE: 97.7 F | WEIGHT: 192.9 LBS | DIASTOLIC BLOOD PRESSURE: 68 MMHG | HEIGHT: 72 IN | BODY MASS INDEX: 26.13 KG/M2 | OXYGEN SATURATION: 98 % | SYSTOLIC BLOOD PRESSURE: 111 MMHG | HEART RATE: 75 BPM

## 2024-01-27 LAB
ANION GAP SERPL CALC-SCNC: 1 MMOL/L (ref 5–15)
BUN SERPL-MCNC: 36 MG/DL (ref 6–20)
BUN/CREAT SERPL: 21 (ref 12–20)
CALCIUM SERPL-MCNC: 9.2 MG/DL (ref 8.5–10.1)
CHLORIDE SERPL-SCNC: 110 MMOL/L (ref 97–108)
CO2 SERPL-SCNC: 28 MMOL/L (ref 21–32)
CREAT SERPL-MCNC: 1.71 MG/DL (ref 0.7–1.3)
ERYTHROCYTE [DISTWIDTH] IN BLOOD BY AUTOMATED COUNT: 14.2 % (ref 11.5–14.5)
GLUCOSE BLD STRIP.AUTO-MCNC: 162 MG/DL (ref 65–117)
GLUCOSE BLD STRIP.AUTO-MCNC: 180 MG/DL (ref 65–117)
GLUCOSE SERPL-MCNC: 155 MG/DL (ref 65–100)
HCT VFR BLD AUTO: 32.6 % (ref 36.6–50.3)
HGB BLD-MCNC: 10.6 G/DL (ref 12.1–17)
MCH RBC QN AUTO: 30.2 PG (ref 26–34)
MCHC RBC AUTO-ENTMCNC: 32.5 G/DL (ref 30–36.5)
MCV RBC AUTO: 92.9 FL (ref 80–99)
NRBC # BLD: 0 K/UL (ref 0–0.01)
NRBC BLD-RTO: 0 PER 100 WBC
PLATELET # BLD AUTO: 232 K/UL (ref 150–400)
PMV BLD AUTO: 8.7 FL (ref 8.9–12.9)
POTASSIUM SERPL-SCNC: 4.2 MMOL/L (ref 3.5–5.1)
RBC # BLD AUTO: 3.51 M/UL (ref 4.1–5.7)
SERVICE CMNT-IMP: ABNORMAL
SERVICE CMNT-IMP: ABNORMAL
SODIUM SERPL-SCNC: 139 MMOL/L (ref 136–145)
WBC # BLD AUTO: 5.2 K/UL (ref 4.1–11.1)

## 2024-01-27 PROCEDURE — 6370000000 HC RX 637 (ALT 250 FOR IP)

## 2024-01-27 PROCEDURE — 2580000003 HC RX 258

## 2024-01-27 PROCEDURE — 85027 COMPLETE CBC AUTOMATED: CPT

## 2024-01-27 PROCEDURE — 36415 COLL VENOUS BLD VENIPUNCTURE: CPT

## 2024-01-27 PROCEDURE — 82962 GLUCOSE BLOOD TEST: CPT

## 2024-01-27 PROCEDURE — 6370000000 HC RX 637 (ALT 250 FOR IP): Performed by: INTERNAL MEDICINE

## 2024-01-27 PROCEDURE — 80048 BASIC METABOLIC PNL TOTAL CA: CPT

## 2024-01-27 PROCEDURE — 71045 X-RAY EXAM CHEST 1 VIEW: CPT

## 2024-01-27 PROCEDURE — 6370000000 HC RX 637 (ALT 250 FOR IP): Performed by: CLINICAL NURSE SPECIALIST

## 2024-01-27 RX ADMIN — THERA TABS 1 TABLET: TAB at 08:57

## 2024-01-27 RX ADMIN — Medication: at 09:04

## 2024-01-27 RX ADMIN — DOCUSATE SODIUM AND SENNOSIDES 1 TABLET: 8.6; 5 TABLET, FILM COATED ORAL at 08:57

## 2024-01-27 RX ADMIN — INSULIN GLARGINE 14 UNITS: 100 INJECTION, SOLUTION SUBCUTANEOUS at 08:58

## 2024-01-27 RX ADMIN — SODIUM CHLORIDE, PRESERVATIVE FREE 10 ML: 5 INJECTION INTRAVENOUS at 09:02

## 2024-01-27 RX ADMIN — ASPIRIN 81 MG: 81 TABLET, COATED ORAL at 08:57

## 2024-01-27 RX ADMIN — AMIODARONE HYDROCHLORIDE 400 MG: 200 TABLET ORAL at 08:57

## 2024-01-27 RX ADMIN — ACETAMINOPHEN 975 MG: 325 TABLET ORAL at 14:00

## 2024-01-27 RX ADMIN — METOPROLOL TARTRATE 12.5 MG: 25 TABLET, FILM COATED ORAL at 08:57

## 2024-01-27 RX ADMIN — Medication 400 MG: at 08:58

## 2024-01-27 NOTE — PROGRESS NOTES
Continued progress  Less dizziness with ambulation  Labs stable  Hopefully to [facility] today

## 2024-01-27 NOTE — PROGRESS NOTES
NAME: Nicola Draper        :  1945        MRN:  [id number]          Assessment :    Plan:  RODOLFO-    CKD 3B: Baseline CR appears to be around 1.5-1.7,  Likely due to early DN + hypertension.  Patient sees a local nephrologist in [place].       Horseshoe kidney  Hypertension  DM-2 with early DN  Mild BPH  Hypermagnesemia  CAD/NSTEMI  Status post CABG  A-fib Creatinine peaked at 2.5, now back to baseline of 1.7mg/dl (24); non-oliguric    rodriguez for urine retention-removal plan per Urology    No renal mass on CT     K stable    Mg high on  - decreased po Mg to daily    Awaiting rehab         Subjective:     Chief Complaint:  No events overnight. Good UOP.  Sitting up in the chair. Alert/awake. No complaints    Review of Systems:    Symptom Y/N Comments  Symptom Y/N Comments   Fever/Chills    Chest Pain     Poor Appetite    Edema     Cough    Abdominal Pain     Sputum    Joint Pain     SOB/SHELTON    Pruritis/Rash     Nausea/vomit    Tolerating PT/OT     Diarrhea    Tolerating Diet     Constipation    Other       Could not obtain due to:      Objective:     VITALS:   Last 24hrs VS reviewed since prior progress note. Most recent are:  Vitals:    24 0751   BP: 139/75   Pulse: 85   Resp: 19   Temp: 98.8 °F (37.1 °C)   SpO2: 97%       Intake/Output Summary (Last 24 hours) at 2024 0851  Last data filed at 2024 0800  Gross per 24 hour   Intake 1450 ml   Output 2340 ml   Net -890 ml        Telemetry Reviewed:     PHYSICAL EXAM:  General: NAD  No significant LE edema  Rodriguez      Lab Data Reviewed: (see below)    Medications Reviewed: (see below)    PMH/SH reviewed - no change compared to H&P  ________________________________________________________________________  Care Plan discussed with:  Patient Y    Family      RN Y    Care Manager                    Consultant:          Comments   >50% of visit spent in counseling and

## 2024-01-27 NOTE — PROGRESS NOTES
0735 Bedside report received. Pt began vomiting small amount of clear emesis. Pt feels like it was because his stomach was empty. Pt given ginger ale and crackers.    0940 Pt ate eggs, banana and drank coffee for breakfast, then ambulated to bathroom and had large, soft BM. Miralax held    1300 Pt ate about 25% of lunch. Pt accepted at [facility] today, Transport set up for 3pm.    1430 CM on unit. Transport delayed. Awaiting updated time    1555  [facility] home arrived for transport to [facility]. EMTALA complete.

## 2024-01-27 NOTE — PROGRESS NOTES
Patient slept well overnight, denies pain, trial to walk him to am, after few steps in the hallway he feel dizzy  > BP still ok, set him in chair and returned him back to recliner in his room, started to feel a little bit of Nausea, active BS no BM, good UOP, still left arm edema about +2, sacrum, buttock and left thigh skin issue still the same,      01/27/24 0556 01/27/24 0557 01/27/24 0600   Vitals   Pulse 66 66 72   Respirations 22 20 24   /61 133/61 110/64   MAP (Calculated) 83 85 79   MAP (mmHg) 81 82 78   Patient Position Supine Sitting  (in bed) Sitting  (with legs down)      01/27/24 0601 01/27/24 0604 01/27/24 0643   Vitals   Pulse 76 75  --    Respirations 21 23  --    BP (!) 125/49 132/68 113/66   MAP (Calculated) 74 89 82   MAP (mmHg) 70 88 79   Patient Position Standing Up in chair  --       01/27/24 0645 01/27/24 0700   Vitals   Pulse 76 69   Respirations 13 13   /66 130/61   MAP (Calculated) 88 84   MAP (mmHg) 85 83   Patient Position Sitting Sitting     0700  Bedside and Verbal shift change report given to PERLA Vick (oncoming nurse) by PERLA Ta (offgoing nurse). Report included the following information SBAR, Kardex, Intake/Output, MAR, Accordion, Recent Results, Med Rec Status and Cardiac Rhythm SR.

## 2024-01-27 NOTE — CARE COORDINATION
Transition of Care Plan:     RUR: 17% (High RUR)  Prior Level of Functioning: Independent ADLS/IADLS  Disposition: IPR_ MUNIRA - Approved  If SNF or IPR: Date FOC offered: 1/19/24  Date FOC received: 1/19/24  Accepting facility: [facility]  Date authorization started with reference number:  1/24/24  Date authorization received and expires:  Approved - Auth # [id number]  start 1/26/24 expires 2/2/24  Follow up appointments: PCP Specialist  DME needed: rollling walker and wheelchair if not going to IPR  Transportation at discharge: [facility] [place] Phone:  [phone]    IM/IMM Medicare/ letter given: 2nd Im Letter 01/26  Is patient a [place] and connected with [facility]? N/A              If yes, was [facility] transfer form completed and VA notified?   Caregiver Contact: mAy Juarez                                           [phone] (Home Phone)   Discharge Caregiver contacted prior to discharge? If pt wishes  Care Conference needed? none  Barriers to discharge: None    1447 ALVERTO was able to reach the NP on call,[phone]  who requested for the EMTALA document to be faxed to [phone]. ALVERTO provided the information to the primary RN    1319 ALVERTO spoke to [place] [phone] who responded with the following,Dr. Vazquez is the accepting MD, he will go to room 2024, and report number is [phone]. Please set transport up for 3:00 pm.  [facility] was set up. The primary nurse was notified, Nava. ALVERTO attempted to connect with Dr. Cabrera and Dr. Maharaj to provide EMTALA information but the number does not work on [employer] Reyes (Girlfriend)  [phone] was notified by ALVERTO.     ALVERTO contacted [facility]: Physical Rehabilitation [facility] Phone: [phone] Address: [address], [place] [address] to inquire if a bed is available but no answer    9:15 ALVERTO spoke to [facility] [phone]  who is checking if there is a bed available        01/27/24 1326   Services

## 2024-01-29 ENCOUNTER — TELEPHONE (OUTPATIENT)
Facility: HOSPITAL | Age: 79
End: 2024-01-29

## 2024-01-29 DIAGNOSIS — Z95.1 STATUS POST CORONARY ARTERY BYPASS GRAFT: Primary | ICD-10-CM

## 2024-01-29 LAB — ECHO BSA: 2.07 M2

## 2024-01-31 ENCOUNTER — TELEPHONE (OUTPATIENT)
Dept: FAMILY MEDICINE CLINIC | Age: 79
End: 2024-01-31

## 2024-01-31 NOTE — TELEPHONE ENCOUNTER
Betzy is planning pt's discharge for [facility] for 02/08/2024. She made pt a hospital follow up appt for 02/12/2024 with you.       Pt needs a matheus call after 02/08/2024 when he gets home. [phone]

## 2024-02-01 NOTE — TELEPHONE ENCOUNTER
We can't do PAWAN call on him since he was transferred to [facility]

## 2024-02-22 ENCOUNTER — OFFICE VISIT (OUTPATIENT)
Age: 79
End: 2024-02-22

## 2024-02-22 VITALS
DIASTOLIC BLOOD PRESSURE: 68 MMHG | OXYGEN SATURATION: 99 % | HEART RATE: 56 BPM | WEIGHT: 178.1 LBS | SYSTOLIC BLOOD PRESSURE: 152 MMHG | RESPIRATION RATE: 17 BRPM | BODY MASS INDEX: 24.15 KG/M2 | TEMPERATURE: 97.8 F

## 2024-02-22 DIAGNOSIS — I25.10 CORONARY ARTERY DISEASE INVOLVING NATIVE CORONARY ARTERY OF NATIVE HEART WITHOUT ANGINA PECTORIS: Primary | ICD-10-CM

## 2024-02-22 PROCEDURE — 99024 POSTOP FOLLOW-UP VISIT: CPT | Performed by: NURSE PRACTITIONER

## 2024-02-22 RX ORDER — AMLODIPINE BESYLATE 5 MG/1
5 TABLET ORAL DAILY
COMMUNITY

## 2024-02-22 RX ORDER — GLIPIZIDE 5 MG/1
5 TABLET ORAL 2 TIMES DAILY
Qty: 60 TABLET | Refills: 3 | Status: SHIPPED | OUTPATIENT
Start: 2024-02-22

## 2024-02-22 RX ORDER — TAMSULOSIN HYDROCHLORIDE 0.4 MG/1
0.4 CAPSULE ORAL DAILY
Qty: 30 CAPSULE | Refills: 1 | Status: SHIPPED | OUTPATIENT
Start: 2024-02-22

## 2024-02-22 NOTE — PROGRESS NOTES
Nicola Draper reported today for the following:    Chief Complaint   Patient presents with    Coronary Artery Disease     1 month post CABG follow up       Reviewed record in preparation for visit and have obtained necessary documentation. Identified pt with two pt identifiers (name and ).     1. Have you been to the ER, urgent care clinic since your last visit?  Hospitalized since your last visit?No    2. Have you seen or consulted any other health care providers outside of the [facility] since your last visit?  Include any pap smears or colon screening. No    BP (!) 152/68   Pulse 56   Temp 97.8 °F (36.6 °C) (Oral)   Resp 17   Wt 80.8 kg (178 lb 1.6 oz)   SpO2 99%   BMI 24.15 kg/m²     Medications reviewed/approved by provider.    No results found for this visit on 24.         Madie Hernandez RN

## 2024-02-22 NOTE — PROGRESS NOTES
Patient: Nicola Draper   Age: 79 y.o.     Patient Care Team:  Danyell Toussaint MD as PCP - General  Danyell Toussaint MD [facility]  Rodriguez Anderson MD as Consulting Physician (Cardiology)  Nguyễn Maharaj MD (Cardiothoracic Surgery)  Therese Escobar MD (General Surgery)    Diagnosis: NSTEMI, CAD s/p Emergent CABG    Problem List:   Patient Active Problem List   Diagnosis    Essential hypertension    Obesity (BMI 30.0-34.9)    Type 2 diabetes mellitus with hyperglycemia, without long-term current use of insulin (HCC)    CAD (coronary artery disease), native coronary artery    Chronic renal disease, stage III ([facility])    NSTEMI (non-ST elevated myocardial infarction) ([facility])    S/P CABG x 4    Stress hyperglycemia    Ileus ([facility])        Date of Surgery: 1-16-24     Surgery: ON PUMP CORONARY ARTERY BYPASS (CABG) x4 WITH ENDOSCOPIC VEIN HARVEST (EVH) AND CARDIOPLEGIA (NO PAC). INTRAOPERATIVE MOOKIE DONE BY DR. GRAJEDA. EVH DONE BY JOSE SHIN.      HPI:  Pt is here for post op follow up. Pt is here with his daughter.  He lives a good distance away.  He says he is doing well. No fevers, chills.  No problems sleeping, walking or with his bowels.  He is having hesitancy with voiding and requested Flomax.     Current Medications:   Current Outpatient Medications   Medication Sig Dispense Refill    amiodarone (CORDARONE) 200 MG tablet Take two tablets twice daily x 2 weeks, then take two tablets once daily x 2 weeks, then STOP 84 tablet     balsum peru-castor oil (VENELEX) OINT ointment Apply topically in the morning, at noon, and at bedtime      melatonin 3 MG TABS tablet Take 2 tablets by mouth nightly as needed (sleep)  3    metoprolol tartrate (LOPRESSOR) 25 MG tablet Take 0.5 tablets by mouth 2 times daily 30 tablet 1    Multiple Vitamin (MULTIVITAMIN) TABS tablet Take 1 tablet by mouth daily  1    rivaroxaban (XARELTO) 15 MG TABS tablet Take 1 tablet by mouth daily 30 tablet 1    tamsulosin

## 2024-03-13 ENCOUNTER — OFFICE VISIT (OUTPATIENT)
Dept: FAMILY MEDICINE CLINIC | Age: 79
End: 2024-03-13
Payer: MEDICARE

## 2024-03-13 VITALS
DIASTOLIC BLOOD PRESSURE: 76 MMHG | HEART RATE: 65 BPM | RESPIRATION RATE: 16 BRPM | BODY MASS INDEX: 25.06 KG/M2 | TEMPERATURE: 97 F | SYSTOLIC BLOOD PRESSURE: 161 MMHG | HEIGHT: 72 IN | WEIGHT: 185 LBS | OXYGEN SATURATION: 98 %

## 2024-03-13 DIAGNOSIS — I25.10 ATHEROSCLEROSIS OF NATIVE CORONARY ARTERY OF NATIVE HEART WITHOUT ANGINA PECTORIS: ICD-10-CM

## 2024-03-13 DIAGNOSIS — N40.1 BENIGN PROSTATIC HYPERPLASIA WITH URINARY FREQUENCY: ICD-10-CM

## 2024-03-13 DIAGNOSIS — E78.2 MIXED HYPERLIPIDEMIA: ICD-10-CM

## 2024-03-13 DIAGNOSIS — R35.0 BENIGN PROSTATIC HYPERPLASIA WITH URINARY FREQUENCY: ICD-10-CM

## 2024-03-13 DIAGNOSIS — E11.21 TYPE 2 DIABETES MELLITUS WITH DIABETIC NEPHROPATHY, WITHOUT LONG-TERM CURRENT USE OF INSULIN (HCC): Primary | ICD-10-CM

## 2024-03-13 PROCEDURE — 3078F DIAST BP <80 MM HG: CPT | Performed by: FAMILY MEDICINE

## 2024-03-13 PROCEDURE — 1036F TOBACCO NON-USER: CPT | Performed by: FAMILY MEDICINE

## 2024-03-13 PROCEDURE — G8484 FLU IMMUNIZE NO ADMIN: HCPCS | Performed by: FAMILY MEDICINE

## 2024-03-13 PROCEDURE — G8419 CALC BMI OUT NRM PARAM NOF/U: HCPCS | Performed by: FAMILY MEDICINE

## 2024-03-13 PROCEDURE — 99214 OFFICE O/P EST MOD 30 MIN: CPT | Performed by: FAMILY MEDICINE

## 2024-03-13 PROCEDURE — 1123F ACP DISCUSS/DSCN MKR DOCD: CPT | Performed by: FAMILY MEDICINE

## 2024-03-13 PROCEDURE — 3051F HG A1C>EQUAL 7.0%<8.0%: CPT | Performed by: FAMILY MEDICINE

## 2024-03-13 PROCEDURE — G8428 CUR MEDS NOT DOCUMENT: HCPCS | Performed by: FAMILY MEDICINE

## 2024-03-13 PROCEDURE — 36415 COLL VENOUS BLD VENIPUNCTURE: CPT | Performed by: FAMILY MEDICINE

## 2024-03-13 PROCEDURE — 3077F SYST BP >= 140 MM HG: CPT | Performed by: FAMILY MEDICINE

## 2024-03-13 RX ORDER — LOVASTATIN 40 MG/1
TABLET ORAL
Qty: 180 TABLET | Refills: 0 | Status: SHIPPED | OUTPATIENT
Start: 2024-03-13

## 2024-03-13 RX ORDER — TAMSULOSIN HYDROCHLORIDE 0.4 MG/1
0.4 CAPSULE ORAL DAILY
Qty: 30 CAPSULE | Refills: 1 | Status: SHIPPED | OUTPATIENT
Start: 2024-03-13

## 2024-03-13 RX ORDER — GLIPIZIDE 5 MG/1
5 TABLET ORAL 2 TIMES DAILY
Qty: 60 TABLET | Refills: 3 | Status: SHIPPED | OUTPATIENT
Start: 2024-03-13

## 2024-03-13 RX ORDER — BLOOD SUGAR DIAGNOSTIC
1 STRIP MISCELLANEOUS DAILY
Qty: 100 EACH | Refills: 3 | Status: SHIPPED | OUTPATIENT
Start: 2024-03-13

## 2024-03-13 RX ORDER — ASPIRIN 81 MG/1
81 TABLET ORAL DAILY
Qty: 30 TABLET | Refills: 1 | Status: SHIPPED | OUTPATIENT
Start: 2024-03-13

## 2024-03-13 ASSESSMENT — PATIENT HEALTH QUESTIONNAIRE - PHQ9
SUM OF ALL RESPONSES TO PHQ QUESTIONS 1-9: 0
2. FEELING DOWN, DEPRESSED OR HOPELESS: 0
SUM OF ALL RESPONSES TO PHQ QUESTIONS 1-9: 0
1. LITTLE INTEREST OR PLEASURE IN DOING THINGS: 0
SUM OF ALL RESPONSES TO PHQ9 QUESTIONS 1 & 2: 0

## 2024-03-13 NOTE — PROGRESS NOTES
Successful venipuncture in patient's Left AC X 1 sticks.  The patient tolerated this procedure w/o c/o pain or discomfort.  
(XARELTO) 15 MG TABS tablet Take 1 tablet by mouth daily 30 tablet 1    acetaminophen (TYLENOL) 325 MG tablet Take 3 tablets by mouth every 6 hours as needed for Pain or Fever      aspirin 81 MG EC tablet Take 1 tablet by mouth daily 30 tablet 1    glucose monitoring kit Check Glucose once a day in am fasting and occ 2 h after a meal for DM E11.9      glucose monitoring kit 1 kit by Does not apply route daily Accu-Chek meter to Check Glucose once a day for DM E11.9 1 kit 0    Lancets MISC Check Glucose once a day for DM E11.9      balsum peru-castor oil (VENELEX) OINT ointment Apply topically in the morning, at noon, and at bedtime      Multiple Vitamin (MULTIVITAMIN) TABS tablet Take 1 tablet by mouth daily (Patient not taking: Reported on 2/22/2024)  1     No current facility-administered medications for this visit.             Physical Examination:    BP (!) 161/76 (Site: Left Upper Arm, Position: Sitting, Cuff Size: Medium Adult)   Pulse 65   Temp 97 °F (36.1 °C) (Oral)   Resp 16   Ht 1.829 m (6')   Wt 83.9 kg (185 lb)   SpO2 98%   BMI 25.09 kg/m²    General:  Alert, cooperative, no distress.   HEENT:  Normocephalic, without obvious abnormality, atraumatic.Conjunctivae/corneas clear. Pupils equal, round, reactive to light. Extraocular movements intact.TMs and external canals normal bilaterally. Nasal mucosa and oropharynx clear.   Lungs: Clear to auscultation bilaterally.   Chest wall:  No tenderness or deformity.   Heart:  Regular rate and rhythm, S1, S2 normal, no murmur, click, rub, or gallop.   Abdomen:   Soft, non-tender. Bowel sounds normal. No masses. No organomegaly.   Extremities: Extremities normal, atraumatic, no cyanosis or edema.   Pulses: 2+ and symmetric all extremities.   Skin: Skin color, texture, turgor normal. No rashes or lesions.   Lymph nodes: Cervical, supraclavicular, and axillary nodes normal.   Neurologic: CNII-XII intact. Normal strength, sensation, and reflexes throughout.

## 2024-03-15 LAB
ANION GAP SERPL CALC-SCNC: 7 MMOL/L (ref 5–15)
BUN SERPL-MCNC: 28 MG/DL (ref 6–20)
BUN/CREAT SERPL: 14 (ref 12–20)
CALCIUM SERPL-MCNC: 10.4 MG/DL (ref 8.5–10.1)
CHLORIDE SERPL-SCNC: 109 MMOL/L (ref 97–108)
CO2 SERPL-SCNC: 24 MMOL/L (ref 21–32)
CREAT SERPL-MCNC: 2.06 MG/DL (ref 0.7–1.3)
EST. AVERAGE GLUCOSE BLD GHB EST-MCNC: 148 MG/DL
GLUCOSE SERPL-MCNC: 115 MG/DL (ref 65–100)
HBA1C MFR BLD: 6.8 % (ref 4–5.6)
POTASSIUM SERPL-SCNC: 4.8 MMOL/L (ref 3.5–5.1)
SODIUM SERPL-SCNC: 140 MMOL/L (ref 136–145)

## 2024-04-04 ENCOUNTER — OFFICE VISIT (OUTPATIENT)
Dept: FAMILY MEDICINE CLINIC | Age: 79
End: 2024-04-04
Payer: MEDICARE

## 2024-04-04 VITALS
DIASTOLIC BLOOD PRESSURE: 62 MMHG | HEIGHT: 70 IN | TEMPERATURE: 97.8 F | RESPIRATION RATE: 12 BRPM | BODY MASS INDEX: 26.48 KG/M2 | WEIGHT: 185 LBS | SYSTOLIC BLOOD PRESSURE: 136 MMHG | HEART RATE: 66 BPM | OXYGEN SATURATION: 95 %

## 2024-04-04 DIAGNOSIS — Z00.00 MEDICARE ANNUAL WELLNESS VISIT, SUBSEQUENT: Primary | ICD-10-CM

## 2024-04-04 PROCEDURE — 1123F ACP DISCUSS/DSCN MKR DOCD: CPT | Performed by: FAMILY MEDICINE

## 2024-04-04 PROCEDURE — 3078F DIAST BP <80 MM HG: CPT | Performed by: FAMILY MEDICINE

## 2024-04-04 PROCEDURE — 3075F SYST BP GE 130 - 139MM HG: CPT | Performed by: FAMILY MEDICINE

## 2024-04-04 PROCEDURE — G0439 PPPS, SUBSEQ VISIT: HCPCS | Performed by: FAMILY MEDICINE

## 2024-04-04 ASSESSMENT — PATIENT HEALTH QUESTIONNAIRE - PHQ9
2. FEELING DOWN, DEPRESSED OR HOPELESS: NOT AT ALL
SUM OF ALL RESPONSES TO PHQ QUESTIONS 1-9: 0
SUM OF ALL RESPONSES TO PHQ QUESTIONS 1-9: 0
SUM OF ALL RESPONSES TO PHQ9 QUESTIONS 1 & 2: 0
1. LITTLE INTEREST OR PLEASURE IN DOING THINGS: NOT AT ALL
SUM OF ALL RESPONSES TO PHQ QUESTIONS 1-9: 0
SUM OF ALL RESPONSES TO PHQ QUESTIONS 1-9: 0

## 2024-04-04 ASSESSMENT — ENCOUNTER SYMPTOMS
SHORTNESS OF BREATH: 0
COUGH: 0

## 2024-04-04 NOTE — PROGRESS NOTES
\"Have you been to the ER, urgent care clinic since your last visit?  Hospitalized since your last visit?\"    NO    “Have you seen or consulted any other health care providers outside of [facility] since your last visit?”    NO            Click Here for Release of Records Request  
  Allergen Reactions    Seasonal Anaphylaxis     Bee sting, swelling, throat swells, can't breathe    Atorvastatin Myalgia    Other      Bee stiing    Rosuvastatin Myalgia    Simvastatin Myalgia     Prior to Visit Medications    Medication Sig Taking? Authorizing Provider   empagliflozin (JARDIANCE) 10 MG tablet Take 1 tablet by mouth daily Yes Buddy Stringer MD   glipiZIDE (GLUCOTROL) 5 MG tablet Take 1 tablet by mouth 2 times daily Before meals. Yes Buddy Stringer MD   lovastatin (MEVACOR) 40 MG tablet Take 2 at night Yes Buddy Stringer MD   metoprolol tartrate (LOPRESSOR) 25 MG tablet Take 0.5 tablets by mouth 2 times daily Yes Buddy Srtinger MD   tamsulosin (FLOMAX) 0.4 MG capsule Take 1 capsule by mouth daily Yes Buddy Stringer MD   blood glucose test strips (ACCU-CHEK GUIDE) strip 1 each by In Vitro route daily As needed. Yes Buddy Stringer MD   aspirin 81 MG EC tablet Take 1 tablet by mouth daily Yes Buddy Stringer MD   acetaminophen (TYLENOL) 325 MG tablet Take 3 tablets by mouth every 6 hours as needed for Pain or Fever Yes Xuan Soler APRN - NP   glucose monitoring kit Check Glucose once a day in am fasting and occ 2 h after a meal for DM E11.9 Yes Franc Chatterjee MD   glucose monitoring kit 1 kit by Does not apply route daily Accu-Chek meter to Check Glucose once a day for DM E11.9 Yes Danyell Toussaint MD   Lancets MISC Check Glucose once a day for DM E11.9 Yes Automatic Reconciliation, Ar   Multiple Vitamin (MULTIVITAMIN) TABS tablet Take 1 tablet by mouth daily  Patient not taking: Reported on 2/22/2024  Xuan Soler APRN - NP       CareTeam (Including outside providers/suppliers regularly involved in providing care):   Patient Care Team:  Danyell Toussaint MD as PCP - General  Danyell Toussaint MD as PCP - Empaneled Provider  Rodriguez Anderson MD as Consulting Physician (Cardiology)  Nguyễn Maharaj MD (Cardiothoracic

## 2024-04-04 NOTE — PATIENT INSTRUCTIONS
Learning About Being Active as an Older Adult  Why is being active important as you get older?     Being active is one of the best things you can do for your health. And it's never too late to start. Being active--or getting active, if you aren't already--has definite benefits. It can:  Give you more energy,  Keep your mind sharp.  Improve balance to reduce your risk of falls.  Help you manage chronic illness with fewer medicines.  No matter how old you are, how fit you are, or what health problems you have, there is a form of activity that will work for you. And the more physical activity you can do, the better your overall health will be.  What kinds of activity can help you stay healthy?  Being more active will make your daily activities easier. Physical activity includes planned exercise and things you do in daily life. There are four types of activity:  Aerobic.  Doing aerobic activity makes your heart and lungs strong.  Includes walking, dancing, and gardening.  Aim for at least 2½ hours spread throughout the week.  It improves your energy and can help you sleep better.  Muscle-strengthening.  This type of activity can help maintain muscle and strengthen bones.  Includes climbing stairs, using resistance bands, and lifting or carrying heavy loads.  Aim for at least twice a week.  It can help protect the knees and other joints.  Stretching.  Stretching gives you better range of motion in joints and muscles.  Includes upper arm stretches, calf stretches, and gentle yoga.  Aim for at least twice a week, preferably after your muscles are warmed up from other activities.  It can help you function better in daily life.  Balancing.  This helps you stay coordinated and have good posture.  Includes heel-to-toe walking, sergei chi, and certain types of yoga.  Aim for at least 3 days a week.  It can reduce your risk of falling.  Even if you have a hard time meeting the recommendations, it's better to be more active

## 2024-04-12 DIAGNOSIS — N40.1 BENIGN PROSTATIC HYPERPLASIA WITH URINARY FREQUENCY: ICD-10-CM

## 2024-04-12 DIAGNOSIS — I25.10 ATHEROSCLEROSIS OF NATIVE CORONARY ARTERY OF NATIVE HEART WITHOUT ANGINA PECTORIS: ICD-10-CM

## 2024-04-12 DIAGNOSIS — E11.21 TYPE 2 DIABETES MELLITUS WITH DIABETIC NEPHROPATHY, WITHOUT LONG-TERM CURRENT USE OF INSULIN (HCC): ICD-10-CM

## 2024-04-12 DIAGNOSIS — E78.2 MIXED HYPERLIPIDEMIA: ICD-10-CM

## 2024-04-12 DIAGNOSIS — R35.0 BENIGN PROSTATIC HYPERPLASIA WITH URINARY FREQUENCY: ICD-10-CM

## 2024-04-12 NOTE — TELEPHONE ENCOUNTER
Requested Prescriptions     Pending Prescriptions Disp Refills    lovastatin (MEVACOR) 40 MG tablet [Pharmacy Med Name: LOVASTATIN 40MG TAB] 180 tablet 0    metoprolol tartrate (LOPRESSOR) 25 MG tablet [Pharmacy Med Name: METOPROLOL TART 25MG TAB] 90 tablet 0     Sig: Take 1 tablet by mouth 2 times daily    tamsulosin (FLOMAX) 0.4 MG capsule [Pharmacy Med Name: TAMSULOSIN 0.4MG CAP] 90 capsule 0     Sig: Take 1 capsule by mouth daily    empagliflozin (JARDIANCE) 10 MG tablet 90 tablet 0     Sig: Take 1 tablet by mouth daily    glipiZIDE (GLUCOTROL) 5 MG tablet [Pharmacy Med Name: GLIPIZIDE  5MG TAB] 180 tablet 0     Sig: Take 1 tablet by mouth 2 times daily (before meals)     Last seen:  4/4/24

## 2024-04-13 RX ORDER — GLIPIZIDE 5 MG/1
5 TABLET ORAL
Qty: 180 TABLET | Refills: 0 | Status: SHIPPED | OUTPATIENT
Start: 2024-04-13

## 2024-04-13 RX ORDER — LOVASTATIN 40 MG/1
TABLET ORAL
Qty: 180 TABLET | Refills: 0 | Status: SHIPPED | OUTPATIENT
Start: 2024-04-13

## 2024-04-13 RX ORDER — TAMSULOSIN HYDROCHLORIDE 0.4 MG/1
0.4 CAPSULE ORAL DAILY
Qty: 90 CAPSULE | Refills: 0 | Status: SHIPPED | OUTPATIENT
Start: 2024-04-13

## 2024-04-15 ENCOUNTER — TELEPHONE (OUTPATIENT)
Dept: FAMILY MEDICINE CLINIC | Age: 79
End: 2024-04-15

## 2024-06-26 DIAGNOSIS — E78.2 MIXED HYPERLIPIDEMIA: ICD-10-CM

## 2024-06-26 RX ORDER — LOVASTATIN 40 MG/1
TABLET ORAL
Qty: 90 TABLET | Refills: 0 | Status: SHIPPED | OUTPATIENT
Start: 2024-06-26

## 2024-06-26 NOTE — TELEPHONE ENCOUNTER
Requested Prescriptions     Pending Prescriptions Disp Refills    lovastatin (MEVACOR) 40 MG tablet [Pharmacy Med Name: LOVASTATIN 40MG TAB] 180 tablet 0     Last seen:  4/4/24

## 2024-06-28 DIAGNOSIS — N40.1 BENIGN PROSTATIC HYPERPLASIA WITH URINARY FREQUENCY: ICD-10-CM

## 2024-06-28 DIAGNOSIS — I25.10 ATHEROSCLEROSIS OF NATIVE CORONARY ARTERY OF NATIVE HEART WITHOUT ANGINA PECTORIS: ICD-10-CM

## 2024-06-28 DIAGNOSIS — R35.0 BENIGN PROSTATIC HYPERPLASIA WITH URINARY FREQUENCY: ICD-10-CM

## 2024-06-28 DIAGNOSIS — E11.21 TYPE 2 DIABETES MELLITUS WITH DIABETIC NEPHROPATHY, WITHOUT LONG-TERM CURRENT USE OF INSULIN (HCC): ICD-10-CM

## 2024-06-28 RX ORDER — GLIPIZIDE 5 MG/1
5 TABLET ORAL DAILY
Qty: 90 TABLET | Refills: 0 | Status: SHIPPED | OUTPATIENT
Start: 2024-06-28

## 2024-06-28 RX ORDER — TAMSULOSIN HYDROCHLORIDE 0.4 MG/1
0.4 CAPSULE ORAL DAILY
Qty: 90 CAPSULE | Refills: 0 | Status: SHIPPED | OUTPATIENT
Start: 2024-06-28

## 2024-06-28 NOTE — TELEPHONE ENCOUNTER
Requested Prescriptions     Pending Prescriptions Disp Refills    empagliflozin (JARDIANCE) 10 MG tablet 90 tablet 0     Sig: Take 1 tablet by mouth daily    glipiZIDE (GLUCOTROL) 5 MG tablet [Pharmacy Med Name: GLIPIZIDE  5MG TAB] 90 tablet 0     Sig: Take 1 tablet by mouth daily    tamsulosin (FLOMAX) 0.4 MG capsule [Pharmacy Med Name: TAMSULOSIN 0.4MG CAP] 90 capsule 0     Sig: Take 1 capsule by mouth daily    metoprolol tartrate (LOPRESSOR) 25 MG tablet [Pharmacy Med Name: METOPROLOL TART  25MG TAB] 90 tablet 0     Sig: Take 1 tablet by mouth daily     Last seen:  4/4/24

## 2024-08-30 DIAGNOSIS — E11.21 TYPE 2 DIABETES MELLITUS WITH DIABETIC NEPHROPATHY, WITHOUT LONG-TERM CURRENT USE OF INSULIN (HCC): ICD-10-CM

## 2024-08-30 DIAGNOSIS — R35.0 BENIGN PROSTATIC HYPERPLASIA WITH URINARY FREQUENCY: ICD-10-CM

## 2024-08-30 DIAGNOSIS — E78.2 MIXED HYPERLIPIDEMIA: ICD-10-CM

## 2024-08-30 DIAGNOSIS — N40.1 BENIGN PROSTATIC HYPERPLASIA WITH URINARY FREQUENCY: ICD-10-CM

## 2024-08-30 NOTE — TELEPHONE ENCOUNTER
Requested Prescriptions     Pending Prescriptions Disp Refills    lovastatin (MEVACOR) 40 MG tablet [Pharmacy Med Name: LOVASTATIN 40 MG Tablet] 90 tablet 3     Sig: TAKE 1 TABLET EVERY NIGHT    glipiZIDE (GLUCOTROL) 5 MG tablet [Pharmacy Med Name: GLIPIZIDE 5 MG Tablet] 90 tablet 3     Sig: TAKE 1 TABLET EVERY DAY    tamsulosin (FLOMAX) 0.4 MG capsule [Pharmacy Med Name: TAMSULOSIN HYDROCHLORIDE 0.4 MG Capsule] 90 capsule 3     Sig: TAKE 1 CAPSULE EVERY DAY       Last seen:  4/4/24  Cont current meds and recheck in 6 mo  Encouraged to make apt with Dr Perez  No follow-up provider specified.  Danyell Toussaint MD    Last filled:  lovastatin (MEVACOR) 40 MG tablet [[id number]]    Order Details  Dose, Route, Frequency: As Directed   Dispense Quantity: 90 tablet Refills: 0          Sig: Take one at night         Start Date: 06/26/24       glipiZIDE (GLUCOTROL) 5 MG tablet [[id number]]    Order Details  Dose: 5 mg Route: Oral Frequency: DAILY   Dispense Quantity: 90 tablet Refills: 0          Sig: Take 1 tablet by mouth daily         Start Date: 06/28/24       tamsulosin (FLOMAX) 0.4 MG capsule [[id number]]    Order Details  Dose: 0.4 mg Route: Oral Frequency: DAILY   Dispense Quantity: 90 capsule Refills: 0          Sig: Take 1 capsule by mouth daily         Start Date: 06/28/24

## 2024-09-03 RX ORDER — TAMSULOSIN HYDROCHLORIDE 0.4 MG/1
0.4 CAPSULE ORAL DAILY
Qty: 90 CAPSULE | Refills: 0 | Status: SHIPPED | OUTPATIENT
Start: 2024-09-03

## 2024-09-03 RX ORDER — GLIPIZIDE 5 MG/1
5 TABLET ORAL DAILY
Qty: 90 TABLET | Refills: 0 | Status: SHIPPED | OUTPATIENT
Start: 2024-09-03

## 2024-09-03 RX ORDER — LOVASTATIN 40 MG
TABLET ORAL
Qty: 90 TABLET | Refills: 1 | Status: SHIPPED | OUTPATIENT
Start: 2024-09-03

## 2024-09-19 ENCOUNTER — OFFICE VISIT (OUTPATIENT)
Dept: FAMILY MEDICINE CLINIC | Age: 79
End: 2024-09-19

## 2024-09-19 VITALS
TEMPERATURE: 98 F | DIASTOLIC BLOOD PRESSURE: 60 MMHG | RESPIRATION RATE: 12 BRPM | BODY MASS INDEX: 27.09 KG/M2 | SYSTOLIC BLOOD PRESSURE: 170 MMHG | HEIGHT: 72 IN | WEIGHT: 200 LBS | HEART RATE: 63 BPM | OXYGEN SATURATION: 97 %

## 2024-09-19 DIAGNOSIS — E11.21 TYPE 2 DIABETES MELLITUS WITH DIABETIC NEPHROPATHY, WITHOUT LONG-TERM CURRENT USE OF INSULIN (HCC): ICD-10-CM

## 2024-09-19 DIAGNOSIS — I25.10 CORONARY ARTERY DISEASE INVOLVING NATIVE HEART WITHOUT ANGINA PECTORIS, UNSPECIFIED VESSEL OR LESION TYPE: ICD-10-CM

## 2024-09-19 DIAGNOSIS — I10 ESSENTIAL (PRIMARY) HYPERTENSION: ICD-10-CM

## 2024-09-19 DIAGNOSIS — Z09 HOSPITAL DISCHARGE FOLLOW-UP: Primary | ICD-10-CM

## 2024-09-19 RX ORDER — METOPROLOL SUCCINATE 25 MG/1
25 TABLET, EXTENDED RELEASE ORAL
COMMUNITY
Start: 2024-08-29

## 2024-09-19 RX ORDER — PANTOPRAZOLE SODIUM 40 MG/1
TABLET, DELAYED RELEASE ORAL
COMMUNITY
Start: 2024-08-06 | End: 2024-09-19

## 2024-09-19 RX ORDER — HYDRALAZINE HYDROCHLORIDE 25 MG/1
25 TABLET, FILM COATED ORAL 2 TIMES DAILY
COMMUNITY
Start: 2024-08-28 | End: 2025-08-28

## 2024-09-19 RX ORDER — AMLODIPINE BESYLATE 2.5 MG/1
2.5 TABLET ORAL DAILY
Qty: 1 TABLET | Refills: 0
Start: 2024-09-19

## 2024-09-19 ASSESSMENT — PATIENT HEALTH QUESTIONNAIRE - PHQ9
SUM OF ALL RESPONSES TO PHQ QUESTIONS 1-9: 0
SUM OF ALL RESPONSES TO PHQ QUESTIONS 1-9: 0
1. LITTLE INTEREST OR PLEASURE IN DOING THINGS: NOT AT ALL
SUM OF ALL RESPONSES TO PHQ9 QUESTIONS 1 & 2: 0
2. FEELING DOWN, DEPRESSED OR HOPELESS: NOT AT ALL
SUM OF ALL RESPONSES TO PHQ QUESTIONS 1-9: 0
SUM OF ALL RESPONSES TO PHQ QUESTIONS 1-9: 0

## 2024-09-19 ASSESSMENT — ENCOUNTER SYMPTOMS
COUGH: 0
SHORTNESS OF BREATH: 0

## 2024-11-14 DIAGNOSIS — R35.0 BENIGN PROSTATIC HYPERPLASIA WITH URINARY FREQUENCY: ICD-10-CM

## 2024-11-14 DIAGNOSIS — E11.21 TYPE 2 DIABETES MELLITUS WITH DIABETIC NEPHROPATHY, WITHOUT LONG-TERM CURRENT USE OF INSULIN (HCC): ICD-10-CM

## 2024-11-14 DIAGNOSIS — N40.1 BENIGN PROSTATIC HYPERPLASIA WITH URINARY FREQUENCY: ICD-10-CM

## 2024-11-14 RX ORDER — GLIPIZIDE 5 MG/1
2.5 TABLET ORAL DAILY
Qty: 45 TABLET | Refills: 0 | Status: SHIPPED | OUTPATIENT
Start: 2024-11-14

## 2024-11-14 RX ORDER — TAMSULOSIN HYDROCHLORIDE 0.4 MG/1
0.4 CAPSULE ORAL DAILY
Qty: 90 CAPSULE | Refills: 0 | Status: SHIPPED | OUTPATIENT
Start: 2024-11-14

## 2024-11-14 NOTE — TELEPHONE ENCOUNTER
Patient requesting refill on     Requested Prescriptions     Pending Prescriptions Disp Refills    tamsulosin (FLOMAX) 0.4 MG capsule [Pharmacy Med Name: Tamsulosin HCl Oral Capsule 0.4 MG] 90 capsule 3     Sig: TAKE 1 CAPSULE EVERY DAY    glipiZIDE (GLUCOTROL) 5 MG tablet [Pharmacy Med Name: glipiZIDE Oral Tablet 5 MG] 90 tablet 3     Sig: TAKE 1 TABLET EVERY DAY        Last OV 9/19/2024

## 2025-01-26 DIAGNOSIS — R35.0 BENIGN PROSTATIC HYPERPLASIA WITH URINARY FREQUENCY: ICD-10-CM

## 2025-01-26 DIAGNOSIS — E78.2 MIXED HYPERLIPIDEMIA: ICD-10-CM

## 2025-01-26 DIAGNOSIS — E11.21 TYPE 2 DIABETES MELLITUS WITH DIABETIC NEPHROPATHY, WITHOUT LONG-TERM CURRENT USE OF INSULIN (HCC): ICD-10-CM

## 2025-01-26 DIAGNOSIS — N40.1 BENIGN PROSTATIC HYPERPLASIA WITH URINARY FREQUENCY: ICD-10-CM

## 2025-01-27 RX ORDER — LOVASTATIN 40 MG/1
TABLET ORAL
Qty: 90 TABLET | Refills: 3 | OUTPATIENT
Start: 2025-01-27

## 2025-01-27 RX ORDER — TAMSULOSIN HYDROCHLORIDE 0.4 MG/1
0.4 CAPSULE ORAL DAILY
Qty: 90 CAPSULE | Refills: 3 | OUTPATIENT
Start: 2025-01-27

## 2025-01-27 RX ORDER — GLIPIZIDE 5 MG/1
2.5 TABLET ORAL DAILY
Qty: 45 TABLET | Refills: 3 | OUTPATIENT
Start: 2025-01-27

## 2025-01-27 NOTE — TELEPHONE ENCOUNTER
Patient requesting refill on     Requested Prescriptions     Pending Prescriptions Disp Refills    glipiZIDE (GLUCOTROL) 5 MG tablet [Pharmacy Med Name: glipiZIDE Oral Tablet 5 MG] 45 tablet 3     Sig: TAKE 1/2 TABLET EVERY DAY    lovastatin (MEVACOR) 40 MG tablet [Pharmacy Med Name: Lovastatin Oral Tablet 40 MG] 90 tablet 3     Sig: TAKE 1 TABLET EVERY NIGHT    tamsulosin (FLOMAX) 0.4 MG capsule [Pharmacy Med Name: Tamsulosin HCl Oral Capsule 0.4 MG] 90 capsule 3     Sig: TAKE 1 CAPSULE EVERY DAY        Last OV 9/19/2024
